# Patient Record
Sex: MALE | Race: WHITE
[De-identification: names, ages, dates, MRNs, and addresses within clinical notes are randomized per-mention and may not be internally consistent; named-entity substitution may affect disease eponyms.]

---

## 2012-09-15 VITALS — DIASTOLIC BLOOD PRESSURE: 67 MMHG | SYSTOLIC BLOOD PRESSURE: 129 MMHG

## 2018-07-29 ENCOUNTER — HOSPITAL ENCOUNTER (EMERGENCY)
Dept: HOSPITAL 97 - ER | Age: 38
Discharge: HOME | End: 2018-07-29
Payer: COMMERCIAL

## 2018-07-29 VITALS — TEMPERATURE: 98.5 F | DIASTOLIC BLOOD PRESSURE: 73 MMHG | SYSTOLIC BLOOD PRESSURE: 122 MMHG

## 2018-07-29 VITALS — OXYGEN SATURATION: 100 %

## 2018-07-29 DIAGNOSIS — Z88.8: ICD-10-CM

## 2018-07-29 DIAGNOSIS — E03.9: ICD-10-CM

## 2018-07-29 DIAGNOSIS — J45.909: Primary | ICD-10-CM

## 2018-07-29 LAB
BUN BLD-MCNC: 12 MG/DL (ref 7–18)
GLUCOSE SERPLBLD-MCNC: 78 MG/DL (ref 74–106)
HCT VFR BLD CALC: 42.9 % (ref 39.6–49)
LYMPHOCYTES # SPEC AUTO: 1.2 K/UL (ref 0.7–4.9)
MCH RBC QN AUTO: 29.3 PG (ref 27–35)
MCV RBC: 87.4 FL (ref 80–100)
PMV BLD: 8.6 FL (ref 7.6–11.3)
POTASSIUM SERPL-SCNC: 3.8 MMOL/L (ref 3.5–5.1)
RBC # BLD: 4.91 M/UL (ref 4.33–5.43)

## 2018-07-29 PROCEDURE — 71045 X-RAY EXAM CHEST 1 VIEW: CPT

## 2018-07-29 PROCEDURE — 36415 COLL VENOUS BLD VENIPUNCTURE: CPT

## 2018-07-29 PROCEDURE — 87040 BLOOD CULTURE FOR BACTERIA: CPT

## 2018-07-29 PROCEDURE — 85025 COMPLETE CBC W/AUTO DIFF WBC: CPT

## 2018-07-29 PROCEDURE — 99285 EMERGENCY DEPT VISIT HI MDM: CPT

## 2018-07-29 PROCEDURE — 80048 BASIC METABOLIC PNL TOTAL CA: CPT

## 2018-07-29 PROCEDURE — 96374 THER/PROPH/DIAG INJ IV PUSH: CPT

## 2018-07-29 PROCEDURE — 81003 URINALYSIS AUTO W/O SCOPE: CPT

## 2018-07-29 NOTE — ER
Nurse's Notes                                                                                     

 [facility]                                                                

Name: Ralph Yañez                                                                             

Age: 37 yrs                                                                                       

Sex: Male                                                                                         

: 1980                                                                                   

MRN: [id number]                                                                                   

Arrival Date: 2018                                                                          

Time: 09:26                                                                                       

Account#: [id number]                                                                            

Bed 7                                                                                             

Private MD:                                                                                       

Diagnosis: Asthma                                                                                 

                                                                                                  

Presentation:                                                                                     

                                                                                             

09:35 Presenting complaint: Patient states: fatigue and constipation since yesterday. Pt      ss  

      reports last BM was yesterday and was, "soupy". Transition of care: patient was not         

      received from another setting of care. Onset of symptoms was 2018. Risk            

      Assessment: Do you want to hurt yourself or someone else? Patient reports no desire to      

      harm self or others. Initial Sepsis Screen: Does the patient meet any 2 criteria? No.       

      Patient's initial sepsis screen is negative. Does the patient have a suspected source       

      of infection? No. Patient's initial sepsis screen is negative. Care prior to arrival:       

      None.                                                                                       

09:35 Method Of Arrival: Ambulatory                                                           ss  

09:35 Acuity: JACEY 3                                                                           ss  

                                                                                                  

Stroke Activation: Symptom onset > 6 hours                                                        

 Physician: Stroke Attending; Name: ; Notified At: ; Arrived At:                                  

 Physician: Chief Stroke Resident; Name: ; Notified At: ; Arrived At:                             

 Physician: Stroke Resident; Name: ; Notified At: ; Arrived At:                                   

 Physician: ED Attending; Name: ; Notified At: ; Arrived At:                                      

 Physician: ED Resident; Name: ; Notified At: ; Arrived At:                                       

                                                                                                  

Historical:                                                                                       

- Allergies:                                                                                      

09:36 Dilantin;                                                                               ss  

- PMHx:                                                                                           

09:36 Asthma; hyperthyroidism; seasonal allergies; Pneumonia;                                 ss  

                                                                                                  

- Immunization history:: Adult Immunizations up to date.                                          

- Social history:: Smoking status: Patient/guardian denies using tobacco.                         

- Ebola Screening: : Patient denies exposure to infectious person Patient denies travel           

  to an Ebola-affected area in the 21 days before illness onset.                                  

                                                                                                  

                                                                                                  

Screenin:50 Abuse screen: Denies threats or abuse. Denies injuries from another. Nutritional        jl7 

      screening: No deficits noted. Tuberculosis screening: No symptoms or risk factors           

      identified. Fall Risk IV access (20 points). Total Martinez Fall Scale indicates No Risk       

      (0-24 pts).                                                                                 

                                                                                                  

Assessment:                                                                                       

09:50 General: Appears in no apparent distress. uncomfortable, Behavior is calm, cooperative, jl7 

      appropriate for age. Pain: Denies pain. Neuro: Level of Consciousness is awake, alert,      

      obeys commands, Oriented to person, place, time. Cardiovascular: Heart tones present        

      Patient's skin is warm and dry. Respiratory: Airway is patent Respiratory effort is         

      even, unlabored, Respiratory pattern is regular, symmetrical, Breath sounds with            

      rhonchi bilaterally. Breath sounds with wheezes bilaterally. GI: No signs and/or            

      symptoms were reported involving the gastrointestinal system. : No signs and/or           

      symptoms were reported regarding the genitourinary system. EENT: No signs and/or            

      symptoms were reported regarding the EENT system. Derm: Skin is pink, warm \T\ dry.         

      Musculoskeletal: No signs and/or symptoms reported regarding the musculoskeletal system.    

10:50 Reassessment: No changes from previously documented assessment. Patient and/or family   jl7 

      updated on plan of care and expected duration. Pain level reassessed. Patient is alert,     

      oriented x 3, equal unlabored respirations, skin warm/dry/pink.                             

11:47 Reassessment: Patient and/or family updated on plan of care and expected duration. Pain jl7 

      level reassessed. Patient is alert, oriented x 3, equal unlabored respirations, skin        

      warm/dry/pink.                                                                              

                                                                                                  

Vital Signs:                                                                                      

09:36  / 89; Pulse 68; Resp 16; Temp 98.7(TE); Pulse Ox 95% on R/A; Weight 83.01 kg;    ss  

      Height 5 ft. 9 in. (175.26 cm); Pain 0/10;                                                  

10:06  / 85; Pulse 64; Resp 16; Temp 99.2(O); Pulse Ox 98% on R/A;                      jl7 

11:00  / 72; Pulse 65; Resp 16 S; Pulse Ox 100% on R/A;                                 jl7 

11:47  / 73; Pulse 62; Resp 16; Temp 98.5(O); Pulse Ox 100% on R/A;                     jl7 

09:36 Body Mass Index 27.02 (83.01 kg, 175.26 cm)                                               

                                                                                                  

ED Course:                                                                                        

09:26 Patient arrived in ED.                                                                  as  

09:36 Triage completed.                                                                       ss  

09:37 Arm band placed on right wrist.                                                         ss  

09:43 Rony Strong MD is Attending Physician.                                              kdr 

09:45 Shelley Dumont RN is Primary Nurse.                                                    ph  

09:50 Patient has correct armband on for positive identification. Placed in gown. Bed in low  jl7 

      position. Call light in reach. Side rails up X 1. Cardiac monitor on. Pulse ox on. NIBP     

      on. Warm blanket given.                                                                     

09:55 Missed attempt(s): 20 gauge in right forearm. Bleeding controlled, band aid applied,    jl7 

      catheter tip intact.                                                                        

10:00 Inserted saline lock: 20 gauge in left forearm, using aseptic technique. Blood          jl7 

      collected.                                                                                  

10:00 Initial lab(s) drawn, by me, sent to lab.                                               jl7 

10:06 Del Wan, RN is Primary Nurse.                                                      jl7 

10:13 CXR XRAY In Process Unspecified.                                                        EDMS

12:34 No provider procedures requiring assistance completed. IV discontinued, intact,         ss  

      bleeding controlled, No redness/swelling at site. Pressure dressing applied.                

                                                                                                  

Administered Medications:                                                                         

10:15 Drug: Xopenex (3) 1.25 mg Route: Inhalation;                                            jl7 

10:45 Follow up: Response: No adverse reaction                                                jl7 

10:15 Drug: SOLU-Medrol 125 mg Route: IVP; Site: left forearm;                                jl7 

10:45 Follow up: Response: No adverse reaction; Marked relief of symptoms                     jl7 

                                                                                                  

                                                                                                  

Outcome:                                                                                          

12:06 Discharge ordered by MD.                                                                kdr 

12:34 Discharged to home ambulatory.                                                          ss  

12:34 Condition: improved                                                                         

12:34 Discharge instructions given to patient, Instructed on discharge instructions, follow       

      up and referral plans. medication usage, Demonstrated understanding of instructions,        

      follow-up care, medications.                                                                

12:35 Patient left the ED.                                                                    ss  

                                                                                                  

Signatures:                                                                                       

Dispatcher [employer]                           EDMS                                                 

Rony Strong MD MD kdr Martinez, Amelia as Smirch, Shelby, TWILA                      RN                                                      

Shelley Dumont RN                      RN                                                      

Del Wan, TWILA                        RN   jl7                                                  

                                                                                                  

**************************************************************************************************

## 2018-07-29 NOTE — EDPHYS
Physician Documentation                                                                           

 [facility]                                                                

Name: Ralph Yañez                                                                             

Age: 37 yrs                                                                                       

Sex: Male                                                                                         

: 1980                                                                                   

MRN: [id number]                                                                                   

Arrival Date: 2018                                                                          

Time: 09:26                                                                                       

Account#: [id number]                                                                            

Bed 7                                                                                             

Private MD:                                                                                       

ED Physician Rony Strong                                                                       

HPI:                                                                                              

                                                                                             

10:29 This 37 yrs old  Male presents to ER via Ambulatory with complaints of         kdr 

      Weakness, Doesn't Feel Right.                                                               

10:29 The patient has not felt well since yesterday and this morning when he awoke, he flet   kdr 

      generally weak. He had loose stool yesterday but today feels constipated. He has not        

      been using his nebulizer on a regular basis - the last time was a few days ago. Denies      

      any other focal c/o in the ED at this time. No apparent acute illness or injury. VS are     

      noted be normal.. Onset: The symptoms/episode began/occurred gradually, yesterday.          

      Severity of symptoms: At their worst the symptoms were mild in the emergency department     

      the symptoms are unchanged. The patient has experienced similar episodes in the past,       

      multiple times. The patient has not recently seen a physician.                              

                                                                                                  

Historical:                                                                                       

- Allergies:                                                                                      

09:36 Dilantin;                                                                               ss  

- PMHx:                                                                                           

09:36 Asthma; hyperthyroidism; seasonal allergies; Pneumonia;                                 ss  

                                                                                                  

- Immunization history:: Adult Immunizations up to date.                                          

- Social history:: Smoking status: Patient/guardian denies using tobacco.                         

- Ebola Screening: : Patient denies exposure to infectious person Patient denies travel           

  to an Ebola-affected area in the 21 days before illness onset.                                  

                                                                                                  

                                                                                                  

ROS:                                                                                              

10:29 Constitutional: Negative for fever, chills, and weight loss, Eyes: Negative for injury, kdr 

      pain, redness, and discharge, ENT: Negative for injury, pain, and discharge, Neck:          

      Negative for injury, pain, and swelling, Cardiovascular: Negative for chest pain,           

      palpitations, and edema, Abdomen/GI: Negative for abdominal pain, nausea, vomiting,         

      diarrhea, and constipation, Back: Negative for injury and pain, : Negative for            

      injury, bleeding, discharge, and swelling, MS/Extremity: Negative for injury and            

      deformity, Skin: Negative for injury, rash, and discoloration, Neuro: Negative for          

      headache, weakness, numbness, tingling, and seizure activity.                               

10:29 Respiratory: Positive for shortness of breath, wheezing, inspiratory, expiratory.           

                                                                                                  

Exam:                                                                                             

10:29 Constitutional:  This is a well developed, well nourished patient who is awake, alert,  kdr 

      and in no acute distress. Head/Face:  Normocephalic, atraumatic. Eyes:  Pupils equal        

      round and reactive to light, extra-ocular motions intact.  Lids and lashes normal.          

      Conjunctiva and sclera are non-icteric and not injected.  Cornea within normal limits.      

      Periorbital areas with no swelling, redness, or edema. Neck:  Trachea midline, no           

      thyromegaly or masses palpated, and no cervical lymphadenopathy.  Supple, full range of     

      motion without nuchal rigidity, or vertebral point tenderness.  No Meningismus.             

      Chest/axilla:  Normal chest wall appearance and motion.  Nontender with no deformity.       

      No lesions are appreciated. Cardiovascular:  Regular rate and rhythm with a normal S1       

      and S2.  No gallops, murmurs, or rubs.  Normal PMI, no JVD.  No pulse deficits.             

      Abdomen/GI:  Soft, non-tender, with normal bowel sounds.  No distension or tympany.  No     

      guarding or rebound.  No evidence of tenderness throughout. Back:  No spinal                

      tenderness.  No costovertebral tenderness.  Full range of motion. Skin:  Warm, dry with     

      normal turgor.  Normal color with no rashes, no lesions, and no evidence of cellulitis.     

      MS/ Extremity:  Pulses equal, no cyanosis.  Neurovascular intact.  Full, normal range       

      of motion. Neuro:  Awake and alert, GCS 15, oriented to person, place, time, and            

      situation.  Cranial nerves II-XII grossly intact.  Motor strength 5/5 in all                

      extremities.  Sensory grossly intact.  Cerebellar exam normal.  Normal gait. Psych:         

      Awake, alert, with orientation to person, place and time.  Behavior, mood, and affect       

      are within normal limits.                                                                   

10:29 Respiratory: the patient does not display signs of respiratory distress,  Respirations:     

      normal, Breath sounds: rales, bronchial sounds, wheezing: that is mild, that is             

      moderate, is heard diffusely, is heard in the  left posterior upper lobe, right             

      posterior upper lobe, left posterior lower lobe, right posterior middle lobe and right      

      posterior lower lobe.                                                                       

                                                                                                  

Vital Signs:                                                                                      

09:36  / 89; Pulse 68; Resp 16; Temp 98.7(TE); Pulse Ox 95% on R/A; Weight 83.01 kg;    ss  

      Height 5 ft. 9 in. (175.26 cm); Pain 0/10;                                                  

10:06  / 85; Pulse 64; Resp 16; Temp 99.2(O); Pulse Ox 98% on R/A;                      jl7 

11:00  / 72; Pulse 65; Resp 16 S; Pulse Ox 100% on R/A;                                 jl7 

11:47  / 73; Pulse 62; Resp 16; Temp 98.5(O); Pulse Ox 100% on R/A;                     jl7 

09:36 Body Mass Index 27.02 (83.01 kg, 175.26 cm)                                             ss  

                                                                                                  

MDM:                                                                                              

12:06 Patient medically screened.                                                             kdr 

13:50 Data reviewed: vital signs, nurses notes.                                               kdr 

                                                                                                  

                                                                                             

09:49 Order name: CBC with Diff; Complete Time: 11:07                                         kdr 

                                                                                             

09:49 Order name: Chem 7; Complete Time: 11:07                                                kdr 

                                                                                             

09:49 Order name: Blood Culture Adult (2)                                                     kdr 

                                                                                             

09:49 Order name: CXR XRAY; Complete Time: 12:05                                              kdr 

                                                                                             

11:06 Order name: Urine Dipstick--Ancillary (enter results)                                   eb  

                                                                                             

11:07 Order name: Urine Dipstick-Ancillary; Complete Time: 11:30                              EDMS

                                                                                             

09:49 Order name: Urine Dipstick-Ancillary (obtain specimen); Complete Time: 12:00            kdr 

                                                                                                  

Administered Medications:                                                                         

10:15 Drug: Xopenex (3) 1.25 mg Route: Inhalation;                                            7 

10:45 Follow up: Response: No adverse reaction                                                jl7 

10:15 Drug: SOLU-Medrol 125 mg Route: IVP; Site: left forearm;                                jl7 

10:45 Follow up: Response: No adverse reaction; Marked relief of symptoms                     jl7 

                                                                                                  

                                                                                                  

Disposition:                                                                                      

18 12:06 Discharged to Home. Impression: Asthma.                                            

- Condition is Stable.                                                                            

- Discharge Instructions: Asthma, Adult, Easy-to-Read, Asthma Attack Prevention, Adult.           

- Prescriptions for Xopenex 1.25 mg/3 mL Inhalation Solution for Nebulization - inhale            

  1 unit by NEBULIZATION route every 8 hours As needed; 1 box. Medrol (Tu) 4 mg Oral             

  Tablets, Dose Pack - take 1 tablet by ORAL route as directed - follow package                   

  instructions; 1 packet. Cipro 500 mg Oral Tablet - take 1 tablet by ORAL route every            

  12 hours for 10 days; 20 tablet.                                                                

- Medication Reconciliation Form, Thank You Letter, Antibiotic Education form.                    

- Follow up: Private Physician; When: 2 - 3 days; Reason: If symptoms return, Further             

  diagnostic work-up, Recheck today's complaints, Continuance of care, Re-evaluation by           

  your physician.                                                                                 

- Problem is new.                                                                                 

- Symptoms have improved.                                                                         

                                                                                                  

                                                                                                  

                                                                                                  

Signatures:                                                                                       

Dispatcher [employer]                           EDMS                                                 

Rony Strong MD MD   kdr                                                  

Yessy Denton RN                      RN   ss                                                   

Del Wan RN                        RN   jl7                                                  

                                                                                                  

Corrections: (The following items were deleted from the chart)                                    

12:35 12:06 2018 12:06 Discharged to Home. Impression: Asthma. Condition is Stable.     ss  

      Forms are Medication Reconciliation Form, Thank You Letter, Antibiotic Education,           

      Prescription Opioid Use. Follow up: Private Physician; When: 2 - 3 days; Reason: If         

      symptoms return, Further diagnostic work-up, Recheck today's complaints, Continuance of     

      care, Re-evaluation by your physician. Problem is new. Symptoms have improved. kdr          

                                                                                                  

**************************************************************************************************

## 2018-07-29 NOTE — RAD REPORT
EXAM DESCRIPTION:  RAD - Chest Single View - 7/29/2018 10:13 am

 

CLINICAL HISTORY:  Cough

 

COMPARISON:  July 9

 

TECHNIQUE:  AP portable chest image was obtained 1010 hours .

 

FINDINGS:  No mass, consolidation or acute failure. Interstitial markings are prominent but improved 
from the comparison. Hilar regions and vasculature is stable. Heart and vasculature are normal. No me
asurable pleural effusion and no pneumothorax. No gross bony abnormality seen. No acute aortic findin
gs suspected.

 

IMPRESSION:  No acute cardiopulmonary process.

 

Lung markings have decreased in prominence since July 9.

## 2018-09-04 ENCOUNTER — HOSPITAL ENCOUNTER (EMERGENCY)
Dept: HOSPITAL 97 - ER | Age: 38
Discharge: HOME | End: 2018-09-04
Payer: COMMERCIAL

## 2018-09-04 VITALS — TEMPERATURE: 99 F

## 2018-09-04 VITALS — OXYGEN SATURATION: 100 % | DIASTOLIC BLOOD PRESSURE: 57 MMHG | SYSTOLIC BLOOD PRESSURE: 116 MMHG

## 2018-09-04 DIAGNOSIS — J45.909: ICD-10-CM

## 2018-09-04 DIAGNOSIS — Z88.8: ICD-10-CM

## 2018-09-04 DIAGNOSIS — J32.9: Primary | ICD-10-CM

## 2018-09-04 DIAGNOSIS — J30.2: ICD-10-CM

## 2018-09-04 LAB
BUN BLD-MCNC: 20 MG/DL (ref 7–18)
GLUCOSE SERPLBLD-MCNC: 94 MG/DL (ref 74–106)
HCT VFR BLD CALC: 45 % (ref 39.6–49)
LYMPHOCYTES # SPEC AUTO: 1.7 K/UL (ref 0.7–4.9)
MCH RBC QN AUTO: 29.5 PG (ref 27–35)
MCV RBC: 86.1 FL (ref 80–100)
PMV BLD: 8.3 FL (ref 7.6–11.3)
POTASSIUM SERPL-SCNC: 3.8 MMOL/L (ref 3.5–5.1)
RBC # BLD: 5.23 M/UL (ref 4.33–5.43)

## 2018-09-04 PROCEDURE — 99284 EMERGENCY DEPT VISIT MOD MDM: CPT

## 2018-09-04 PROCEDURE — 96372 THER/PROPH/DIAG INJ SC/IM: CPT

## 2018-09-04 PROCEDURE — 85025 COMPLETE CBC W/AUTO DIFF WBC: CPT

## 2018-09-04 PROCEDURE — 94640 AIRWAY INHALATION TREATMENT: CPT

## 2018-09-04 PROCEDURE — 87081 CULTURE SCREEN ONLY: CPT

## 2018-09-04 PROCEDURE — 36415 COLL VENOUS BLD VENIPUNCTURE: CPT

## 2018-09-04 PROCEDURE — 71045 X-RAY EXAM CHEST 1 VIEW: CPT

## 2018-09-04 PROCEDURE — 80048 BASIC METABOLIC PNL TOTAL CA: CPT

## 2018-09-04 PROCEDURE — 87070 CULTURE OTHR SPECIMN AEROBIC: CPT

## 2018-09-04 NOTE — RAD REPORT
EXAM DESCRIPTION:  RAD - Chest Single View - 9/4/2018 3:43 am

 

CLINICAL HISTORY:  COUGH

Chest pain.

 

COMPARISON:  Chest Single View dated 7/29/2018; Chest Pa And Lat (2 Views) dated 7/9/2018; Chest Pa A
nd Lat (2 Views) dated 4/27/2018; Chest Pa And Lat (2 Views) dated 1/8/2018

 

FINDINGS:  Portable technique limits examination quality.

 

The lungs are grossly clear. The heart is normal in size. No displaced fractures.

 

IMPRESSION:  No acute intrathoracic process suspected.

## 2018-09-04 NOTE — ER
Nurse's Notes                                                                                     

 [facility]                                                                

Name: Ralph Yañez                                                                             

Age: 37 yrs                                                                                       

Sex: Male                                                                                         

: 1980                                                                                   

MRN: [id number]                                                                                   

Arrival Date: 2018                                                                          

Time: 03:03                                                                                       

Account#: [id number]                                                                            

Bed 15                                                                                            

Private MD:                                                                                       

Diagnosis: Sinusitis                                                                              

                                                                                                  

Presentation:                                                                                     

                                                                                             

03:14 Presenting complaint: Patient states: he has been "clogged up" since Friday indicating  bb  

      his face and head, and it seems to be working its way down also has irritated throat        

      and non-productive cough. Transition of care: patient was not received from another         

      setting of care. Onset of symptoms was 2018. Risk Assessment: Do you want to     

      hurt yourself or someone else? Patient reports no desire to harm self or others.            

      Initial Sepsis Screen: Does the patient meet any 2 criteria? No. Patient's initial          

      sepsis screen is negative. Does the patient have a suspected source of infection? No.       

      Patient's initial sepsis screen is negative. Care prior to arrival: None.                   

03:14 Method Of Arrival: Ambulatory                                                           bb  

03:14 Acuity: JACEY 4                                                                           bb  

                                                                                                  

Historical:                                                                                       

- Allergies:                                                                                      

03:16 Dilantin;                                                                               bb  

- Home Meds:                                                                                      

03:16 levothyroxine oral [Active];                                                            bb  

03:29 albuterol sulfate 0.63 mg/3 mL Inhl nebu [Active]; Flonase 50 mcg/actuation Nasal spsn  ao  

      1 spray 2 times per day [Active]; Zyrtec 10 mg Oral chew 1 tab once daily [Active];         

- PMHx:                                                                                           

03:16 Asthma; hyperthyroidism; Pneumonia; seasonal allergies;                                 bb  

- PSHx:                                                                                           

03:16 Thyroidectomy;                                                                          bb  

                                                                                                  

- Immunization history:: Adult Immunizations up to date.                                          

- Social history:: Smoking status: Patient/guardian denies using tobacco,                         

  Patient/guardian denies using alcohol, street drugs.                                            

- Ebola Screening: : No symptoms or risks identified at this time.                                

                                                                                                  

                                                                                                  

Screenin:26 Abuse screen: Denies threats or abuse. Denies injuries from another. Nutritional        ao  

      screening: No deficits noted. Tuberculosis screening: No symptoms or risk factors           

      identified. Fall Risk None identified.                                                      

                                                                                                  

Assessment:                                                                                       

03:20 General: Appears in no apparent distress. comfortable, Behavior is cooperative,         ao  

      inappropriate for age. Pain: Complains of pain in irritated chest. Neuro: Level of          

      Consciousness is awake, alert, obeys commands, Oriented to person, place, time,             

      situation, Appropriate for age Moves all extremities. Full function Speech is normal,       

      Facial symmetry appears normal. Cardiovascular: Capillary refill < 3 seconds Patient's      

      skin is warm and dry. Respiratory: Airway is patent Respiratory effort is even,             

      unlabored, Respiratory pattern is regular, symmetrical, Breath sounds with wheezes          

      bilaterally. GI: Abdomen is non-distended. : No signs and/or symptoms were reported       

      regarding the genitourinary system. EENT: No signs and/or symptoms were reported            

      regarding the EENT system. Derm: Skin is intact, Skin temperature is warm.                  

      Musculoskeletal: Circulation, motion, and sensation intact. Range of motion: intact in      

      all extremities.                                                                            

04:13 Reassessment: Patient appears in no apparent distress at this time. Patient and/or      ao  

      family updated on plan of care and expected duration. Pain level reassessed. Patient is     

      alert, oriented x 3, equal unlabored respirations, skin warm/dry/pink.                      

                                                                                                  

Vital Signs:                                                                                      

03:16  / 91; Pulse 77; Resp 16 S; Temp 99(O); Pulse Ox 98% on R/A; Weight 80.29 kg (R); bb  

      Height 5 ft. 9 in. (175.26 cm) (R); Pain 0/10;                                              

04:13  / 57; Pulse 79; Resp 16; Pulse Ox 100% on R/A;                                   ao  

03:16 Body Mass Index 26.14 (80.29 kg, 175.26 cm)                                             bb  

                                                                                                  

ED Course:                                                                                        

03:03 Patient arrived in ED.                                                                  es  

03:06 Garrett Coffey RN is Primary Nurse.                                                       ao  

03:10 David Sánchez MD is Attending Physician.                                                    pkl 

03:15 Triage completed.                                                                       bb  

03:16 Arm band placed on Patient placed in an exam room, on a stretcher, on pulse oximetry.   bb  

03:27 Patient has correct armband on for positive identification. Pulse ox on. NIBP on.       ao  

03:43 X-ray completed. Portable x-ray completed in exam room. Patient tolerated procedure     kw  

      well.                                                                                       

03:44 XRAY CXR (1 view) In Process Unspecified.                                               EDMS

05:36 No provider procedures requiring assistance completed. Patient did not have IV access   ao  

      during this emergency room visit.                                                           

                                                                                                  

Administered Medications:                                                                         

03:45 Drug: SOLU-Medrol 125 mg Route: IM; Site: left deltoid;                                 ao  

05:00 Follow up: Response: No adverse reaction                                                ao  

03:46 Drug: Albuterol - atroVENT (3:1) (2.5 mg - 0.5 mg) 3 ml Route: Nebulizer;               ao  

05:00 Follow up: Response: No adverse reaction                                                ao  

                                                                                                  

                                                                                                  

Outcome:                                                                                          

05:09 Discharge ordered by MD.                                                                [place] 

05:36 Discharged to home ambulatory.                                                          ao  

05:36 Condition: stable                                                                           

05:36 Discharge instructions given to patient, Instructed on discharge instructions, follow       

      up and referral plans. Demonstrated understanding of instructions, follow-up care,          

      medications, Prescriptions given X 2.                                                       

05:37 Patient left the ED.                                                                    ao  

                                                                                                  

Signatures:                                                                                       

Dispatcher [employer]                           David Gupta MD MD pkl Salyer, Edna es Ballard, Brenda, RN                     RN   Mallory Jackson Alex, RN                         RN   ao                                                   

                                                                                                  

**************************************************************************************************

## 2018-09-04 NOTE — EDPHYS
Physician Documentation                                                                           

 [facility]                                                                

Name: Ralph Yañez                                                                             

Age: 37 yrs                                                                                       

Sex: Male                                                                                         

: 1980                                                                                   

MRN: [id number]                                                                                   

Arrival Date: 2018                                                                          

Time: 03:03                                                                                       

Account#: [id number]                                                                            

Bed 15                                                                                            

Private MD:                                                                                       

ED Physician David Sánchez                                                                             

HPI:                                                                                              

                                                                                             

03:28 This 37 yrs old  Male presents to ER via Ambulatory with complaints of         pkl 

      Congestion.                                                                                 

03:28 The patient or guardian reports cough, with no sputum, sinus congestion. Onset: The     pkl 

      symptoms/episode began/occurred 3 day(s) ago.                                               

                                                                                                  

Historical:                                                                                       

- Allergies:                                                                                      

03:16 Dilantin;                                                                               bb  

- Home Meds:                                                                                      

03:16 levothyroxine oral [Active];                                                            bb  

03:29 albuterol sulfate 0.63 mg/3 mL Inhl nebu [Active]; Flonase 50 mcg/actuation Nasal spsn  ao  

      1 spray 2 times per day [Active]; Zyrtec 10 mg Oral chew 1 tab once daily [Active];         

- PMHx:                                                                                           

03:16 Asthma; hyperthyroidism; Pneumonia; seasonal allergies;                                 bb  

- PSHx:                                                                                           

03:16 Thyroidectomy;                                                                          bb  

                                                                                                  

- Immunization history:: Adult Immunizations up to date.                                          

- Social history:: Smoking status: Patient/guardian denies using tobacco,                         

  Patient/guardian denies using alcohol, street drugs.                                            

- Ebola Screening: : No symptoms or risks identified at this time.                                

                                                                                                  

                                                                                                  

ROS:                                                                                              

03:28 Eyes: Negative for injury, pain, redness, and discharge, ENT: Negative for injury,      pkl 

      pain, and discharge, Neck: Negative for injury, pain, and swelling, Cardiovascular:         

      Negative for chest pain, palpitations, and edema.                                           

03:28 Respiratory: Positive for cough, with no reported sputum.                                   

03:28 Abdomen/GI: Negative for abdominal pain, nausea, vomiting, and diarrhea.                    

03:28 Back: Negative for acute changes.                                                           

03:28 : Negative for urinary symptoms.                                                          

03:28 MS/extremity: Negative for acute changes.                                                   

03:28 Skin: Negative for rash.                                                                    

03:28 Neuro: Negative for altered mental status.                                                  

                                                                                                  

Exam:                                                                                             

03:28 Head/Face:  Normocephalic, atraumatic. Eyes:  Pupils equal round and reactive to light, pkl 

      extra-ocular motions intact.  Lids and lashes normal.  Conjunctiva and sclera are           

      non-icteric and not injected.  Cornea within normal limits.  Periorbital areas with no      

      swelling, redness, or edema. ENT:  Nares patent. No nasal discharge, no septal              

      abnormalities noted.  Tympanic membranes are normal and external auditory canals are        

      clear.  Oropharynx with no redness, swelling, or masses, exudates, or evidence of           

      obstruction, uvula midline.  Mucous membranes moist. Neck:  Trachea midline, no             

      thyromegaly or masses palpated, and no cervical lymphadenopathy.  Supple, full range of     

      motion without nuchal rigidity, or vertebral point tenderness.  No Meningismus.             

      Chest/axilla:  Normal chest wall appearance and motion.  Nontender with no deformity.       

      No lesions are appreciated. Cardiovascular:  Regular rate and rhythm with a normal S1       

      and S2.  No gallops, murmurs, or rubs.  Normal PMI, no JVD.  No pulse deficits.             

      Respiratory:  Lungs have equal breath sounds bilaterally, clear to auscultation and         

      percussion.  No rales, rhonchi or wheezes noted.  No increased work of breathing, no        

      retractions or nasal flaring. Abdomen/GI:  Soft, non-tender, with normal bowel sounds.      

      No distension or tympany.  No guarding or rebound.  No evidence of tenderness               

      throughout. Back:  No spinal tenderness.  No costovertebral tenderness.  Full range of      

      motion. Skin:  Warm, dry with normal turgor.  Normal color with no rashes, no lesions,      

      and no evidence of cellulitis. MS/ Extremity:  Pulses equal, no cyanosis.                   

      Neurovascular intact.  Full, normal range of motion. Neuro:  Awake and alert, GCS 15,       

      oriented to person, place, time, and situation.  Cranial nerves II-XII grossly intact.      

      Motor strength 5/5 in all extremities.  Sensory grossly intact.  Cerebellar exam            

      normal.  Normal gait.                                                                       

                                                                                                  

Vital Signs:                                                                                      

03:16  / 91; Pulse 77; Resp 16 S; Temp 99(O); Pulse Ox 98% on R/A; Weight 80.29 kg (R); bb  

      Height 5 ft. 9 in. (175.26 cm) (R); Pain 0/10;                                              

04:13  / 57; Pulse 79; Resp 16; Pulse Ox 100% on R/A;                                   ao  

03:16 Body Mass Index 26.14 (80.29 kg, 175.26 cm)                                             bb  

                                                                                                  

MDM:                                                                                              

03:10 Patient medically screened.                                                             pkl 

05:07 Data reviewed: vital signs, nurses notes, lab test result(s), radiologic studies, plain pkl 

      films.                                                                                      

                                                                                                  

                                                                                             

03:26 Order name: CBC with Diff; Complete Time: 03:57                                         pkl 

                                                                                             

03:26 Order name: Chem 7; Complete Time: 05:04                                                pkl 

                                                                                             

03:26 Order name: Strep; Complete Time: 05:04                                                 pkl 

                                                                                             

03:26 Order name: XRAY CXR (1 view)                                                           pkl 

                                                                                             

04:03 Order name: Throat Culture                                                              EDMS

                                                                                                  

Administered Medications:                                                                         

03:45 Drug: SOLU-Medrol 125 mg Route: IM; Site: left deltoid;                                 ao  

05:00 Follow up: Response: No adverse reaction                                                ao  

03:46 Drug: Albuterol - atroVENT (3:1) (2.5 mg - 0.5 mg) 3 ml Route: Nebulizer;               ao  

05:00 Follow up: Response: No adverse reaction                                                ao  

                                                                                                  

                                                                                                  

Disposition:                                                                                      

18 05:09 Discharged to Home. Impression: Sinusitis.                                         

- Condition is Stable.                                                                            

                                                                                                  

- Prescriptions for Augmentin 875- 125 mg Oral Tablet - take 1 tablet by ORAL route               

  every 12 hours for 10 days; 20 tablet.                                                          

- Medication Reconciliation Form, Thank You Letter, Antibiotic Education, Prescription            

  Opioid Use form.                                                                                

- Follow up: Private Physician; When: 2 - 3 days; Reason: Re-evaluation by your                   

  physician.                                                                                      

- Problem is new.                                                                                 

- Symptoms have improved.                                                                         

                                                                                                  

                                                                                                  

                                                                                                  

Signatures:                                                                                       

Dispatcher [employer]                           EDMS                                                 

David Sánchez MD MD pkl Ballard, Brenda, RN                     RN   Garrett Rodriguez RN                         RN   ao                                                   

                                                                                                  

Corrections: (The following items were deleted from the chart)                                    

05:37 05:09 2018 05:09 Discharged to Home. Impression: Sinusitis. Condition is Stable.  ao  

      Forms are Medication Reconciliation Form, Thank You Letter, Antibiotic Education,           

      Prescription Opioid Use. Follow up: Private Physician; When: 2 - 3 days; Reason:            

      Re-evaluation by your physician. Problem is new. Symptoms have improved. pkl                

                                                                                                  

**************************************************************************************************

## 2018-11-29 ENCOUNTER — HOSPITAL ENCOUNTER (EMERGENCY)
Dept: HOSPITAL 97 - ER | Age: 38
Discharge: HOME | End: 2018-11-29
Payer: COMMERCIAL

## 2018-11-29 VITALS — SYSTOLIC BLOOD PRESSURE: 136 MMHG | OXYGEN SATURATION: 98 % | DIASTOLIC BLOOD PRESSURE: 77 MMHG

## 2018-11-29 VITALS — TEMPERATURE: 98 F

## 2018-11-29 DIAGNOSIS — J45.901: Primary | ICD-10-CM

## 2018-11-29 DIAGNOSIS — J30.2: ICD-10-CM

## 2018-11-29 DIAGNOSIS — Z88.8: ICD-10-CM

## 2018-11-29 PROCEDURE — 99284 EMERGENCY DEPT VISIT MOD MDM: CPT

## 2018-11-29 PROCEDURE — 71046 X-RAY EXAM CHEST 2 VIEWS: CPT

## 2018-11-29 PROCEDURE — 87081 CULTURE SCREEN ONLY: CPT

## 2018-11-29 PROCEDURE — 94640 AIRWAY INHALATION TREATMENT: CPT

## 2018-11-29 PROCEDURE — 87070 CULTURE OTHR SPECIMN AEROBIC: CPT

## 2018-11-29 PROCEDURE — 87804 INFLUENZA ASSAY W/OPTIC: CPT

## 2018-11-29 NOTE — ER
Nurse's Notes                                                                                     

 [facility]                                                                

Name: Ralph Yañez                                                                             

Age: 38 yrs                                                                                       

Sex: Male                                                                                         

: 1980                                                                                   

MRN: [id number]                                                                                   

Arrival Date: 2018                                                                          

Time: 06:10                                                                                       

Account#: [id number]                                                                            

Bed 6                                                                                             

Private MD:                                                                                       

Diagnosis: Unspecified asthma with (acute) exacerbation                                           

                                                                                                  

Presentation:                                                                                     

                                                                                             

06:28 Presenting complaint: Patient states: "For about a week now I have been having this     jd3 

      pressure at the bottom of my rib cage. and since lunch yesterday I have been feeling        

      just really tired even after rest.". Transition of care: patient was not received from      

      another setting of care. Onset of symptoms was 2018. Risk Assessment: Do       

      you want to hurt yourself or someone else? Patient reports no desire to harm self or        

      others. Initial Sepsis Screen: Does the patient meet any 2 criteria? No. Patient's          

      initial sepsis screen is negative. Does the patient have a suspected source of              

      infection? No. Patient's initial sepsis screen is negative. Care prior to arrival: None.    

06:28 Method Of Arrival: Ambulatory                                                           jd3 

06:28 Acuity: JACEY 4                                                                           jd3 

                                                                                                  

Historical:                                                                                       

- Allergies:                                                                                      

06:31 Dilantin;                                                                               jd3 

- Home Meds:                                                                                      

06:31 levothyroxine oral [Active]; albuterol sulfate 0.63 mg/3 mL Inhl nebu [Active]; Flonase jd3 

      50 mcg/actuation Nasal spsn 1 spray 2 times per day [Active]; Zyrtec 10 mg Oral chew 1      

      tab once daily [Active];                                                                    

- PMHx:                                                                                           

06:31 Asthma; Pneumonia; seasonal allergies; hyperthyroidism;                                 jd3 

- PSHx:                                                                                           

06:31 Thyroidectomy;                                                                          jd3 

                                                                                                  

- Immunization history:: Adult Immunizations unknown.                                             

- Social history:: Smoking status: Patient/guardian denies using tobacco.                         

- Ebola Screening: : Patient negative for fever greater than or equal to 101.5 degrees            

  Fahrenheit, and additional compatible Ebola Virus Disease symptoms.                             

                                                                                                  

                                                                                                  

Screenin:35 Abuse screen: Denies threats or abuse. Nutritional screening: No deficits noted.        jd3 

      Tuberculosis screening: No symptoms or risk factors identified. Fall Risk Ambulatory        

      Aid- None/Bed Rest/Nurse Assist (0 pts). Gait- Normal/Bed Rest/Wheelchair (0 pts)           

      Mental Status- Oriented to own ability (0 pts). Total Martinez Fall Scale indicates No         

      Risk (0-24 pts).                                                                            

                                                                                                  

Assessment:                                                                                       

06:32 General: Appears in no apparent distress. uncomfortable, Behavior is calm, cooperative, jd3 

      appropriate for age, Reports fatigue for. Pain: Complains of pain in diaphragm Quality      

      of pain is described as pressure. Neuro: Level of Consciousness is awake, alert, obeys      

      commands, Oriented to person, place, time, situation. Cardiovascular: Denies chest          

      pain, Capillary refill < 3 seconds Patient's skin is warm and dry. Respiratory: Airway      

      is patent Respiratory effort is even, unlabored, Respiratory pattern is regular,            

      symmetrical, Denies shortness of breath. GI: No signs and/or symptoms were reported         

      involving the gastrointestinal system. : No signs and/or symptoms were reported           

      regarding the genitourinary system. EENT: No signs and/or symptoms were reported            

      regarding the EENT system. Derm: Skin is intact, Skin is dry, Skin is normal, Skin          

      temperature is warm. Musculoskeletal: Circulation, motion, and sensation intact. Range      

      of motion: intact in all extremities.                                                       

07:00 Reassessment: RECD REPORT FROM IAIN MATTSON. .37YO WM P/W FATIGUE, H/O RAD. NEB IN       bp  

      PROCESS, SWABS PENDING. VS STABLE AT THIS TIME.                                             

07:40 Reassessment: Pt to x-ray .                                                             aa5 

07:58 Reassessment: PT RETURNED FROM XRAY. DISPO PENDING.                                     bp  

08:10 Reassessment: PT D/C HOME AMBULATORY, DX WITH ASTHMA EXACERBATION.                      bp  

                                                                                                  

Vital Signs:                                                                                      

06:31  / 99; Pulse 73; Resp 18 S; Temp 98.0(O); Pulse Ox 98% on R/A; Weight 79.83 kg    jd3 

      (R); Height 5 ft. 7 in. (170.18 cm) (R); Pain 2/10;                                         

07:00  / 70; Pulse 69; Resp 16; Pulse Ox 100% ;                                         bp  

07:58  / 77; Pulse 84; Resp 16; Pulse Ox 98% ;                                          bp  

06:31 Body Mass Index 27.57 (79.83 kg, 170.18 cm)                                             jd3 

                                                                                                  

ED Course:                                                                                        

06:10 Patient arrived in ED.                                                                  ds1 

06:28 Skip Jain RN is Primary Nurse.                                                  jd3 

06:29 Triage completed.                                                                       jd3 

06:32 Arm band placed on.                                                                     jd3 

06:35 Patient has correct armband on for positive identification. Bed in low position. Call   j 

      light in reach. Side rails up X 1.                                                          

06:38 Shaw Núñez PA is PHCP.                                                                cp  

06:39 Rishi Small MD is Attending Physician.                                             cp  

06:55 Strep Sent.                                                                             jd3 

06:55 Influenza Screen (a \T\ B) Sent.                                                          jd3

07:03 Primary Nurse role handed off by Skip Jain RN                                   bp  

07:03 Case Agustin, RN is Primary Nurse.                                                    bp  

07:49 X-ray completed. Patient tolerated procedure well. Patient moved back from radiology.   jb2 

07:50 XRAY Chest Pa And Lat (2 Views) In Process Unspecified.                                 EDMS

07:58 Rony Strong MD is Attending Physician.                                              cp  

08:11 No provider procedures requiring assistance completed. Patient did not have IV access   bp  

      during this emergency room visit.                                                           

                                                                                                  

Administered Medications:                                                                         

06:54 Drug: Albuterol - atroVENT (3:1) (2.5 mg - 0.5 mg) 3 ml Route: Nebulizer;               jd3 

08:14 Follow up: Response: No adverse reaction                                                bp  

06:55 Drug: predniSONE 60 mg Route: PO;                                                       jd3 

08:14 Follow up: Response: No adverse reaction                                                bp  

                                                                                                  

                                                                                                  

Outcome:                                                                                          

08:04 Discharge ordered by MD.                                                                cp  

08:11 Discharged to home ambulatory.                                                          bp  

08:11 Condition: stable                                                                           

08:11 Discharge instructions given to patient, Instructed on discharge instructions, follow       

      up and referral plans. medication usage, Demonstrated understanding of instructions,        

      follow-up care, medications, Prescriptions given X 3.                                       

08:13 Patient left the ED.                                                                    bp  

                                                                                                  

Signatures:                                                                                       

Dispatcher [employer]                           EDMS                                                 

Elbert Connors                              jb2                                                  

BegumGabby ruggiero                                ds1                                                  

Brionna Lopez, RN                     RN   aa5                                                  

Shaw Núñez PA                         PA   cp                                                   

Skip Jain, TWILA                    RN   Csae Nettles RN                      RN   bp                                                   

                                                                                                  

**************************************************************************************************

## 2018-11-29 NOTE — RAD REPORT
EXAM DESCRIPTION:  RAD - Chest Pa And Lat (2 Views) - 11/29/2018 7:51 am

 

CLINICAL HISTORY:  Chest pain and pressure

 

COMPARISON:  September 4

 

TECHNIQUE:  PA and lateral views of the chest were obtained.

 

FINDINGS:  The lungs are clear.  Lung markings are mildly prominent, similar to comparison. Heart siz
e is normal and central vasculature is within normal limits.  No pleural effusion or pneumothorax see
n.  No acute bony finding noted.  No aortic abnormality.

 

IMPRESSION:  No acute cardiopulmonary process.  No significant change from comparison.

## 2018-11-29 NOTE — EDPHYS
Physician Documentation                                                                           

 [facility]                                                                

Name: Ralph Yañez                                                                             

Age: 38 yrs                                                                                       

Sex: Male                                                                                         

: 1980                                                                                   

MRN: [id number]                                                                                   

Arrival Date: 2018                                                                          

Time: 06:10                                                                                       

Account#: [id number]                                                                            

Bed 6                                                                                             

Private MD:                                                                                       

ED Physician Rony Strong                                                                       

HPI:                                                                                              

                                                                                             

06:50 This 38 yrs old  Male presents to ER via Ambulatory with complaints of Doesn't cp  

      Feel Right.                                                                                 

06:50 The patient or guardian reports cough, that is intermittent, lower chest pressure,      cp  

      chest tightness.                                                                            

06:50 Associated signs and symptoms: Pertinent positives: sore throat, post nasal drainage,   cp  

      Pertinent negatives: diarrhea, ear ache, fever, vomiting. Severity of symptoms: in the      

      emergency department the symptoms are unchanged despite home interventions.                 

06:50 Onset: The symptoms/episode began/occurred 1 week(s) ago.                               cp  

                                                                                                  

Historical:                                                                                       

- Allergies:                                                                                      

06:31 Dilantin;                                                                               jd3 

- Home Meds:                                                                                      

06:31 levothyroxine oral [Active]; albuterol sulfate 0.63 mg/3 mL Inhl nebu [Active]; Flonase jd3 

      50 mcg/actuation Nasal spsn 1 spray 2 times per day [Active]; Zyrtec 10 mg Oral chew 1      

      tab once daily [Active];                                                                    

- PMHx:                                                                                           

06:31 Asthma; Pneumonia; seasonal allergies; hyperthyroidism;                                 jd3 

- PSHx:                                                                                           

06:31 Thyroidectomy;                                                                          jd3 

                                                                                                  

- Immunization history:: Adult Immunizations unknown.                                             

- Social history:: Smoking status: Patient/guardian denies using tobacco.                         

- Ebola Screening: : Patient negative for fever greater than or equal to 101.5 degrees            

  Fahrenheit, and additional compatible Ebola Virus Disease symptoms.                             

                                                                                                  

                                                                                                  

ROS:                                                                                              

06:55 Constitutional: Negative for body aches, chills, fever, poor PO intake.                 cp  

06:55 Eyes: Negative for injury, pain, redness, and discharge.                                cp  

06:55 ENT: Positive for rhinorrhea, sore throat, Negative for drainage from ear(s), ear pain,     

      difficulty swallowing, difficulty handling secretions.                                      

06:55 Cardiovascular: Positive for lower chest pressure, chest tightness, Negative for edema,     

      palpitations.                                                                               

06:55 Respiratory: Positive for cough, wheezing, Negative for hemoptysis.                         

06:55 Abdomen/GI: Negative for abdominal pain, nausea, vomiting, and diarrhea, black/tarry        

      stool, rectal bleeding.                                                                     

06:55 Skin: Negative for cellulitis, rash.                                                        

06:55 Neuro: Negative for altered mental status, headache, syncope, weakness.                     

06:55 All other systems are negative.                                                             

                                                                                                  

Exam:                                                                                             

07:05 Head/Face:  Normocephalic, atraumatic.                                                  cp  

07:05 Constitutional: The patient appears in no acute distress, alert, awake,                     

      non-diaphoretic, non-toxic, well developed, well nourished.                                 

07:05 Eyes: Periorbital structures: appear normal, Conjunctiva: normal, no exudate, no            

      injection, Sclera: no appreciated abnormality, Lids and lashes: appear normal,              

      bilaterally.                                                                                

07:05 ENT: External ear(s): are unremarkable, Ear canal(s): are normal, clear, TM's:              

      dullness, bilaterally, Nose: is normal, Mouth: Lips: moist, Oral mucosa: moist,             

      Posterior pharynx: Airway: no evidence of obstruction, patent, Tonsils: no enlargement,     

      no exudate, Uvula: midline, swelling, is not appreciated, erythema, that is mild,           

      exudate, is not appreciated.                                                                

07:05 Neck: ROM/movement: is normal, is supple, without pain, no range of motions                 

      limitations, no meningismus, no nuchal rigidity, Lymph nodes: no appreciated                

      lymphadenopathy.                                                                            

07:05 Chest/axilla: Inspection: normal, Palpation: is normal, no crepitus, no tenderness.         

07:05 Cardiovascular: Rate: normal, Rhythm: regular, Heart sounds: murmur, not appreciated,       

      Edema: is not appreciated.                                                                  

07:05 Respiratory: the patient does not display signs of respiratory distress,  Respirations:     

      normal, no use of accessory muscles, no retractions, no splinting, no tachypnea,            

      labored breathing, is not present, Breath sounds: decreased breath sounds, that are         

      mild, throughout, stridor, is not appreciated, wheezing: that is mild, is heard             

      diffusely.                                                                                  

07:05 Abdomen/GI: Inspection: abdomen appears normal, Palpation: abdomen is soft and              

      non-tender, in all quadrants.                                                               

07:05 Back: pain, is absent, ROM is normal.                                                       

07:05 Skin: cellulitis, is not appreciated, no rash present.                                      

07:05 Neuro: Orientation: to person, place \T\ time. Mentation: is normal, Cerebellar function: cp

      is grossly normal, Motor: moves all fours, strength is normal, Sensation: is normal.        

                                                                                                  

Vital Signs:                                                                                      

06:31  / 99; Pulse 73; Resp 18 S; Temp 98.0(O); Pulse Ox 98% on R/A; Weight 79.83 kg    jd3 

      (R); Height 5 ft. 7 in. (170.18 cm) (R); Pain 2/10;                                         

07:00  / 70; Pulse 69; Resp 16; Pulse Ox 100% ;                                         bp  

07:58  / 77; Pulse 84; Resp 16; Pulse Ox 98% ;                                          bp  

06:31 Body Mass Index 27.57 (79.83 kg, 170.18 cm)                                             jd3 

                                                                                                  

MDM:                                                                                              

06:43 Patient medically screened.                                                               

08:02 Data reviewed: vital signs, nurses notes, lab test result(s), radiologic studies, plain cp  

      films. ED course: VSS. Symptoms markedly improved with wheezing resolved after              

      breathing treatment. Will discharge to home for continued monitoring.                       

                                                                                                  

                                                                                             

06:44 Order name: Influenza Screen (a \T\ B); Complete Time: 07:58                              

                                                                                             

07:58 Interpretation: Reviewed.                                                                 

                                                                                             

06:44 Order name: Strep; Complete Time: 07:58                                                   

                                                                                             

07:58 Interpretation: Reviewed.                                                                 

                                                                                             

07:13 Order name: XRAY Chest Pa And Lat (2 Views)                                               

                                                                                             

07:16 Order name: Throat Culture                                                              EDMS

                                                                                                  

Administered Medications:                                                                         

06:54 Drug: Albuterol - atroVENT (3:1) (2.5 mg - 0.5 mg) 3 ml Route: Nebulizer;               jd3 

08:14 Follow up: Response: No adverse reaction                                                bp  

06:55 Drug: predniSONE 60 mg Route: PO;                                                       jd3 

08:14 Follow up: Response: No adverse reaction                                                bp  

                                                                                                  

                                                                                                  

Disposition:                                                                                      

10:10 Co-signature as Attending Physician, Rony Strong MD I agree with the assessment and   kdr 

      plan of care.                                                                               

                                                                                                  

Disposition:                                                                                      

18 08:04 Discharged to Home. Impression: Unspecified asthma with (acute) exacerbation.      

- Condition is Stable.                                                                            

- Discharge Instructions: Asthma, Adult.                                                          

- Prescriptions for Albuterol Sulfate 2.5 mg /3 mL (0.083 %) Inhalation Solution for              

  Nebulization - inhale 1 unit by NEBULIZATION route every 8 hours As needed; 1 box.              

  Prednisone 20 mg Oral Tablet - take 2 tablet by ORAL route once daily for 5 days; 10            

  tablet. Albuterol Sulfate 90 mcg/actuation - inhale 1-2 puff by INHALATION route                

  every 4-6 hours; 1 Inhaler.                                                                     

- Medication Reconciliation Form, Thank You Letter, Antibiotic Education, Prescription            

  Opioid Use form.                                                                                

- Follow up: Private Physician; When: 1 - 2 days; Reason: Recheck today's complaints.             

- Problem is an acute exacerbation.                                                               

- Symptoms have improved.                                                                         

                                                                                                  

                                                                                                  

                                                                                                  

Signatures:                                                                                       

Dispatcher [employer]                           EDMS                                                 

Rony Strong MD MD   kdr                                                  

Shaw Núñez PA PA cp Davies, Jonathon RN                    RN   jd3                                                  

Case Agustin RN                      RN   bp                                                   

                                                                                                  

Corrections: (The following items were deleted from the chart)                                    

08:13 08:04 2018 08:04 Discharged to Home. Impression: Unspecified asthma with (acute)  bp  

      exacerbation. Condition is Stable. Forms are Medication Reconciliation Form, Thank You      

      Letter, Antibiotic Education, Prescription Opioid Use. Follow up: Private Physician;        

      When: 1 - 2 days; Reason: Recheck today's complaints. Problem is an acute exacerbation.     

      Symptoms have improved. cp                                                                  

                                                                                                  

**************************************************************************************************

## 2019-11-10 ENCOUNTER — HOSPITAL ENCOUNTER (EMERGENCY)
Dept: HOSPITAL 97 - ER | Age: 39
Discharge: HOME | End: 2019-11-10
Payer: COMMERCIAL

## 2019-11-10 VITALS — TEMPERATURE: 98.6 F | OXYGEN SATURATION: 99 % | SYSTOLIC BLOOD PRESSURE: 130 MMHG | DIASTOLIC BLOOD PRESSURE: 81 MMHG

## 2019-11-10 DIAGNOSIS — J20.9: Primary | ICD-10-CM

## 2019-11-10 DIAGNOSIS — Z88.8: ICD-10-CM

## 2019-11-10 PROCEDURE — 99284 EMERGENCY DEPT VISIT MOD MDM: CPT

## 2019-11-10 PROCEDURE — 71046 X-RAY EXAM CHEST 2 VIEWS: CPT

## 2019-11-10 PROCEDURE — 94640 AIRWAY INHALATION TREATMENT: CPT

## 2019-11-10 NOTE — RAD REPORT
EXAM DESCRIPTION:  Pacheco Mortensen And Arvind (2 Views)11/10/2019 5:03 pm

 

CLINICAL HISTORY:  Cough

 

COMPARISON:  2018

 

FINDINGS:  Mild nodularity within the right upper lobe is without obvious change

 

Lungs appear clear of acute infiltrate

 

The heart is normal size

 

IMPRESSION:   Mild nodularity right upper lobe without obvious change likely benign. Follow up chest 
x-ray in 3 months recommended for re-evaluation

## 2019-11-10 NOTE — ER
Nurse's Notes                                                                                     

 [facility]                                                                 

Name: Ralph Yañez                                                                             

Age: 38 yrs                                                                                       

Sex: Male                                                                                         

: 1980                                                                                   

MRN: [id number]                                                                                   

Arrival Date: 11/10/2019                                                                          

Time: 16:10                                                                                       

Account#: [id number]                                                                            

Bed 18                                                                                            

Private MD:                                                                                       

Diagnosis: Acute bronchitis                                                                       

                                                                                                  

Presentation:                                                                                     

11/10                                                                                             

16:10 Presenting complaint: Patient states: cough and fever since Friday, have been on Z pack la1 

      for three days. Transition of care: patient was not received from another setting of        

      care. Onset of symptoms was November 10, 2019. Risk Assessment: Do you want to hurt         

      yourself or someone else? Patient reports no desire to harm self or others. Initial         

      Sepsis Screen: Does the patient meet any 2 criteria? No. Patient's initial sepsis           

      screen is negative. Does the patient have a suspected source of infection? Yes:             

      Productive cough/pneumonia. Care prior to arrival: None.                                    

16:10 Method Of Arrival: Ambulatory                                                           la1 

16:10 Acuity: JACEY 3                                                                           la1 

                                                                                                  

Historical:                                                                                       

- Allergies:                                                                                      

16:12 Dilantin;                                                                               la1 

- PMHx:                                                                                           

16:12 Asthma; hyperthyroidism; Pneumonia; seasonal allergies;                                 la1 

                                                                                                  

- Immunization history:: Adult Immunizations up to date.                                          

- Social history:: Smoking status: Patient/guardian denies using tobacco.                         

- Ebola Screening: : No symptoms or risks identified at this time.                                

                                                                                                  

                                                                                                  

Screenin:21 Abuse screen: Denies threats or abuse. Nutritional screening: No deficits noted.        em  

      Tuberculosis screening: No symptoms or risk factors identified. Fall Risk None              

      identified.                                                                                 

                                                                                                  

Assessment:                                                                                       

16:24 General: Appears in no apparent distress. comfortable, Behavior is calm, cooperative,   em  

      Denies fever. Pain: Denies pain. Neuro: Level of Consciousness is awake, alert, obeys       

      commands, Oriented to person, place, time, situation, Appropriate for age.                  

      Cardiovascular: Capillary refill < 3 seconds Patient's skin is warm and dry. Rhythm is      

      regular. Respiratory: Reports shortness of breath on exertion cough that is productive,     

      Airway is patent Respiratory effort is even, unlabored, Respiratory pattern is regular,     

      symmetrical, Breath sounds with wheezes bilaterally. GI: Abdomen is flat. Derm: Skin is     

      intact, is healthy with good turgor, Skin is pink, warm \T\ dry. Musculoskeletal:           

      Capillary refill < 3 seconds, Range of motion: intact in all extremities.                   

16:30 General: The previous assessment is accurate, call light remains within reach..         ss  

16:55 Reassessment: Patient appears in no apparent distress at this time. wheeled to CT via   em  

      wheelchair.                                                                                 

17:30 Reassessment: Patient appears in no apparent distress at this time. Patient and/or      em  

      family updated on plan of care and expected duration. Pain level reassessed. Patient is     

      alert, oriented x 3, equal unlabored respirations, skin warm/dry/pink. Patient states       

      feeling better. Patient states symptoms have improved.                                      

                                                                                                  

Vital Signs:                                                                                      

16:12  / 81; Pulse 93; Resp 16; Temp 98.6(TE); Pulse Ox 99% on R/A; Weight 83.91 kg;    la1 

      Height 5 ft. 9 in. (175.26 cm);                                                             

16:12 Body Mass Index 27.32 (83.91 kg, 175.26 cm)                                             la1 

                                                                                                  

ED Course:                                                                                        

16:10 Patient arrived in ED.                                                                  la1 

16:11 Triage completed.                                                                       la1 

16:11 Arm band placed on left wrist.                                                          la1 

16:13 Faizan Chung LVN is Primary Nurse.                                                     em  

16:13 Emigdio eLblanc PA is PHCP.                                                               jr8 

16:13 Kedar Yousif MD is Attending Physician.                                           jr8 

16:21 Patient has correct armband on for positive identification. Call light in reach.        em  

16:59 XRAY Chest Pa And Lat (2 Views) In Process Unspecified.                                 EDMS

17:29 No provider procedures requiring assistance completed. Patient did not have IV access   em  

      during this emergency room visit.                                                           

                                                                                                  

Administered Medications:                                                                         

16:31 Drug: predniSONE 60 mg Route: PO;                                                       em  

17:18 Follow up: Response: No adverse reaction                                                em  

16:31 Drug: Albuterol - atroVENT (3:1) (2.5 mg - 0.5 mg) 3 ml Route: Nebulizer;               em  

17:17 Follow up: Response: No adverse reaction; Marked relief of symptoms                     em  

                                                                                                  

                                                                                                  

Outcome:                                                                                          

17:20 Discharge ordered by MD.                                                                jr8 

17:29 Discharged to home ambulatory, with family.                                             em  

17:29 Condition: good                                                                             

17:29 Discharge instructions given to patient, Instructed on discharge instructions, follow       

      up and referral plans. medication usage, Demonstrated understanding of instructions,        

      follow-up care, medications, Prescriptions given X 2.                                       

17:30 Patient left the ED.                                                                    em  

                                                                                                  

Signatures:                                                                                       

Dispatcher [employer]                           Faizan Cortes, LVN                       LVN  em                                                   

Yessy Denton, RN                      RN   ss                                                   

Emigdio Leblanc PA PA   jr8                                                  

Ja Rubalcava RN                         RN   la1                                                  

                                                                                                  

**************************************************************************************************

## 2019-11-10 NOTE — EDPHYS
Physician Documentation                                                                           

 [facility]                                                                 

Name: Ralph Yañez                                                                             

Age: 38 yrs                                                                                       

Sex: Male                                                                                         

: 1980                                                                                   

MRN: [id number]                                                                                   

Arrival Date: 11/10/2019                                                                          

Time: 16:10                                                                                       

Account#: [id number]                                                                            

Bed 18                                                                                            

Private MD:                                                                                       

ED Physician Kedar Yousif                                                                    

HPI:                                                                                              

11/10                                                                                             

16:40 This 38 yrs old  Male presents to ER via Ambulatory with complaints of         jr8 

      Productive Cough.                                                                           

16:40 The patient or guardian reports cough, that is intermittent, described as moderate,     jr8 

      with productive sputum, that is green. Onset: The symptoms/episode began/occurred           

      gradually, 2 day(s) ago. Severity of symptoms: At their worst the symptoms were mild,       

      in the emergency department the symptoms are unchanged. Modifying factors: The symptoms     

      are alleviated by nothing, the symptoms are aggravated by nothing. Associated signs and     

      symptoms: The patient has no apparent associated signs or symptoms. The patient has not     

      experienced similar symptoms in the past. The patient has been recently seen by a           

      physician: the patient's primary care provider, with similar presenting complaints, was     

      given a prescription for antibiotics. having worsening of cough with wheezing .             

                                                                                                  

Historical:                                                                                       

- Allergies:                                                                                      

16:12 Dilantin;                                                                               la1 

- PMHx:                                                                                           

16:12 Asthma; hyperthyroidism; Pneumonia; seasonal allergies;                                 la1 

                                                                                                  

- Immunization history:: Adult Immunizations up to date.                                          

- Social history:: Smoking status: Patient/guardian denies using tobacco.                         

- Ebola Screening: : No symptoms or risks identified at this time.                                

                                                                                                  

                                                                                                  

ROS:                                                                                              

16:40 Eyes: Negative for injury, pain, redness, and discharge, ENT: Negative for injury,      jr8 

      pain, and discharge, Neck: Negative for injury, pain, and swelling, Cardiovascular:         

      Negative for chest pain, palpitations, and edema, Abdomen/GI: Negative for abdominal        

      pain, nausea, vomiting, diarrhea, and constipation, Back: Negative for injury and pain,     

      MS/Extremity: Negative for injury and deformity, Skin: Negative for injury, rash, and       

      discoloration, Neuro: Negative for headache, weakness, numbness, tingling, and seizure.     

16:40 Respiratory: Positive for cough, wheezing.                                                  

                                                                                                  

Exam:                                                                                             

16:40 Eyes:  Pupils equal round and reactive to light, extra-ocular motions intact.  Lids and jr8 

      lashes normal.  Conjunctiva and sclera are non-icteric and not injected.  Cornea within     

      normal limits.  Periorbital areas with no swelling, redness, or edema. ENT:  Nares          

      patent. No nasal discharge, no septal abnormalities noted.  Tympanic membranes are          

      normal and external auditory canals are clear.  Oropharynx with no redness, swelling,       

      or masses, exudates, or evidence of obstruction, uvula midline.  Mucous membranes           

      moist. Neck:  Trachea midline, no thyromegaly or masses palpated, and no cervical           

      lymphadenopathy.  Supple, full range of motion without nuchal rigidity, or vertebral        

      point tenderness.  No Meningismus. Cardiovascular:  Regular rate and rhythm with a          

      normal S1 and S2.  No gallops, murmurs, or rubs.  Normal PMI, no JVD.  No pulse             

      deficits. Abdomen/GI:  Soft, non-tender, with normal bowel sounds.  No distension or        

      tympany.  No guarding or rebound.  No evidence of tenderness throughout. Back:  No          

      spinal tenderness.  No costovertebral tenderness.  Full range of motion. Skin:  Warm,       

      dry with normal turgor.  Normal color with no rashes, no lesions, and no evidence of        

      cellulitis. MS/ Extremity:  Pulses equal, no cyanosis.  Neurovascular intact.  Full,        

      normal range of motion. Neuro:  Awake and alert, GCS 15, oriented to person, place,         

      time, and situation.  Cranial nerves II-XII grossly intact.  Motor strength 5/5 in all      

      extremities.  Sensory grossly intact.  Cerebellar exam normal.  Normal gait.                

16:40 Respiratory: the patient does not display signs of respiratory distress,  Respirations:     

      normal, symetrical, no use of accessory muscles, no grunting, no evidence of nasal          

      flaring, no prolonged exhalations, no pursed lip breathing, no retractions, no shallow      

      respirations, no splinting, no tachypnea, Breath sounds: wheezing: expiratory that is       

      mild, is heard diffusely.                                                                   

                                                                                                  

Vital Signs:                                                                                      

16:12  / 81; Pulse 93; Resp 16; Temp 98.6(TE); Pulse Ox 99% on R/A; Weight 83.91 kg;    la1 

      Height 5 ft. 9 in. (175.26 cm);                                                             

16:12 Body Mass Index 27.32 (83.91 kg, 175.26 cm)                                             la1 

                                                                                                  

MDM:                                                                                              

16:18 Patient medically screened.                                                             jr8 

17:19 Data reviewed: vital signs, nurses notes, radiologic studies, plain films, and as a     jr8 

      result, I will discharge patient. Data interpreted: Pulse oximetry: on room air is 99       

      %. Interpretation: normal. Test interpretation: by ED physician or midlevel provider:       

      plain radiologic studies, No consolidation noted to suggest pneumonia. No other acute       

      findings noted on CXR . Counseling: I had a detailed discussion with the patient and/or     

      guardian regarding: the historical points, exam findings, and any diagnostic results        

      supporting the discharge/admit diagnosis, radiology results, the need for outpatient        

      follow up, a family practitioner, to return to the emergency department if symptoms         

      worsen or persist or if there are any questions or concerns that arise at home. ED          

      course: Patient on zithromax currently and will continue it. Add prednisone and             

      albuterol .                                                                                 

                                                                                                  

11/10                                                                                             

16:18 Order name: XRAY Chest Pa And Lat (2 Views)                                             jr8 

                                                                                                  

Administered Medications:                                                                         

16:31 Drug: predniSONE 60 mg Route: PO;                                                       em  

17:18 Follow up: Response: No adverse reaction                                                em  

16:31 Drug: Albuterol - atroVENT (3:1) (2.5 mg - 0.5 mg) 3 ml Route: Nebulizer;               em  

17:17 Follow up: Response: No adverse reaction; Marked relief of symptoms                     em  

                                                                                                  

                                                                                                  

Disposition:                                                                                      

                                                                                             

16:49 Co-signature as Attending Physician, Kedar Yousif MD.                               ma2 

                                                                                                  

Disposition:                                                                                      

11/10/19 17:20 Discharged to Home. Impression: Acute bronchitis.                                  

- Condition is Stable.                                                                            

- Discharge Instructions: Acute Bronchitis, Adult.                                                

- Prescriptions for Prednisone 20 mg Oral Tablet - take 1 tablet by ORAL route once               

  daily for 5 days; 5 tablet. Albuterol Sulfate 90 mcg/actuation - inhale 1-2 puff by             

  INHALATION route every 4-6 hours; 1 Inhaler.                                                    

- Medication Reconciliation Form, Thank You Letter, Antibiotic Education, Prescription            

  Opioid Use form.                                                                                

- Follow up: Private Physician; When: 2 - 3 days; Reason: Recheck today's complaints,             

  Continuance of care, Re-evaluation by your physician.                                           

- Problem is new.                                                                                 

- Symptoms have improved.                                                                         

                                                                                                  

                                                                                                  

                                                                                                  

Signatures:                                                                                       

Dispatcher [employer]                           EDFaizan Reynolds, LVN                       LVN  em                                                   

Emigdio Leblanc PA PA   jr8                                                  

Ja Rubalcava RN                         RN   Kedar Bradley MD MD   ma2                                                  

                                                                                                  

Corrections: (The following items were deleted from the chart)                                    

11/10                                                                                             

17:30 17:20 11/10/2019 17:20 Discharged to Home. Impression: Acute bronchitis. Condition is   em  

      Stable. Forms are Medication Reconciliation Form, Thank You Letter, Antibiotic              

      Education, Prescription Opioid Use. Follow up: Private Physician; When: 2 - 3 days;         

      Reason: Recheck today's complaints, Continuance of care, Re-evaluation by your              

      physician. Problem is new. Symptoms have improved. jr8                                      

                                                                                                  

**************************************************************************************************

## 2019-11-11 NOTE — XMS REPORT
Summary of Care

 Created on:2019



Patient:Ralph Yañez

Sex:Male

:1980

External Reference #:[id number]





Demographics







 Address  [address]



   [place] [address]

 

 Mobile Phone  [phone]

 

 Home Phone  [phone]

 

 Email Address  [email or web address]

 

 Preferred Language  English

 

 Marital Status  

 

 Islam Affiliation  Unknown

 

 Race  White

 

 Ethnic Group  Not  or 









Author







 Organization  [facility]

 

 Address  [address] [place] [address]









Support







 Name  Relationship  Address  Phone

 

 Zoila Yañez  Unavailable  [address]  [phone]



     [place] [address]  









Care Team Providers







 Name  Role  Phone

 

 [place] Karthik JOAO  Primary Care Provider  [phone]









Reason for Visit







 Reason  Comments

 

 Assessment  







Encounter Details







 Date  Type  Department  Care Team  Description

 

 2019  Telephone  [facility] Endocrinology-  Ava Rico MD



  Assessment



     [address]



  



     Professional Office [address] Dr. Bonilla  [phone] (Fax)  



     [place] [address] [phone]    







Allergies







 Active Allergy  Reactions  Severity  Noted Date  Comments

 

 Phenytoin Sodium Extended  Hives    2016  



documented as of this encounter (statuses as of 2019)



Medications







 Medication  Sig  Dispensed  Refills  Start Date  End Date  Status

 

 FLUTICASONE/VILANTEROL  Inhale daily.    0      Active



 (BREO ELLIPTA INHALE)            

 

 meclizine (ANTIVERT)  TAKE 1 TABLET BY    0  2016    Active



 25 mg tablet  MOUTH EVERY 8          



   HOURS AS NEEDED          

 

 budesonide-formoterol  Inhale 2 Puffs 2    0      Active



 (SYMBICORT) 160-4.5  (two) times          



 mcg/actuation inhaler  daily.          

 

 ZITHROMAX Z-ELIZABETH 250 mg  Take 1 tablet by  1 Package  0  2017    Active



 dose pack  mouth          



   SEE-INSTRUCTIONS.          



   Take 500 mg day          



   1, then 250 mg          



   days 2 to 5.          

 

 ALBUTEROL 2.5 mg /3 mL  Inhale 3 mL every  40 mL  0  2017    Active



 (0.083 %) nebulizer  6 (six) hours as          



 solution  needed for          



   Wheezing or          



   Shortness of          



   Breath. May also          



   nebulize one          



   extra every 6          



   hours.          

 

 ALBUTEROL 90  Inhale 2 Puffs  8.5 g  1  2017    Active



 mcg/actuation inhaler  every 4 (four)          



   hours as needed          



   for Wheezing or          



   Shortness of          



   Breath.          

 

 oseltamivir (TAMIFLU)  Take 1 capsule by  10 capsule  0  2017    Active



 75 mg capsule  mouth 2 (two)          



   times daily.          

 

 Levothyroxine 137 mcg  Take 137 mcg by  90 capsule  3  2018    Active



 Cap  mouth daily.          



documented as of this encounter (statuses as of 2019)



Active Problems







 Problem  Noted Date

 

 Postsurgical hypothyroidism  2017

 

 S/P thyroidectomy  2017

 

 Hyperthyroidism  2016



documented as of this encounter (statuses as of 2019)



Social History







 Tobacco Use  Types  Packs/Day  Years Used  Date

 

 Never Smoker        









 Smokeless Tobacco: Never Used      









 Alcohol Use  Drinks/Week  oz/Week  Comments

 

 No  0 Standard drinks or equivalent  0.0  









 Sex Assigned at Birth  Date Recorded

 

 Not on file  









 Job Start Date  Occupation  Industry

 

 Not on file  Not on file  Not on file









 Travel History  Travel Start  Travel End









 No recent travel history available.



documented as of this encounter



Last Filed Vital Signs

Not on filedocumented in this encounter



Plan of Treatment







 Date  Type  Specialty  Care Team  Description

 

 2019  Office Visit  Endocrinology Diabetes &  Rony Smith



  



     Metabolism  [address] Ricardo [address] [place] [address]



  



       [phone] (Fax)  

 

 2020  Office Visit  Endocrinology Diabetes &  Ava Rico MD



  



     Metabolism  [facility] [place], [place] [address] [phone] (Fax)  









 Health Maintenance  Due Date  Last Done  Comments

 

 DTaP,Tdap,and Td Vaccines (1 -  1999    



 Tdap)      

 

 INFLUENZA VACCINE (#1)  2019    

 

 PNEUMOCOCCAL 0-64 YEARS COMBINED  Aged Out    No longer eligible based on



 SERIES      patient's age to complete this



       topic



documented as of this encounter



Results

Not on filedocumented in this encounter



Insurance







 Payer  Benefit Plan / Group  Subscriber ID  Effective Dates  Phone  Address  
Type

 

 AETNA  AETNA O  [id number]  2017-Present      HMO



documented as of this encounter

## 2019-11-11 NOTE — XMS REPORT
Summary of Care

 Created on:2019



Patient:Ralph Yañez

Sex:Male

:1980

External Reference #:[id number]





Demographics







 Address  [address]



   [place] [address]

 

 Mobile Phone  [phone]

 

 Home Phone  [phone]

 

 Email Address  [email or web address]

 

 Preferred Language  English

 

 Marital Status  

 

 Nondenominational Affiliation  Unknown

 

 Race  White

 

 Ethnic Group  Not  or 









Author







 Organization  [facility]

 

 Address  [address] [place] [address]









Support







 Name  Relationship  Address  Phone

 

 Zoila Yañez  Unavailable  [address]  [phone]



     [place] [address]  









Care Team Providers







 Name  Role  Phone

 

 Karthik [place] JOAO  Primary Care Provider  [phone]









Encounter Details







 Date  Type  Department  Care Team  Description

 

 2019  Orders Only  [facility]



  Doctor Unassigned, No  



     [address] [facility]



  Name



  



     [place] [address]  [address] [place] [address]  







Allergies







 Active Allergy  Reactions  Severity  Noted Date  Comments

 

 Phenytoin Sodium Extended  Hives    2016  



documented as of this encounter (statuses as of 2019)



Medications







 Medication  Sig  Dispensed  Refills  Start Date  End Date  Status

 

 FLUTICASONE/VILANTEROL  Inhale daily.    0      Active



 (BREO ELLIPTA INHALE)            

 

 meclizine (ANTIVERT)  TAKE 1 TABLET BY    0  2016    Active



 25 mg tablet  MOUTH EVERY 8          



   HOURS AS NEEDED          

 

 budesonide-formoterol  Inhale 2 Puffs 2    0      Active



 (SYMBICORT) 160-4.5  (two) times          



 mcg/actuation inhaler  daily.          

 

 ZITHROMAX Z-ELIZABETH 250 mg  Take 1 tablet by  1 Package  0  2017    Active



 dose pack  mouth          



   SEE-INSTRUCTIONS.          



   Take 500 mg day          



   1, then 250 mg          



   days 2 to 5.          

 

 ALBUTEROL 2.5 mg /3 mL  Inhale 3 mL every  40 mL  0  2017    Active



 (0.083 %) nebulizer  6 (six) hours as          



 solution  needed for          



   Wheezing or          



   Shortness of          



   Breath. May also          



   nebulize one          



   extra every 6          



   hours.          

 

 ALBUTEROL 90  Inhale 2 Puffs  8.5 g  1  2017    Active



 mcg/actuation inhaler  every 4 (four)          



   hours as needed          



   for Wheezing or          



   Shortness of          



   Breath.          

 

 oseltamivir (TAMIFLU)  Take 1 capsule by  10 capsule  0  2017    Active



 75 mg capsule  mouth 2 (two)          



   times daily.          

 

 Levothyroxine 137 mcg  Take 137 mcg by  90 capsule  3  2018    Active



 Cap  mouth daily.          



documented as of this encounter (statuses as of 2019)



Active Problems







 Problem  Noted Date

 

 Postsurgical hypothyroidism  2017

 

 S/P thyroidectomy  2017

 

 Hyperthyroidism  2016



documented as of this encounter (statuses as of 2019)



Social History







 Tobacco Use  Types  Packs/Day  Years Used  Date

 

 Never Smoker        









 Smokeless Tobacco: Never Used      









 Alcohol Use  Drinks/Week  oz/Week  Comments

 

 No  0 Standard drinks or equivalent  0.0  









 Sex Assigned at Birth  Date Recorded

 

 Not on file  









 Job Start Date  Occupation  Industry

 

 Not on file  Not on file  Not on file









 Travel History  Travel Start  Travel End









 No recent travel history available.



documented as of this encounter



Last Filed Vital Signs

Not on filedocumented in this encounter



Plan of Treatment







 Date  Type  Specialty  Care Team  Description

 

 2019  Office Visit  Endocrinology Diabetes &  Rony Smith



     Metabolism  [address] [facility] [address] Ricardo [address] [place] [address]



  



       [phone] (Fax)  

 

 2020  Office Visit  Endocrinology Diabetes &  Ava Rico MD



  



     Metabolism  [facility] [place], [place] [address] [phone] (Fax)  









 Health Maintenance  Due Date  Last Done  Comments

 

 DTaP,Tdap,and Td Vaccines (1 -  1999    



 Tdap)      

 

 INFLUENZA VACCINE (#1)  2019    

 

 PNEUMOCOCCAL 0-64 YEARS COMBINED  Aged Out    No longer eligible based on



 SERIES      patient's age to complete this



       topic



documented as of this encounter



Procedures







 Procedure Name  Priority  Date/Time  Associated Diagnosis  Comments

 

 CONSENT/REFUSAL FOR  Routine  2019  3:22 PM    



 DIAGNOSIS AND TREATMENT    CDT    

 

 ASSIGNMENT OF BENEFITS  Routine  2019  3:22 PM    



     CDT    



documented in this encounter



Results

Not on filedocumented in this encounter



Insurance







 Payer  Benefit Plan / Group  Subscriber ID  Effective Dates  Phone  Address  
Type

 

 AETNA  AETNA O  [id number]  2017-Present      HMO



documented as of this encounter

## 2019-11-11 NOTE — XMS REPORT
Patient Summary Document

 Created on:2019



Patient:JERMAINE ZIEGLER

Sex:Male

:1980

External Reference #:[id number]





Demographics







 Address  [address] [address]

 

 Home Phone  [phone]

 

 Email Address  [email or web address]

 

 Preferred Language  Unknown

 

 Marital Status  Unknown

 

 Confucianism Affiliation  Unknown

 

 Race  Unknown

 

 Additional Race(s)  Unavailable

 

 Ethnic Group  Unknown









Author







 Organization  [facility]

 

 Address  [address] [place] [address] Dr. Johnson [address] [place] [address]

 

 Phone  [phone]









Care Team Providers







 Name  Role  Phone

 

 Unavailable  Unavailable  Unavailable









Problems

This patient has no known problems.



Allergies, Adverse Reactions, Alerts

This patient has no known allergies or adverse reactions.



Medications

This patient has no known medications.

## 2019-12-29 ENCOUNTER — HOSPITAL ENCOUNTER (EMERGENCY)
Dept: HOSPITAL 97 - ER | Age: 39
Discharge: HOME | End: 2019-12-29
Payer: SELF-PAY

## 2019-12-29 VITALS — OXYGEN SATURATION: 99 %

## 2019-12-29 VITALS — SYSTOLIC BLOOD PRESSURE: 121 MMHG | TEMPERATURE: 97.5 F | DIASTOLIC BLOOD PRESSURE: 75 MMHG

## 2019-12-29 DIAGNOSIS — J45.901: Primary | ICD-10-CM

## 2019-12-29 DIAGNOSIS — E05.90: ICD-10-CM

## 2019-12-29 DIAGNOSIS — J30.2: ICD-10-CM

## 2019-12-29 DIAGNOSIS — Z88.8: ICD-10-CM

## 2019-12-29 PROCEDURE — 71045 X-RAY EXAM CHEST 1 VIEW: CPT

## 2019-12-29 PROCEDURE — 96372 THER/PROPH/DIAG INJ SC/IM: CPT

## 2019-12-29 PROCEDURE — 99284 EMERGENCY DEPT VISIT MOD MDM: CPT

## 2019-12-29 NOTE — ER
Nurse's Notes                                                                                     

 [facility]                                                                 

Name: Ralph Yañez                                                                             

Age: 39 yrs                                                                                       

Sex: Male                                                                                         

: 1980                                                                                   

MRN: [id number]                                                                                   

Arrival Date: 2019                                                                          

Time: 04:36                                                                                       

Account#: [id number]                                                                            

Bed 6                                                                                             

Private MD:                                                                                       

Diagnosis: Unspecified asthma with (acute) exacerbation                                           

                                                                                                  

Presentation:                                                                                     

                                                                                             

04:44 Presenting complaint: Patient states: started to have asthma attack around 0100 today I rr5 

      took albuterol 2 doses i feel relieved but it went back.                                    

04:44 Transition of care: patient was not received from another setting of care. Onset of     rr5 

      symptoms was 2019 at 01:00. Risk Assessment: Do you want to hurt yourself      

      or someone else? Patient reports no desire to harm self or others. Initial Sepsis           

      Screen: Does the patient meet any 2 criteria? RR > 20 per min. No. Patient's initial        

      sepsis screen is negative. Does the patient have a suspected source of infection? No.       

      Patient's initial sepsis screen is negative. Care prior to arrival: Medication(s)           

      given: Albuterol Neb x 2.                                                                   

04:44 Method Of Arrival: Ambulatory                                                           rr5 

04:44 Acuity: JACEY 3                                                                           rr5 

                                                                                                  

Historical:                                                                                       

- Allergies:                                                                                      

04:47 Dilantin;                                                                               rr5 

- Home Meds:                                                                                      

04:47 albuterol sulfate 0.63 mg/3 mL Inhl nebu [Active]; Flonase 50 mcg/actuation Nasal spsn  rr5 

      1 spray 2 times per day [Active]; levothyroxine oral [Active]; Zyrtec 10 mg Oral chew 1     

      tab once daily [Active];                                                                    

- PMHx:                                                                                           

04:47 Asthma; hyperthyroidism; Pneumonia; seasonal allergies;                                 rr5 

- PSHx:                                                                                           

04:47 Thyroidectomy;                                                                          rr5 

                                                                                                  

- Immunization history:: Adult Immunizations unknown.                                             

- Social history:: Smoking status: Patient/guardian denies using tobacco,                         

  Patient/guardian denies using alcohol, street drugs.                                            

- Ebola Screening: : Patient negative for fever greater than or equal to 101.5 degrees            

  Fahrenheit, and additional compatible Ebola Virus Disease symptoms Patient denies               

  exposure to infectious person Patient denies travel to an Ebola-affected area in the            

  21 days before illness onset.                                                                   

- Family history:: not pertinent.                                                                 

- Hospitalizations: : No recent hospitalization is reported.                                      

                                                                                                  

                                                                                                  

Screenin:58 Abuse screen: Denies threats or abuse. Denies injuries from another. Nutritional        rr5 

      screening: No deficits noted. Tuberculosis screening: No symptoms or risk factors           

      identified. Fall Risk None identified. Total Martinez Fall Scale indicates No Risk (0-24       

      pts).                                                                                       

                                                                                                  

Assessment:                                                                                       

04:48 General: Appears uncomfortable, mild distress. Behavior is calm, cooperative,           rr5 

      appropriate for age. Pain: Denies pain. Neuro: Level of Consciousness is awake, alert,      

      obeys commands, Oriented to person, place, time, situation, Appropriate for age.            

      Cardiovascular: Capillary refill < 3 seconds Patient's skin is warm and dry.                

      Respiratory: Reports shortness of breath cough that is Airway is patent Respiratory         

      effort is using tripod position, Respiratory pattern is tachypnea Breath sounds with        

      wheezes bilaterally. the patient has mild shortness of breath. GI: No signs and/or          

      symptoms were reported involving the gastrointestinal system. : No signs and/or           

      symptoms were reported regarding the genitourinary system. EENT: No signs and/or            

      symptoms were reported regarding the EENT system. Derm: Skin is intact, Skin                

      temperature is warm. Musculoskeletal: Capillary refill < 3 seconds.                         

05:30 Reassessment: Patient appears in no apparent distress at this time. Patient is alert,   rr5 

      oriented x 3, equal unlabored respirations, skin warm/dry/pink. Patient states feeling      

      better. Patient states symptoms have improved.                                              

06:30 Reassessment: Patient appears in no apparent distress at this time. Patient is alert,   rr5 

      oriented x 3, equal unlabored respirations, skin warm/dry/pink. discharge instruction       

      given and explained without complaints made, verbalized understanding. Patient denies       

      pain at this time. Patient states feeling better. Patient states symptoms have improved.    

                                                                                                  

Vital Signs:                                                                                      

04:44  / 92; Pulse 80; Resp 28; Temp 98.5; Pulse Ox 99% ; Weight 81.65 kg; Height 5 ft. rr5 

      7 in. (170.18 cm); Pain 0/10;                                                               

06:06  / 69 LA Supine (auto/lg); Pulse 78 MON; Resp 18 S; Pulse Ox 99% on R/A; Pain     ds4 

      0/10;                                                                                       

06:48  / 75; Pulse 75; Resp 19; Temp 97.5; Pulse Ox 99% on R/A;                         rr5 

04:44 Body Mass Index 28.19 (81.65 kg, 170.18 cm)                                             rr5 

                                                                                                  

ED Course:                                                                                        

04:36 Patient arrived in ED.                                                                  ds1 

04:38 Kenneth Sandy, RN is Primary Nurse.                                                    rr5 

04:45 Lio Trotter MD is Attending Physician.                                                rn  

04:46 Triage completed.                                                                       rr5 

04:48 Arm band placed on right wrist.                                                         rr5 

04:58 Patient has correct armband on for positive identification. Placed in gown. Bed in low  rr5 

      position. Call light in reach. Pulse ox on. NIBP on.                                        

05:22 XRAY Chest (1 view) In Process Unspecified.                                             EDMS

06:49 No provider procedures requiring assistance completed. Patient did not have IV access   rr5 

      during this emergency room visit.                                                           

                                                                                                  

Administered Medications:                                                                         

04:50 Drug: SOLU-Medrol 125 mg Route: IM; Site: left gluteus;                                 rr5 

05:50 Follow up: Response: No adverse reaction                                                rr5 

04:58 Drug: Xopenex (3) 1.25 mg Route: Inhalation;                                            rr5 

06:00 Follow up: Response: No adverse reaction; Marked relief of symptoms; Wheezing diminishedrr5 

06:22 Drug: Xopenex 1.25 mg Route: Inhalation;                                                rr5 

06:58 Follow up: Response: No adverse reaction; Marked relief of symptoms                     rr5 

06:22 Drug: AtroVENT Aerosol 0.5 mg Route: Inhalation;                                        rr5 

06:58 Follow up: Response: No adverse reaction; Marked relief of symptoms                     rr5 

                                                                                                  

                                                                                                  

Outcome:                                                                                          

06:25 Discharge ordered by MD.                                                                rn  

06:49 Discharged to home ambulatory.                                                          rr5 

06:49 Condition: stable                                                                           

06:49 Discharge instructions given to patient, Instructed on discharge instructions, follow       

      up and referral plans. medication usage, Demonstrated understanding of instructions,        

      follow-up care, medications, Prescriptions given X 3.                                       

06:58 Patient left the ED.                                                                    rr5 

                                                                                                  

Signatures:                                                                                       

Dispatcher [employer]                           [facility]                                ds1                                                  

Lio Trotter MD MD rn Swanson, Donovan                             ds4                                                  

Kenneth Sandy RN                      RN   rr5                                                  

                                                                                                  

**************************************************************************************************

## 2019-12-29 NOTE — XMS REPORT
Patient Summary Document

 Created on:2019



Patient:JERMAINE ZIEGLER

Sex:Male

:1980

External Reference #:[id number]





Demographics







 Address  [address] [address]

 

 Home Phone  [phone]

 

 Email Address  [email or web address]

 

 Preferred Language  Unknown

 

 Marital Status  Unknown

 

 Zoroastrianism Affiliation  Unknown

 

 Race  Unknown

 

 Additional Race(s)  Unavailable

 

 Ethnic Group  Unknown









Author







 Organization  [facility]

 

 Address  [address] [place] [address] Dr. Johnson [address] [place] [address]

 

 Phone  [phone]









Care Team Providers







 Name  Role  Phone

 

 Unavailable  Unavailable  Unavailable









Problems

This patient has no known problems.



Allergies, Adverse Reactions, Alerts

This patient has no known allergies or adverse reactions.



Medications

This patient has no known medications.

## 2019-12-29 NOTE — EDPHYS
Physician Documentation                                                                           

 [facility]                                                                 

Name: Ralph Yañez                                                                             

Age: 39 yrs                                                                                       

Sex: Male                                                                                         

: 1980                                                                                   

MRN: [id number]                                                                                   

Arrival Date: 2019                                                                          

Time: 04:36                                                                                       

Account#: [id number]                                                                            

Bed 6                                                                                             

Private MD:                                                                                       

ED Physician Lio Trotter                                                                         

HPI:                                                                                              

                                                                                             

05:57 This 39 yrs old  Male presents to ER via Ambulatory with complaints of Asthma  rn  

      Exacerbation.                                                                               

05:57 The patient presents to the emergency department with wheezing, the patient was         rn  

      reported to have audible wheezing, chest congestion. Onset: The symptoms/episode            

      began/occurred yesterday. Modifying factors: The symptoms are alleviated by nebulizer       

      treatment, the symptoms are aggravated by nothing. Severity of symptoms: At their worst     

      the symptoms were moderate in the emergency department the symptoms have improved. The      

      patient has experienced similar episodes in the past. The patient has not recently seen     

      a physician. Reports asthma attack, began last night, took one neb with some                

      improvement, but felt it coming back so came in for eval. No fever. + dry cough. No         

      hemoptysis. .                                                                               

                                                                                                  

Historical:                                                                                       

- Allergies:                                                                                      

04:47 Dilantin;                                                                               rr5 

- Home Meds:                                                                                      

04:47 albuterol sulfate 0.63 mg/3 mL Inhl nebu [Active]; Flonase 50 mcg/actuation Nasal spsn  rr5 

      1 spray 2 times per day [Active]; levothyroxine oral [Active]; Zyrtec 10 mg Oral chew 1     

      tab once daily [Active];                                                                    

- PMHx:                                                                                           

04:47 Asthma; hyperthyroidism; Pneumonia; seasonal allergies;                                 rr5 

- PSHx:                                                                                           

04:47 Thyroidectomy;                                                                          rr5 

                                                                                                  

- Immunization history:: Adult Immunizations unknown.                                             

- Social history:: Smoking status: Patient/guardian denies using tobacco,                         

  Patient/guardian denies using alcohol, street drugs.                                            

- Ebola Screening: : Patient negative for fever greater than or equal to 101.5 degrees            

  Fahrenheit, and additional compatible Ebola Virus Disease symptoms Patient denies               

  exposure to infectious person Patient denies travel to an Ebola-affected area in the            

  21 days before illness onset.                                                                   

- Family history:: not pertinent.                                                                 

- Hospitalizations: : No recent hospitalization is reported.                                      

                                                                                                  

                                                                                                  

ROS:                                                                                              

05:57 Constitutional: Negative for fever, chills, and weight loss, Eyes: Negative for injury, rn  

      pain, redness, and discharge, Neck: Negative for injury, pain, and swelling,                

      Cardiovascular: Negative for chest pain, palpitations, and edema, Respiratory: + sob        

      and wheezing Abdomen/GI: Negative for abdominal pain, nausea, vomiting, diarrhea, and       

      constipation, MS/Extremity: Negative for injury and deformity, Skin: Negative for           

      injury, rash, and discoloration, Neuro: Negative for headache, weakness, numbness,          

      tingling, and seizure.                                                                      

                                                                                                  

Exam:                                                                                             

05:57 Constitutional:  This is a well developed, well nourished patient who is awake, alert,  rn  

      and in no acute distress. Head/Face:  Normocephalic, atraumatic. ENT:  MMM, no stridor      

      Cardiovascular:  Regular rate and rhythm.  No pulse deficits. Respiratory:  + mild          

      tachypnea with diffuse inspiratory and exp wheezing Abdomen/GI:  soft, non-tender MS/       

      Extremity:  Pulses equal, no cyanosis.  Neurovascular intact.  Full, normal range of        

      motion.  Equal circumference. Neuro:  Awake and alert, GCS 15, oriented to person,          

      place, time, and situation.  Cranial nerves II-XII grossly intact.  Motor strength 5/5      

      in all extremities.  Sensory grossly intact.  Cerebellar exam normal.  Normal gait.         

                                                                                                  

Vital Signs:                                                                                      

04:44  / 92; Pulse 80; Resp 28; Temp 98.5; Pulse Ox 99% ; Weight 81.65 kg; Height 5 ft. rr5 

      7 in. (170.18 cm); Pain 0/10;                                                               

06:06  / 69 LA Supine (auto/lg); Pulse 78 MON; Resp 18 S; Pulse Ox 99% on R/A; Pain     ds4 

      0/10;                                                                                       

06:48  / 75; Pulse 75; Resp 19; Temp 97.5; Pulse Ox 99% on R/A;                         rr5 

04:44 Body Mass Index 28.19 (81.65 kg, 170.18 cm)                                             rr5 

                                                                                                  

MDM:                                                                                              

04:45 Patient medically screened.                                                             rn  

06:22 Differential diagnosis: acute asthma, URI. Data reviewed: vital signs, nurses notes,    rn  

      radiologic studies, plain films, and as a result, I will discharge patient. Counseling:     

      I had a detailed discussion with the patient and/or guardian regarding: the historical      

      points, exam findings, and any diagnostic results supporting the discharge/admit            

      diagnosis, radiology results, the need for outpatient follow up, to return to the           

      emergency department if symptoms worsen or persist or if there are any questions or         

      concerns that arise at home. Response to treatment: the patient's symptoms have             

      markedly improved after treatment, and as a result, I will discharge patient. Special       

      discussion: I discussed with the patient/guardian in detail that at this point there is     

      no indication for admission to the hospital. It is understood, however, that if the         

      symptoms persist or worsen the patient needs to return immediately for re-evaluation.       

06:22 Test interpretation: by ED physician or midlevel provider: plain radiologic studies,    rn  

      Xray chest without pneumonia or pneumothorax. ED course: Neg CXR, afebrile,                 

      non-productive cough, will dc home with steroids and continuation of nebulizer. No          

      indication for abx at this point. Feels much better. .                                      

                                                                                                  

                                                                                             

04:48 Order name: XRAY Chest (1 view)                                                         rn  

                                                                                                  

Administered Medications:                                                                         

04:50 Drug: SOLU-Medrol 125 mg Route: IM; Site: left gluteus;                                 rr5 

05:50 Follow up: Response: No adverse reaction                                                rr5 

04:58 Drug: Xopenex (3) 1.25 mg Route: Inhalation;                                            rr5 

06:00 Follow up: Response: No adverse reaction; Marked relief of symptoms; Wheezing diminishedrr5 

06:22 Drug: Xopenex 1.25 mg Route: Inhalation;                                                rr5 

06:58 Follow up: Response: No adverse reaction; Marked relief of symptoms                     rr5 

06:22 Drug: AtroVENT Aerosol 0.5 mg Route: Inhalation;                                        rr5 

06:58 Follow up: Response: No adverse reaction; Marked relief of symptoms                     rr5 

                                                                                                  

                                                                                                  

Disposition:                                                                                      

19 06:25 Discharged to Home. Impression: Unspecified asthma with (acute) exacerbation.      

- Condition is Stable.                                                                            

- Discharge Instructions: Asthma, Adult.                                                          

- Prescriptions for Prednisone 20 mg Oral Tablet - take 3 tablet by ORAL route once               

  daily for 5 days; 15 tablet. Albuterol Sulfate 2.5 mg /3 mL (0.083 %) Inhalation                

  Solution for Nebulization - inhale 1 unit by NEBULIZATION route every 8 hours As                

  needed; 1 box. Albuterol Sulfate 90 mcg/actuation - inhale 1-2 puff by INHALATION               

  route every 4-6 hours; 1 Inhaler.                                                               

- Medication Reconciliation Form, Thank You Letter, Antibiotic Education, Prescription            

  Opioid Use form.                                                                                

- Follow up: Private Physician; When: As needed; Reason: Recheck today's complaints,              

  Re-evaluation by your physician.                                                                

- Problem is new.                                                                                 

- Symptoms have improved.                                                                         

                                                                                                  

                                                                                                  

                                                                                                  

Signatures:                                                                                       

Dispatcher [employer]                           EDLio Lutz MD MD rn Roque, Raymond, RN RN   rr5                                                  

                                                                                                  

Corrections: (The following items were deleted from the chart)                                    

06:58 06:25 2019 06:25 Discharged to Home. Impression: Unspecified asthma with (acute)  rr5 

      exacerbation. Condition is Stable. Forms are Medication Reconciliation Form, Thank You      

      Letter, Antibiotic Education, Prescription Opioid Use. Follow up: Private Physician;        

      When: As needed; Reason: Recheck today's complaints, Re-evaluation by your physician.       

      Problem is new. Symptoms have improved. rn                                                  

                                                                                                  

**************************************************************************************************

## 2019-12-30 NOTE — RAD REPORT
EXAM DESCRIPTION:  RAD - Chest Single View - 12/29/2019 5:22 am

 

 

CLINICAL HISTORY:  COUGH

 

COMPARISON:  None.

 

TECHNIQUE:  XR CHEST 1 VIEW 12/29/2019 4:48 AM CST

 

FINDINGS:  Cardiac silhouette is normal in size. Lungs are clear without consolidation, atelectasis, 
mass or edema. There is no pleural effusion. There is no pneumothorax. There are no acute osseous fin
dings.

 

IMPRESSION:  Clear lungs.

 

Electronically signed by:   Nickolas Beverly MD   12/29/2019 6:07 AM CST Workstation: [id number]

 

 

Due to temporary technical issues with the PACS/Fluency reporting system, reports are being signed by
 the in house radiologist as a courtesy to ensure prompt reporting. The interpreting radiologist is f
ully responsible for the content of the report.

## 2020-03-26 ENCOUNTER — HOSPITAL ENCOUNTER (EMERGENCY)
Dept: HOSPITAL 97 - ER | Age: 40
Discharge: HOME | End: 2020-03-26
Payer: SELF-PAY

## 2020-03-26 DIAGNOSIS — J45.901: Primary | ICD-10-CM

## 2020-03-26 DIAGNOSIS — Z88.8: ICD-10-CM

## 2020-03-26 PROCEDURE — 87070 CULTURE OTHR SPECIMN AEROBIC: CPT

## 2020-03-26 PROCEDURE — 94640 AIRWAY INHALATION TREATMENT: CPT

## 2020-03-26 PROCEDURE — 99284 EMERGENCY DEPT VISIT MOD MDM: CPT

## 2020-03-26 PROCEDURE — 87081 CULTURE SCREEN ONLY: CPT

## 2020-03-26 NOTE — EDPHYS
Physician Documentation                                                                           

 [facility]                                                                 

Name: Ralph Yañez                                                                             

Age: 39 yrs                                                                                       

Sex: Male                                                                                         

: 1980                                                                                   

MRN: [id number]                                                                                   

Arrival Date: 2020                                                                          

Time: 14:57                                                                                       

Account#: [id number]                                                                            

Bed 5                                                                                             

Private MD:                                                                                       

ED Physician Rony Strong                                                                       

HPI:                                                                                              

                                                                                             

18:07 This 39 yrs old  Male presents to ER via Ambulatory with complaints of Throat  kb  

      Problem.                                                                                    

18:07 The patient presents with sore throat. The patient describes throat pain as constant.   kb  

      Onset: The symptoms/episode began/occurred yesterday. Severity of symptoms: At their        

      worst the symptoms were mild, in the emergency department the symptoms are unchanged.       

      Modifying factors: The symptoms are alleviated by nothing, the symptoms are aggravated      

      by swallowing, Patient's oral intake status: good. Associated signs and symptoms:           

      Pertinent positives: Sore throat. The patient has not experienced similar symptoms in       

      the past. The patient has not recently seen a physician. Pt reports tightness in throat     

      since yesterday. Goes away after a neb treatment, then builds back up. .                    

                                                                                                  

Historical:                                                                                       

- Allergies:                                                                                      

15:14 Dilantin;                                                                               ca1 

- PMHx:                                                                                           

15:14 Asthma; hyperthyroidism; Pneumonia; seasonal allergies;                                 ca1 

- PSHx:                                                                                           

15:14 Thyroidectomy;                                                                          ca1 

                                                                                                  

- Immunization history:: Adult Immunizations up to date, Flu vaccine is up to date.               

- Social history:: Smoking status: Patient denies any tobacco usage or history of.                

                                                                                                  

                                                                                                  

ROS:                                                                                              

18:00 Constitutional: Negative for fever, chills, and weight loss, Neck: Negative for injury, kb  

      pain, and swelling, Cardiovascular: Negative for chest pain, palpitations, and edema,       

      Respiratory: Negative for shortness of breath, cough, wheezing, and pleuritic chest         

      pain, Abdomen/GI: Negative for abdominal pain, nausea, vomiting, diarrhea, and              

      constipation, Back: Negative for injury and pain, MS/Extremity: Negative for injury and     

      deformity, Skin: Negative for injury, rash, and discoloration, Neuro: Negative for          

      headache, weakness, numbness, tingling, and seizure.                                        

18:00 ENT: Positive for sore throat.                                                              

                                                                                                  

Exam:                                                                                             

18:00 Constitutional:  This is a well developed, well nourished patient who is awake, alert,  kb  

      and in no acute distress. Head/Face:  Normocephalic, atraumatic. ENT:  Nares patent. No     

      nasal discharge, no septal abnormalities noted.  Tympanic membranes are normal and          

      external auditory canals are clear.  Oropharynx with no redness, swelling, or masses,       

      exudates, or evidence of obstruction, uvula midline.  Mucous membranes moist. Neck:         

      Trachea midline, no thyromegaly or masses palpated, and no cervical lymphadenopathy.        

      Supple, full range of motion without nuchal rigidity, or vertebral point tenderness.        

      No Meningismus. Chest/axilla:  Normal chest wall appearance and motion.  Nontender with     

      no deformity.  No lesions are appreciated. Cardiovascular:  Regular rate and rhythm         

      with a normal S1 and S2.  No gallops, murmurs, or rubs.  Normal PMI, no JVD.  No pulse      

      deficits. Abdomen/GI:  Soft, non-tender, with normal bowel sounds.  No distension or        

      tympany.  No guarding or rebound.  No evidence of tenderness throughout. Skin:  Warm,       

      dry with normal turgor.  Normal color with no rashes, no lesions, and no evidence of        

      cellulitis. MS/ Extremity:  Pulses equal, no cyanosis.  Neurovascular intact.  Full,        

      normal range of motion. Neuro:  Awake and alert, GCS 15, oriented to person, place,         

      time, and situation.  Cranial nerves II-XII grossly intact.  Motor strength 5/5 in all      

      extremities.  Sensory grossly intact.  Cerebellar exam normal.  Normal gait.                

18:00 Respiratory: the patient does not display signs of respiratory distress,  Respirations:     

      normal, symetrical, Breath sounds: wheezing: inspiratory expiratory that is moderate,       

      is scattered.                                                                               

                                                                                                  

Vital Signs:                                                                                      

15:11  / 95; Pulse 79; Resp 17 S; Temp 97.9(TE); Pulse Ox 99% on R/A; Weight 81.65 kg   ca1 

      (R); Height 5 ft. 9 in. (175.26 cm) (R);                                                    

17:31  / 102; Pulse 59; Resp 16; Pulse Ox 100% ;                                        bp  

15:11 Body Mass Index 26.58 (81.65 kg, 175.26 cm)                                             ca1 

                                                                                                  

MDM:                                                                                              

16:21 Patient medically screened.                                                             kb  

17:14 Data reviewed: vital signs, nurses notes. Data interpreted: Pulse oximetry: is 99 %.    kb  

17:36 Counseling: I had a detailed discussion with the patient and/or guardian regarding: the kb  

      historical points, exam findings, and any diagnostic results supporting the                 

      discharge/admit diagnosis, lab results, the need for outpatient follow up, a family         

      practitioner, to return to the emergency department if symptoms worsen or persist or if     

      there are any questions or concerns that arise at home.                                     

                                                                                                  

                                                                                             

16:26 Order name: Strep; Complete Time: 17:36                                                 kb  

                                                                                             

17:37 Order name: Throat Culture                                                              EDMS

                                                                                                  

Administered Medications:                                                                         

17:12 Drug: predniSONE 60 mg Route: PO;                                                       em  

17:17 Follow up: Response: No adverse reaction                                                bp  

17:17 Not Given (CANCELLED PER UNIT POLICY): DuoNeb (3:1) (2.5 mg - 0.5 mg) 3 ml Nebulizer    bp  

      once                                                                                        

17:31 Drug: DuoNeb (3:1) (2.5 mg - 0.5 mg) 3 ml Route: Nebulizer;                             bp  

17:32 Follow up: Response: No adverse reaction                                                bp  

                                                                                                  

                                                                                                  

Disposition:                                                                                      

                                                                                             

07:08 Co-signature as Attending Physician, Rony Strong MD I agree with the assessment and   kdr 

      plan of care.                                                                               

                                                                                                  

Disposition:                                                                                      

20 17:37 Discharged to Home. Impression: Unspecified asthma with (acute) exacerbation.      

- Condition is Stable.                                                                            

- Discharge Instructions: Asthma, Adult, Easy-to-Read.                                            

- Prescriptions for Prednisone 20 mg Oral Tablet - take 1 tablet by ORAL route once               

  daily for 5 days; 5 tablet.                                                                     

- Medication Reconciliation Form, Thank You Letter, Antibiotic Education, Prescription            

  Opioid Use, Work release form form.                                                             

- Follow up: Emergency Department; When: As needed; Reason: Worsening of condition.               

  Follow up: Private Physician; When: 2 - 3 days; Reason: Recheck today's complaints,             

  Continuance of care, Re-evaluation by your physician.                                           

                                                                                                  

                                                                                                  

                                                                                                  

Signatures:                                                                                       

Dispatcher [employer]                           EDMS                                                 

Wanda Fortune, FNP-C                 FNP-Ckb                                                   

Rony Strong MD MD kdr Munoz, Edgar, RN                        RN   Case Carlson RN RN bp Acob, Cheryl, RN                        RN   ca1                                                  

                                                                                                  

Corrections: (The following items were deleted from the chart)                                    

                                                                                             

18:13 17:37 2020 17:37 Discharged to Home. Impression: Unspecified asthma with (acute)  bp  

      exacerbation. Condition is Stable. Forms are Medication Reconciliation Form, Thank You      

      Letter, Antibiotic Education, Prescription Opioid Use. Follow up: Emergency Department;     

      When: As needed; Reason: Worsening of condition. Follow up: Private Physician; When: 2      

      - 3 days; Reason: Recheck today's complaints, Continuance of care, Re-evaluation by         

      your physician. kb                                                                          

                                                                                                  

**************************************************************************************************

## 2020-03-26 NOTE — XMS REPORT
Patient Summary Document

 Created on:2020



Patient:JERMAINE ZIEGLER

Sex:Male

:1980

External Reference #:[id number]





Demographics







 Address  [address] [address]

 

 Home Phone  [phone]

 

 Email Address  [email or web address]

 

 Preferred Language  Unknown

 

 Marital Status  Unknown

 

 Yazdanism Affiliation  Unknown

 

 Race  Unknown

 

 Additional Race(s)  Unavailable

 

 Ethnic Group  Unknown









Author







 Organization  [facility]

 

 Address  [address] [place] [address] [address]. Johnson [address] [place] [address]

 

 Phone  [phone]









Care Team Providers







 Name  Role  Phone

 

 Unavailable  Unavailable  Unavailable









Problems

This patient has no known problems.



Allergies, Adverse Reactions, Alerts

This patient has no known allergies or adverse reactions.



Medications

This patient has no known medications.

## 2020-03-26 NOTE — ER
Nurse's Notes                                                                                     

 [facility]                                                                 

Name: Ralph Yañez                                                                             

Age: 39 yrs                                                                                       

Sex: Male                                                                                         

: 1980                                                                                   

MRN: [id number]                                                                                   

Arrival Date: 2020                                                                          

Time: 14:57                                                                                       

Account#: [id number]                                                                            

Bed 5                                                                                             

Private MD:                                                                                       

Diagnosis: Unspecified asthma with (acute) exacerbation                                           

                                                                                                  

Presentation:                                                                                     

                                                                                             

15:11 Chief complaint: Patient states: "I don't know if it's my Asthma but I feel like my     ca1 

      throat is tight. I have a little cough". Denies fever and nasal congestion. Coronavirus     

      screen: Patient reports a cough. Patient denies shortness of breath or difficulty           

      breathing. Patient denies measured and/or subjective temperature greater than 100.4F.       

      Patient denies travel on a cruise ship or to a country the [facility] currently lists as an        

      affected area. Patient denies contact with known and/or suspected case of COVID-19.         

      Ebola Screen: Patient negative for fever greater than or equal to 101.5 degrees             

      Fahrenheit, and additional compatible Ebola Virus Disease symptoms Patient denies           

      exposure to infectious person. Patient denies travel to an Ebola-affected area in the       

      21 days before illness onset. No symptoms or risks identified at this time. Initial         

      Sepsis Screen: Does the patient meet any 2 criteria? No. Patient's initial sepsis           

      screen is negative. Does the patient have a suspected source of infection? No.              

      Patient's initial sepsis screen is negative. Risk Assessment: Do you want to hurt           

      yourself or someone else? Patient reports no desire to harm self or others. Onset of        

      symptoms was 2020.                                                                

15:11 Method Of Arrival: Ambulatory                                                           ca1 

15:11 Acuity: JACEY 3                                                                           ca1 

                                                                                                  

Triage Assessment:                                                                                

15:15 EENT: Throat is clear.                                                                  ca1 

15:15 General: Appears in no apparent distress. comfortable, Behavior is cooperative,         bp  

      appropriate for age, anxious. Pain: Complains of pain in THROAT. EENT: Throat is clear      

      Reports pain when swallowing. Neuro: No deficits noted. Cardiovascular: No deficits         

      noted. Respiratory: No deficits noted. GI: No signs and/or symptoms were reported           

      involving the gastrointestinal system. : No signs and/or symptoms were reported           

      regarding the genitourinary system. Derm: No deficits noted. Musculoskeletal: No            

      deficits noted.                                                                             

                                                                                                  

Historical:                                                                                       

- Allergies:                                                                                      

15:14 Dilantin;                                                                               ca1 

- PMHx:                                                                                           

15:14 Asthma; hyperthyroidism; Pneumonia; seasonal allergies;                                 ca1 

- PSHx:                                                                                           

15:14 Thyroidectomy;                                                                          ca1 

                                                                                                  

- Immunization history:: Adult Immunizations up to date, Flu vaccine is up to date.               

- Social history:: Smoking status: Patient denies any tobacco usage or history of.                

                                                                                                  

                                                                                                  

Screening:                                                                                        

15:15 Abuse screen: Denies threats or abuse. Denies injuries from another. Nutritional        bp  

      screening: No deficits noted. Tuberculosis screening: No symptoms or risk factors           

      identified. Fall Risk None identified.                                                      

                                                                                                  

Assessment:                                                                                       

15:15 General: SEE TRIAGE NOTE.                                                               bp  

17:45 Reassessment: PT D/C HOME AMBULATORY, DX WITH ASTHMA EXACERBATION.                      bp  

                                                                                                  

Vital Signs:                                                                                      

15:11  / 95; Pulse 79; Resp 17 S; Temp 97.9(TE); Pulse Ox 99% on R/A; Weight 81.65 kg   ca1 

      (R); Height 5 ft. 9 in. (175.26 cm) (R);                                                    

17:31  / 102; Pulse 59; Resp 16; Pulse Ox 100% ;                                        bp  

15:11 Body Mass Index 26.58 (81.65 kg, 175.26 cm)                                             ca1 

                                                                                                  

ED Course:                                                                                        

14:57 Patient arrived in ED.                                                                  ag5 

15:13 Triage completed.                                                                       ca1 

15:14 Arm band placed on right wrist.                                                         ca1 

15:15 Patient has correct armband on for positive identification. Bed in low position. Call   bp  

      light in reach. Side rails up X2.                                                           

16:21 [facility]                                                        kb  

16:21 Rony Strong MD is Attending Physician.                                              kb  

16:32 Case Agustin, RN is Primary Nurse.                                                    bp  

17:45 No provider procedures requiring assistance completed. Patient did not have IV access   bp  

      during this emergency room visit.                                                           

                                                                                                  

Administered Medications:                                                                         

17:12 Drug: predniSONE 60 mg Route: PO;                                                       em  

17:17 Follow up: Response: No adverse reaction                                                bp  

17:17 Not Given (CANCELLED PER UNIT POLICY): DuoNeb (3:1) (2.5 mg - 0.5 mg) 3 ml Nebulizer    bp  

      once                                                                                        

17:31 Drug: DuoNeb (3:1) (2.5 mg - 0.5 mg) 3 ml Route: Nebulizer;                             bp  

17:32 Follow up: Response: No adverse reaction                                                bp  

                                                                                                  

                                                                                                  

Outcome:                                                                                          

17:37 Discharge ordered by MD.                                                                kb  

17:45 Discharged to home ambulatory.                                                          bp  

17:45 Condition: stable                                                                           

17:45 Discharge instructions given to patient, Instructed on discharge instructions, follow       

      up and referral plans. medication usage, Demonstrated understanding of instructions,        

      follow-up care, medications, Prescriptions given X 1.                                       

18:13 Patient left the ED.                                                                    bp  

                                                                                                  

Signatures:                                                                                       

Wanda Fortune, CLAUDETTEP-C                 CLAUDETTEP-Faizan Hernandez RN RN em Peltier, Brian, RN RN bp Acob, Cheryl, RN RN   [facility]                                                  

Anshul Augustin                                [facility]                                                  

                                                                                                  

**************************************************************************************************

## 2020-05-15 ENCOUNTER — HOSPITAL ENCOUNTER (EMERGENCY)
Dept: HOSPITAL 97 - ER | Age: 40
Discharge: HOME | End: 2020-05-15
Payer: SELF-PAY

## 2020-05-15 VITALS — DIASTOLIC BLOOD PRESSURE: 81 MMHG | SYSTOLIC BLOOD PRESSURE: 131 MMHG

## 2020-05-15 VITALS — OXYGEN SATURATION: 98 % | TEMPERATURE: 98 F

## 2020-05-15 DIAGNOSIS — E03.9: ICD-10-CM

## 2020-05-15 DIAGNOSIS — J45.901: Primary | ICD-10-CM

## 2020-05-15 DIAGNOSIS — Z88.8: ICD-10-CM

## 2020-05-15 PROCEDURE — 94640 AIRWAY INHALATION TREATMENT: CPT

## 2020-05-15 PROCEDURE — 87081 CULTURE SCREEN ONLY: CPT

## 2020-05-15 PROCEDURE — 87804 INFLUENZA ASSAY W/OPTIC: CPT

## 2020-05-15 PROCEDURE — 99284 EMERGENCY DEPT VISIT MOD MDM: CPT

## 2020-05-15 PROCEDURE — 96372 THER/PROPH/DIAG INJ SC/IM: CPT

## 2020-05-15 PROCEDURE — 87070 CULTURE OTHR SPECIMN AEROBIC: CPT

## 2020-05-15 PROCEDURE — 71045 X-RAY EXAM CHEST 1 VIEW: CPT

## 2020-05-15 NOTE — EDPHYS
Physician Documentation                                                                           

 [facility]                                                                 

Name: Ralph Yañez                                                                             

Age: 39 yrs                                                                                       

Sex: Male                                                                                         

: 1980                                                                                   

MRN: [id number]                                                                                   

Arrival Date: 05/15/2020                                                                          

Time: 08:00                                                                                       

Account#: [id number]                                                                            

Bed 6                                                                                             

Private MD:                                                                                       

ED Physician Kedar Yousif                                                                    

HPI:                                                                                              

05/15                                                                                             

08:16 This 39 yrs old  Male presents to ER via Ambulatory with complaints of BODY    jmm 

      ACHES, Shortness Of Breath.                                                                 

08:16 The patient presents to the emergency department with wheezing, Current therapy:        jmm 

      albuterol inhaler. Onset: The symptoms/episode began/occurred gradually, 1 week(s) ago.     

      Modifying factors: The symptoms are alleviated by inhaler, the symptoms are aggravated      

      by pollen. Associated signs and symptoms: Pertinent negatives: fever. This is a 39 year     

      old male with a history of asthma that presents to the ED with complaints of cough,         

      wheezing beginning approx 1 week ago which he attributes to an asthma exacerbation.         

      Inhalers are transiently effective. Today the patient states symptoms worsened while        

      walking today with heavy items. Denies fever but complains of sweats. .                     

                                                                                                  

Historical:                                                                                       

- Allergies:                                                                                      

08:11 Dilantin;                                                                               jl7 

- Home Meds:                                                                                      

08:11 albuterol sulfate 0.63 mg/3 mL Inhl nebu [Active]; Flonase 50 mcg/actuation Nasal spsn  jl7 

      1 spray 2 times per day [Active]; levothyroxine oral [Active]; Zyrtec 10 mg Oral chew 1     

      tab once daily [Active];                                                                    

- PMHx:                                                                                           

08:11 Asthma; hyperthyroidism; Pneumonia; seasonal allergies;                                 jl7 

- PSHx:                                                                                           

08:11 Thyroidectomy;                                                                          jl7 

                                                                                                  

- Immunization history:: Adult Immunizations unknown.                                             

- Social history:: Smoking status: Patient denies any tobacco usage or history of.                

                                                                                                  

                                                                                                  

ROS:                                                                                              

08:16 Eyes: Negative for injury, pain, redness, and discharge.                                jmm 

08:16 Cardiovascular: Negative for chest pain, palpitations, and edema.                           

08:16 Constitutional: Positive for body aches, Negative for fever.                                

08:16 ENT: Positive for sore throat.                                                              

08:16 Respiratory: Positive for cough, wheezing.                                                  

08:16 Abdomen/GI: Negative for abdominal pain, nausea and vomiting, diarrhea.                     

08:16 All other systems are negative.                                                             

                                                                                                  

Exam:                                                                                             

08:16 Constitutional:  This is a well developed, well nourished patient who is awake, alert,  jmm 

      and in no acute distress. Head/Face:  atraumatic. Eyes:  EOMI, no conjunctival erythema     

      appreciated ENT:  Moist Mucus Membranes Neck:  Trachea midline, Supple Chest/axilla:        

      Normal chest wall appearance and motion.   Cardiovascular:  Regular rate and rhythm.        

      No edema appreciated                                                                        

08:16 Abdomen/GI:  Non distended, soft Back:  Normal ROM Skin:  General appearance color          

      normal MS/ Extremity:  Moves all extremities, no obvious deformities appreciated, no        

      edema noted to the lower extremities  Neuro:  Awake and alert, normal gait Psych:           

      Behavior is normal, Mood is normal, Patient is cooperative and pleasant                     

08:16 Respiratory: the patient does not display signs of respiratory distress,  Respirations:     

      normal, Breath sounds: wheezing: that is moderate, is heard diffusely.                      

                                                                                                  

Vital Signs:                                                                                      

08:08  / 89; Pulse 80; Resp 17; Temp 98; Pulse Ox 98% ; Weight 81.65 kg; Pain 0/10;     jl7 

08:34  / 89; Pulse 83; Resp 19; Pulse Ox 98% ;                                          jl7 

09:49  / 81; Pulse 78; Resp 16; Temp 98; Pulse Ox 98% ;                                 bp  

                                                                                                  

MDM:                                                                                              

08:14 Patient medically screened.                                                             [facility] 

09:40 Data reviewed: vital signs, nurses notes. Counseling: I had a detailed discussion with  [facility] 

      the patient and/or guardian regarding: the historical points, exam findings, and any        

      diagnostic results supporting the discharge/admit diagnosis, lab results, radiology         

      results, the need for outpatient follow up, to return to the emergency department if        

      symptoms worsen or persist or if there are any questions or concerns that arise at          

      home. ED course: Decreased wheezing on reexamination. Patient states feeling much           

      better. Patient is advised to follow up with pcp and is otherwise given strict return       

      precautions. Patient understood and agrees with the plan of care. .                         

                                                                                                  

05/15                                                                                             

08:15 Order name: Flu; Complete Time: 09:16                                                   [facility] 

05/15                                                                                             

08:15 Order name: Strep; Complete Time: 09:05                                                 [facility] 

05/15                                                                                             

08:15 Order name: Chest Single View XRAY; Complete Time: 09:05                                [facility] 

05/15                                                                                             

09:03 Order name: Throat Culture                                                              EDMS

                                                                                                  

Administered Medications:                                                                         

00:20 Drug: Decadron 10 mg Route: IM; Site: right deltoid;                                    [facility] 

10:05 Follow up: Response: No adverse reaction                                                bp  

08:20 Drug: DuoNeb (3:1) (2.5 mg - 0.5 mg) 3 ml Route: Nebulizer;                             jl7 

10:05 Follow up: Response: No adverse reaction                                                bp  

                                                                                                  

                                                                                                  

Disposition:                                                                                      

18:18 Co-signature as Attending Physician, Kedar Yousif MD.                               ma2 

                                                                                                  

Disposition:                                                                                      

05/15/20 09:41 Discharged to Home. Impression: Unspecified asthma with (acute) exacerbation.      

- Condition is Stable.                                                                            

- Discharge Instructions: Asthma, Adult.                                                          

- Prescriptions for Prednisone 20 mg Oral Tablet - take 3 tablet by ORAL route once               

  daily for 5 days; 15 tablet.                                                                    

- Medication Reconciliation Form, Thank You Letter, Antibiotic Education, Prescription            

  Opioid Use, Work release form form.                                                             

- Follow up: Private Physician; When: 2 - 3 days; Reason: Recheck today's complaints,             

  Continuance of care, Re-evaluation by your physician.                                           

                                                                                                  

                                                                                                  

                                                                                                  

Signatures:                                                                                       

Dispatcher [employer]                           EDMS                                                 

Adolfo Stewart PA PA jmm Leal, Jahala RN                        RN   jl7                                                  

Case Agustin RN RN bp Alzahri, Mohammad, MD MD   ma2                                                  

                                                                                                  

Corrections: (The following items were deleted from the chart)                                    

10:06 09:41 05/15/2020 09:41 Discharged to Home. Impression: Unspecified asthma with (acute)  bp  

      exacerbation. Condition is Stable. Forms are Medication Reconciliation Form, Thank You      

      Letter, Antibiotic Education, Prescription Opioid Use. Follow up: Private Physician;        

      When: 2 - 3 days; Reason: Recheck today's complaints, Continuance of care,                  

      Re-evaluation by your physician. cynthia                                                        

                                                                                                  

**************************************************************************************************

## 2020-05-15 NOTE — ER
Nurse's Notes                                                                                     

 [facility]                                                                 

Name: Ralph Yañez                                                                             

Age: 39 yrs                                                                                       

Sex: Male                                                                                         

: 1980                                                                                   

MRN: [id number]                                                                                   

Arrival Date: 05/15/2020                                                                          

Time: 08:00                                                                                       

Account#: [id number]                                                                            

Bed 6                                                                                             

Private MD:                                                                                       

Diagnosis: Unspecified asthma with (acute) exacerbation                                           

                                                                                                  

Presentation:                                                                                     

05/15                                                                                             

08:08 Chief complaint: Patient states: "I've felt winded and get sweaty when I'm just         jl7 

      walking." x 1 week, denies fever. Coronavirus screen: Ebola Screen: No symptoms or          

      risks identified at this time. Onset: The symptoms/episode began/occurred 1 week(s)         

      ago. Anaphylaxis evaluation, no signs or symptoms of anaphylaxis were noted. Initial        

      Sepsis Screen: Does the patient meet any 2 criteria? No. Patient's initial sepsis           

      screen is negative. Does the patient have a suspected source of infection? No.              

      Patient's initial sepsis screen is negative. Risk Assessment: Do you want to hurt           

      yourself or someone else? Patient reports no desire to harm self or others. Onset of        

      symptoms was May 08, 2020. Care prior to arrival: None.                                     

08:08 Method Of Arrival: Ambulatory                                                           jl7 

08:08 Acuity: JACEY 3                                                                           jl7 

                                                                                                  

Triage Assessment:                                                                                

08:11 General: Appears in no apparent distress. uncomfortable, Behavior is calm, cooperative, jl7 

      appropriate for age. Pain: Denies pain. Neuro: Level of Consciousness is awake, alert,      

      obeys commands, Oriented to person, place, time, situation. Cardiovascular: Denies          

      chest pain, Heart tones S1 S2 present. Respiratory: Reports shortness of breath on          

      exertion Airway is patent Respiratory effort is even, unlabored, Respiratory pattern is     

      regular, symmetrical, Breath sounds with wheezes bilaterally. the patient has mild          

      shortness of breath. Derm: Skin is pink, warm \T\ dry.                                      

                                                                                                  

Historical:                                                                                       

- Allergies:                                                                                      

08:11 Dilantin;                                                                               jl7 

- Home Meds:                                                                                      

08:11 albuterol sulfate 0.63 mg/3 mL Inhl nebu [Active]; Flonase 50 mcg/actuation Nasal spsn  jl7 

      1 spray 2 times per day [Active]; levothyroxine oral [Active]; Zyrtec 10 mg Oral chew 1     

      tab once daily [Active];                                                                    

- PMHx:                                                                                           

08:11 Asthma; hyperthyroidism; Pneumonia; seasonal allergies;                                 jl7 

- PSHx:                                                                                           

08:11 Thyroidectomy;                                                                          jl7 

                                                                                                  

- Immunization history:: Adult Immunizations unknown.                                             

- Social history:: Smoking status: Patient denies any tobacco usage or history of.                

                                                                                                  

                                                                                                  

Screenin:15 Abuse screen: Denies threats or abuse. Denies injuries from another. Nutritional        jl7 

      screening: No deficits noted. Tuberculosis screening: No symptoms or risk factors           

      identified. Fall Risk None identified.                                                      

                                                                                                  

Assessment:                                                                                       

08:02 General: see triage assessment.                                                         jl7 

08:33 Reassessment: x-ray at bedside.                                                         jl7 

10:03 Reassessment: PT D/C HOME AMBULATORY, DX WITH ASTHMA EXACERBATION.                      bp  

                                                                                                  

Vital Signs:                                                                                      

08:08  / 89; Pulse 80; Resp 17; Temp 98; Pulse Ox 98% ; Weight 81.65 kg; Pain 0/10;     jl7 

08:34  / 89; Pulse 83; Resp 19; Pulse Ox 98% ;                                          jl7 

09:49  / 81; Pulse 78; Resp 16; Temp 98; Pulse Ox 98% ;                                 bp  

                                                                                                  

ED Course:                                                                                        

08:00 Patient arrived in ED.                                                                  bp1 

08:05 Adolfo Stewart PA is PHCP.                                                              jmm 

08:05 Kedar Yousif MD is Attending Physician.                                           jmm 

08:08 Del Wan, RN is Primary Nurse.                                                      jl7 

08:11 Triage completed.                                                                       jl7 

08:11 Arm band placed on right wrist.                                                         jl7 

08:16 Patient has correct armband on for positive identification. Placed in gown. Bed in low  jl7 

      position. Call light in reach. Side rails up X 1. Pulse ox on. NIBP on.                     

08:34 Flu and/or RSV swab sent to lab. Strep swab sent to lab.                                jl7 

08:51 Chest Single View XRAY In Process Unspecified.                                          EDMS

10:03 No provider procedures requiring assistance completed. Patient did not have IV access   bp  

      during this emergency room visit.                                                           

                                                                                                  

Administered Medications:                                                                         

00:20 Drug: Decadron 10 mg Route: IM; Site: right deltoid;                                    jl7 

10:05 Follow up: Response: No adverse reaction                                                bp  

08:20 Drug: DuoNeb (3:1) (2.5 mg - 0.5 mg) 3 ml Route: Nebulizer;                             jl7 

10:05 Follow up: Response: No adverse reaction                                                bp  

                                                                                                  

                                                                                                  

Outcome:                                                                                          

09:41 Discharge ordered by MD.                                                                jmm 

10:03 Discharged to home ambulatory.                                                          bp  

10:03 Condition: stable                                                                           

10:03 Discharge instructions given to patient, Instructed on discharge instructions, follow       

      up and referral plans. medication usage, Demonstrated understanding of instructions,        

      follow-up care, medications, Prescriptions given X 1.                                       

10:06 Patient left the ED.                                                                    bp  

                                                                                                  

Signatures:                                                                                       

Dispatcher [employer]                           EDMS                                                 

Adolfo Stewart PA PA jmm Leal, Jahala RN                        RN   jl7                                                  

Case Agustin RN                      RN   bp                                                   

Naye Mcgill                           bp1                                                  

                                                                                                  

Corrections: (The following items were deleted from the chart)                                    

08:34 08:02  / 89; Pulse 83bpm; Resp 19bpm; Pulse Ox 98%; jonatan [email or web address] 

                                                                                                  

**************************************************************************************************

## 2020-05-15 NOTE — RAD REPORT
EXAM DESCRIPTION:  RAD - Chest Single View - 5/15/2020 8:50 am

 

CLINICAL HISTORY:  cough, shortness of breath

 

COMPARISON:  December 2019

 

TECHNIQUE:  AP portable chest image was obtained 5/15/2020 8:50 am .

 

FINDINGS:  Slightly hazy opacification is present in the left base. This is favored to be more techni
yeni in nature rather than a true lung parenchymal process. A true or significant change from comparis
on is doubtful.

 

Elsewhere no peripheral mass, consolidation or failure finding. Heart and vasculature are normal. No 
measurable pleural effusion and no pneumothorax. No acute bony abnormality seen. No acute aortic find
ings suspected.

 

IMPRESSION:  No acute cardiopulmonary process.

 

No significant change from comparison.

## 2020-05-15 NOTE — XMS REPORT
Patient Summary Document

                             Created on:May 15, 2020



Patient:JERMAINE ZIEGLER

Sex:Male

:1980

External Reference #:[id number]





Demographics







                          Address                   [address]



                                                    [place] [address]

 

                          Home Phone                [phone]

 

                          Mobile Phone              [phone]

 

                          Email Address             [email or web address]

 

                          Preferred Language        English

 

                          Marital Status            Unknown

 

                          Rastafarian Affiliation     Unknown

 

                          Race                      Unknown

 

                          Additional Race(s)        Unavailable



                                                    White

 

                          Ethnic Group              Unknown









Author







                          Organization              [facility]

t

 

                          Address                   [address] [place] [address] [address]



                                                    [place] [address]

 

                          Phone                     [phone]









Support







                Name            Relationship    Address         Phone

 

                Otilio        Spouse          [address]      [phone]



                                                [place] [address] 









Care Team Providers







                    Name                Role                Phone

 

                    Doctor Unassigned,  Name Attending Clinician Unavailable

 

                    Jacky CHAVEZ              Attending Clinician [phone]









Problems

This patient has no known problems.



Allergies, Adverse Reactions, Alerts

This patient has no known allergies or adverse reactions.



Medications

This patient has no known medications.



Procedures

This patient has no known procedures.



Encounters







        Start   End     Encounter Admission Attending Care    Care    Encounter 

Source



        Date/Time Date/Time Type    Type    Clinicians Facility Department ID   

   

 

        2019 Orders          Doctor  JULIO    [id number]

[id number] 



        00:00:00 00:00:00 Only            UnassMAYRA farley   [id number]       

  



                                        [facility] [id number]         



                                                        [phone]         



                                                        009             

 

        2019 Telephone         BATOOL Rico    [id number]

[id number] 



        00:00:00 00:00:00                 Ava Chauhan [id number]         



                                                [place] [id number]         



                                                [place] [phone]         



                                                [address]     [address]             



                                                Building                 







Results

This patient has no known results.

## 2020-08-12 ENCOUNTER — HOSPITAL ENCOUNTER (EMERGENCY)
Dept: HOSPITAL 97 - ER | Age: 40
Discharge: HOME | End: 2020-08-12
Payer: SELF-PAY

## 2020-08-12 VITALS — DIASTOLIC BLOOD PRESSURE: 76 MMHG | OXYGEN SATURATION: 98 % | TEMPERATURE: 98.2 F | SYSTOLIC BLOOD PRESSURE: 121 MMHG

## 2020-08-12 DIAGNOSIS — Z20.828: ICD-10-CM

## 2020-08-12 DIAGNOSIS — J45.909: Primary | ICD-10-CM

## 2020-08-12 DIAGNOSIS — Z88.8: ICD-10-CM

## 2020-08-12 PROCEDURE — 71045 X-RAY EXAM CHEST 1 VIEW: CPT

## 2020-08-12 PROCEDURE — 99284 EMERGENCY DEPT VISIT MOD MDM: CPT

## 2020-08-12 NOTE — EDPHYS
Physician Documentation                                                                           

 [facility]                                                                 

Name: Rlaph Yañez                                                                             

Age: 39 yrs                                                                                       

Sex: Male                                                                                         

: 1980                                                                                   

MRN: [id number]                                                                                   

Arrival Date: 2020                                                                          

Time: 06:38                                                                                       

Account#: [id number]                                                                            

Bed 15                                                                                            

Private MD:                                                                                       

ED Physician Lefty Perla                                                                     

HPI:                                                                                              

                                                                                             

06:46 This 39 yrs old  Male presents to ER via Un[facility] with complaints of Medical kb  

      Clearance.                                                                                  

06:46 The patient or guardian reports cough, that is intermittent, described as mild. Onset:  kb  

      The symptoms/episode began/occurred last week. Severity of symptoms: At their worst the     

      symptoms were moderate, in the emergency department the symptoms have improved,             

      moderately. Modifying factors: The symptoms are alleviated by nothing, the symptoms are     

      aggravated by nothing. Associated signs and symptoms: Pertinent positives: rhinorrhea,      

      Pertinent negatives: chest pain, diarrhea, ear ache, fever, nausea, sore throat,            

      vomiting. The patient has experienced similar episodes in the past, multiple times. The     

      patient has been recently seen by a physician: the patient's primary care provider, 1       

      week(s) ago. Pt reports he went to work this morning and his temp at the gate was 99.9      

      so they sent him to get checked out and make sure he didn't have COVID. Pt reports          

      sinus congestion, drainage down throat and cough. States he went to his PCP last week       

      for chest tightness and was given a steroid injection, was not put on oral steroids. Pt     

      has long history of asthma, I have seen him several times for similar symptoms.             

      Expiratory wheezing noted bilaterally. Pt denies shortness of breath. .                     

06:53 Pt reports he did have exposure to COVID, one of his coworkers was positive. .          kb  

                                                                                                  

Historical:                                                                                       

- Allergies:                                                                                      

06:56 Dilantin;                                                                               vc  

- PMHx:                                                                                           

06:56 Asthma; hyperthyroidism; Pneumonia; seasonal allergies;                                 vc  

- PSHx:                                                                                           

06:56 Thyroidectomy;                                                                          vc  

                                                                                                  

- Immunization history:: Adult Immunizations up to date.                                          

- Social history:: Smoking status: Patient denies any tobacco usage or history of.                

                                                                                                  

                                                                                                  

ROS:                                                                                              

06:46 Constitutional: Negative for fever, chills, and weight loss, Neck: Negative for injury, kb  

      pain, and swelling, Cardiovascular: Negative for chest pain, palpitations, and edema,       

      Abdomen/GI: Negative for abdominal pain, nausea, vomiting, diarrhea, and constipation,      

      Back: Negative for injury and pain, MS/Extremity: Negative for injury and deformity,        

      Skin: Negative for injury, rash, and discoloration, Neuro: Negative for headache,           

      weakness, numbness, tingling, and seizure.                                                  

06:46 ENT: Positive for rhinorrhea, sinus congestion.                                             

06:46 Respiratory: Positive for cough, Negative for dyspnea on exertion, hemoptysis,              

      orthopnea, pleurisy, shortness of breath, sputum production.                                

                                                                                                  

Exam:                                                                                             

06:46 Constitutional:  This is a well developed, well nourished patient who is awake, alert,  kb  

      and in no acute distress. Head/Face:  Normocephalic, atraumatic. Chest/axilla:  Normal      

      chest wall appearance and motion.  Nontender with no deformity.  No lesions are             

      appreciated. Cardiovascular:  Regular rate and rhythm with a normal S1 and S2.  No          

      gallops, murmurs, or rubs.  Normal PMI, no JVD.  No pulse deficits. Abdomen/GI:  Soft,      

      non-tender, with normal bowel sounds.  No distension or tympany.  No guarding or            

      rebound.  No evidence of tenderness throughout. Back:  No spinal tenderness.  No            

      costovertebral tenderness.  Full range of motion. Skin:  Warm, dry with normal turgor.      

      Normal color with no rashes, no lesions, and no evidence of cellulitis. MS/ Extremity:      

      Pulses equal, no cyanosis.  Neurovascular intact.  Full, normal range of motion. Neuro:     

       Awake and alert, GCS 15, oriented to person, place, time, and situation.  Cranial          

      nerves II-XII grossly intact.  Motor strength 5/5 in all extremities.  Sensory grossly      

      intact.  Cerebellar exam normal.  Normal gait.                                              

06:46 Respiratory: the patient does not display signs of respiratory distress,  Respirations:     

      normal, Breath sounds: wheezing: expiratory that is moderate, is heard diffusely.           

                                                                                                  

Vital Signs:                                                                                      

06:50  / 76; Pulse 80; Resp 22; Temp 98.2; Pulse Ox 98% on R/A; Weight 90.72 kg; Height vc  

      5 ft. 8 in. (172.72 cm); Pain 0/10;                                                         

06:50 Body Mass Index 30.41 (90.72 kg, 172.72 cm)                                             vc  

                                                                                                  

MDM:                                                                                              

06:41 Patient medically screened.                                                             kb  

06:50 Data reviewed: vital signs, nurses notes.                                               kb  

06:54 Data interpreted: Pulse oximetry: on room air is 98 %. Interpretation: normal.          kb  

06:54 ED course: Pt has a nebulizer and medications at home. States he is not short of breath kb  

      and does not have any tightness. O2 sat 98% on room air, no respiratory distress. Will      

      prescribe oral steroids and pt will use neb when he gets home. .                            

07:05 Counseling: I had a detailed discussion with the patient and/or guardian regarding: the kb  

      historical points, exam findings, and any diagnostic results supporting the                 

      discharge/admit diagnosis, radiology results, the need for outpatient follow up, a          

      family practitioner, to return to the emergency department if symptoms worsen or            

      persist or if there are any questions or concerns that arise at home.                       

                                                                                                  

                                                                                             

06:53 Order name: COVID-19                                                                    kb  

                                                                                             

06:45 Order name: Chest Single View XRAY                                                      kb  

                                                                                                  

Administered Medications:                                                                         

07:05 Drug: Decadron 6 mg Route: PO;                                                          vc  

07:33 Follow up: Response: No adverse reaction                                                [id number]

                                                                                                  

                                                                                                  

Disposition:                                                                                      

07:52 Co-signature as Attending Physician, Lefty Perla MD I agree with the assessment and tw4 

      plan of care.                                                                               

                                                                                                  

Disposition:                                                                                      

20 07:06 Discharged to Home. Impression: Asthma, Cough.                                     

- Condition is Stable.                                                                            

- Discharge Instructions: Asthma, Adult, Easy-to-Read, Cough, Adult, Easy-to-Read.                

- Prescriptions for dexamethasone 6 mg Oral tablet - take 1 tablet by ORAL route once             

  daily for 7 days; 7 tablet.                                                                     

- Work release form, Medication Reconciliation Form, Thank You Letter, Antibiotic                 

  Education, Prescription Opioid Use form.                                                        

- Follow up: Emergency Department; When: As needed; Reason: Worsening of condition.               

  Follow up: Private Physician; When: 2 - 3 days; Reason: Recheck today's complaints,             

  Continuance of care, Re-evaluation by your physician.                                           

                                                                                                  

                                                                                                  

                                                                                                  

Signatures:                                                                                       

Dispatcher [employer]                           EDMS                                                 

Wanda Fortune, Lefty Murphy MD MD   tw4                                                  

Fanny Will RN                    RN   Felicia Alaniz RN                     RN   [id number]                                                 

                                                                                                  

Corrections: (The following items were deleted from the chart)                                    

06:53 06:46 Pt reports he went to work this morning and his temp at the gate was 99.9 so they kb  

      sent him to get checked out and make sure he didn't have COVID. Pt reports sinus            

      congestion and drainage, cough and tightness in his lungs last week. States he went to      

      his PCP last week and was given a steroid injection to help with the tightness, was not     

      put on oral steroids. Pt has long history of asthma, I have seen him several times for      

      similar symptoms. Expiratory wheezing noted bilaterally. Pt denies shortness of breath.     

      . kb                                                                                        

06:56 06:54 ED course: Pt has a nebulizer and medications at home. States he is not short of  kb  

      breath and does not have any tightness. Will prescribe oral steroids and pt will use        

      neb when he gets home. . kb                                                                 

07:03 06:46 Pt reports he went to work this morning and his temp at the gate was 99.9 so they kb  

      sent him to get checked out and make sure he didn't have COVID. Pt reports sinus            

      congestion and drainage, cough and tightness in his lungs last week. States he went to      

      his PCP last week and was given a steroid injection to help with the tightness, was not     

      put on oral steroids. Pt has long history of asthma, I have seen him several times for      

      similar symptoms. Expiratory wheezing noted bilaterally. Pt denies shortness of breath.     

      . kb                                                                                        

07:34 07:06 2020 07:06 Discharged to Home. Impression: Asthma; Cough. Condition is      [id number]

      Stable. Forms are Medication Reconciliation Form, Thank You Letter, Antibiotic              

      Education, Prescription Opioid Use. Follow up: Emergency Department; When: As needed;       

      Reason: Worsening of condition. Follow up: Private Physician; When: 2 - 3 days; Reason:     

      Recheck today's complaints, Continuance of care, Re-evaluation by your physician. kb        

                                                                                                  

**************************************************************************************************

## 2020-08-12 NOTE — ER
Nurse's Notes                                                                                     

 [facility]                                                                 

Name: Ralph Yañez                                                                             

Age: 39 yrs                                                                                       

Sex: Male                                                                                         

: 1980                                                                                   

MRN: [id number]                                                                                   

Arrival Date: 2020                                                                          

Time: 06:38                                                                                       

Account#: [id number]                                                                            

Bed 15                                                                                            

Private MD:                                                                                       

Diagnosis: Asthma;Cough                                                                           

                                                                                                  

Presentation:                                                                                     

                                                                                             

06:50 Chief complaint: Patient states: "WHEN THEY TOOK MY TEMPERATURE AT WORK THEY TOLD ME IT vc  

      WAS 99.9 AND I HAD TO GET MEDICAL CLEARANCE TO COME BACK, ONE OF MY BOSSES TESTED           

      POSITIVE FOR THAT VIRUS LAST WEEK.". Coronavirus screen: Client denies travel out of        

      the [place] in the last 14 days. COUGH FROM DRAINAGE IN THE BACK OF THROAT, "I ALWAYS HAVE     

      THIS". KNOWN CONTACT OF COVID POSITIVE. Client presents with at least one sign or           

      symptom that may indicate coronavirus-19. Standard/surgical mask placed on the client.      

      Ebola Screen: No symptoms or risks identified at this time. Initial Sepsis Screen: Does     

      the patient meet any 2 criteria? RR > 20 per min. No. Patient's initial sepsis screen       

      is negative. Does the patient have a suspected source of infection? No. Patient's           

      initial sepsis screen is negative. Risk Assessment: Do you want to hurt yourself or         

      someone else? Patient reports no desire to harm self or others. Onset of symptoms was       

      2020.                                                                            

06:50 Method Of Arrival: Ambulatory                                                           vc  

06:50 Acuity: JACEY 3                                                                           vc  

                                                                                                  

Triage Assessment:                                                                                

06:56 General: Appears in no apparent distress. comfortable, Behavior is calm, cooperative,   vc  

      appropriate for age. Pain: Denies pain.                                                     

                                                                                                  

Historical:                                                                                       

- Allergies:                                                                                      

06:56 Dilantin;                                                                               vc  

- PMHx:                                                                                           

06:56 Asthma; hyperthyroidism; Pneumonia; seasonal allergies;                                 vc  

- PSHx:                                                                                           

06:56 Thyroidectomy;                                                                          vc  

                                                                                                  

- Immunization history:: Adult Immunizations up to date.                                          

- Social history:: Smoking status: Patient denies any tobacco usage or history of.                

                                                                                                  

                                                                                                  

Screenin:56 Abuse screen: Denies threats or abuse. Nutritional screening: No deficits noted.        vc  

      Tuberculosis screening: No symptoms or risk factors identified. Fall Risk None              

      identified.                                                                                 

                                                                                                  

Assessment:                                                                                       

06:57 General: Appears in no apparent distress. comfortable, Behavior is calm, cooperative,   vc  

      appropriate for age. Pain: Denies pain. Neuro: Level of Consciousness is awake, obeys       

      commands, Oriented to person, place, time, situation. Cardiovascular: Capillary refill      

      < 3 seconds Patient's skin is warm and dry. Cardiovascular: Denies nausea, shortness of     

      breath. Respiratory: Reports cough that is productive, Airway is patent Respiratory         

      effort is even, unlabored, Respiratory pattern is tachypnea Breath sounds with wheezes      

      bilaterally. Denies shortness of breath at rest. GI: No signs and/or symptoms were          

      reported involving the gastrointestinal system. : No signs and/or symptoms were           

      reported regarding the genitourinary system. EENT: Reports nasal discharge DRAINS DOWN      

      THE BACK OF THROAT. Derm: Skin is intact, is healthy with good turgor, Skin temperature     

      is warm. Musculoskeletal: Circulation, motion, and sensation intact. Range of motion:       

      intact in all extremities.                                                                  

                                                                                                  

Vital Signs:                                                                                      

06:50  / 76; Pulse 80; Resp 22; Temp 98.2; Pulse Ox 98% on R/A; Weight 90.72 kg; Height vc  

      5 ft. 8 in. (172.72 cm); Pain 0/10;                                                         

06:50 Body Mass Index 30.41 (90.72 kg, 172.72 cm)                                             vc  

                                                                                                  

ED Course:                                                                                        

06:38 Patient arrived in ED.                                                                  cl3 

06:41 [facility]                                                        kb  

06:41 Lefty Perla MD is Attending Physician.                                            kb  

06:50 Fanny Will, RN is Primary Nurse.                                                  vc  

06:55 Triage completed.                                                                       vc  

06:57 Arm band placed on.                                                                     vc  

06:57 Patient has correct armband on for positive identification. Bed in low position. Pulse  vc  

      ox on. NIBP on.                                                                             

07:05 Chest Single View XRAY In Process Unspecified.                                          EDMS

07:34 No provider procedures requiring assistance completed. Patient did not have IV access   jr10

      during this emergency room visit.                                                           

                                                                                                  

Administered Medications:                                                                         

07:05 Drug: Decadron 6 mg Route: PO;                                                          vc  

07:33 Follow up: Response: No adverse reaction                                                jr10

                                                                                                  

                                                                                                  

Outcome:                                                                                          

07:06 Discharge ordered by MD.                                                                kb  

07:33 Discharged to home ambulatory.                                                          jr10

07:33 Condition: good                                                                             

07:33 Discharge instructions given to patient, Instructed on discharge instructions, follow       

      up and referral plans. Demonstrated understanding of instructions, follow-up care,          

      medications, Prescriptions given X 1.                                                       

07:34 Patient left the ED.                                                                    jr10

                                                                                                  

Addendum:                                                                                         

08/15/2020                                                                                        

     14:37 Addendum: COVID-19 Result: Negative result given to RN to notify pt. Notified pt of     s
s

           negative COVID 19 swab results. Pt advised that even with a negative test result they  

           should remain in isolation until symptom free for 3 days without medication. Pt also   

           advised to return to the ED for worsening symptoms.                                    

                                                                                                  

Signatures:                                                                                       

Dispatcher [employer]                           EDMS                                                 

Wanda Fortune, FILOMENA MALIK-Yessy Cheney RN                      RN   ss                                                   

Salomón Dooley                                cl3                                                  

Fanny Will RN                    RN   vc                                                   

Felicia Garcia RN                     RN   jr10                                                 

                                                                                                  

**************************************************************************************************

## 2020-08-12 NOTE — XMS REPORT
Continuity of Care Document

                           Created on:2020



Patient:JERMAINE ZIEGLER

Sex:Male

:1980

External Reference #:[id number]





Demographics







                          Address                   [address]



                                                    [place] [address]

 

                          Home Phone                [phone]

 

                          Mobile Phone              [phone]

 

                          Email Address             [email or web address]

 

                          Preferred Language        English

 

                          Marital Status            Unknown

 

                          Judaism Affiliation     Unknown

 

                          Race                      Unknown

 

                          Additional Race(s)        White



                                                    Unavailable

 

                          Ethnic Group              Unknown









Author







                          Organization              [facility]

t

 

                          Address                   [address] [place] [address] [address]



                                                    [place] [address]

 

                          Phone                     [phone]









Support







                Name            Relationship    Address         Phone

 

                Otilio        Spouse          [address]      [phone]



                                                [place] [address] 









Care Team Providers







                    Name                Role                Phone

 

                    Doctor Unassigned,  Name Attending Clinician Unavailable

 

                    Jacky CHAVEZ              Attending Clinician [phone]









Problems

This patient has no known problems.



Allergies, Adverse Reactions, Alerts

This patient has no known allergies or adverse reactions.



Medications

This patient has no known medications.



Procedures

This patient has no known procedures.



Encounters







        Start   End     Encounter Admission Attending Care    Care    Encounter 

Source



        Date/Time Date/Time Type    Type    Clinicians Facility Department ID   

   

 

        2019 Orders          Doctor  JULIO    [id number]

53 



        00:00:00 00:00:00 Only            UnassMAYRA farley   [id number]       

  



                                        [facility] [id number]         



                                                        [phone]         



                                                        009             

 

        2019 Telephone         BATOOL Rico    [phone] [id number]

[id number] 



        00:00:00 00:00:00                 Ava Chauhan [id number]         



                                                [place] [id number]         



                                                [place] [phone]         



                                                [address]     [address]             



                                                Building                 







Results

This patient has no known results.

## 2020-08-12 NOTE — RAD REPORT
EXAM DESCRIPTION:  RAD - Chest Single View - 8/12/2020 7:04 am

 

CLINICAL HISTORY:  COUGH, positive COVID test

 

COMPARISON:  Portable May 15

 

TECHNIQUE:  AP portable chest image was obtained 8/12/2020 7:04 am .

 

FINDINGS:  No focal consolidation. No convincing evidence for ground-glass opacification or other sig
nificant infiltrative process. Heart and vasculature are normal. No measurable pleural effusion and n
o pneumothorax. No acute bony abnormality seen. No acute aortic findings suspected.

 

IMPRESSION:  No acute cardiopulmonary process.

 

No significant change from comparison.

 

CT chest imaging is more sensitive for subtle ground-glass opacification that may indicate early COVI
D-19 pneumonia. Follow-up CT imaging can be obtained as warranted.

## 2021-02-27 ENCOUNTER — HOSPITAL ENCOUNTER (EMERGENCY)
Dept: HOSPITAL 97 - ER | Age: 41
Discharge: HOME | End: 2021-02-27
Payer: SELF-PAY

## 2021-02-27 VITALS — TEMPERATURE: 98.1 F

## 2021-02-27 VITALS — DIASTOLIC BLOOD PRESSURE: 75 MMHG | OXYGEN SATURATION: 94 % | SYSTOLIC BLOOD PRESSURE: 121 MMHG

## 2021-02-27 DIAGNOSIS — J45.909: ICD-10-CM

## 2021-02-27 DIAGNOSIS — U07.1: Primary | ICD-10-CM

## 2021-02-27 DIAGNOSIS — Z88.8: ICD-10-CM

## 2021-02-27 PROCEDURE — 99283 EMERGENCY DEPT VISIT LOW MDM: CPT
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
## 2021-02-27 NOTE — XMS REPORT
Continuity of Care Document

                          Created on:2021



Patient:JERMAINE ZIEGLER

Sex:Male

:1980

External Reference #:[id number]





Demographics







                          Address                   [address]



                                                    [place] [address]

 

                          Home Phone                [phone]

 

                          Mobile Phone              [phone]

 

                          Email Address             [email or web address]

 

                          Preferred Language        English

 

                          Marital Status            Unknown

 

                          Mormonism Affiliation     Unknown

 

                          Race                      Unknown

 

                          Additional Race(s)        White



                                                    Unavailable

 

                          Ethnic Group              Unknown









Author







                          Organization              [facility]

t

 

                          Address                   [address] [place] [address] [address]



                                                    [place] [address]

 

                          Phone                     [phone]









Support







                Name            Relationship    Address         Phone

 

                Otilio        Spouse          [address]      [phone]



                                                [place] [address] 









Care Team Providers







                    Name                Role                Phone

 

                    Doctor Unassigned,  Name Attending Clinician Unavailable

 

                    Jacky CHAVEZ              Attending Clinician [phone]









Problems

This patient has no known problems.



Allergies, Adverse Reactions, Alerts

This patient has no known allergies or adverse reactions.



Medications

This patient has no known medications.



Procedures

This patient has no known procedures.



Encounters







        Start   End     Encounter Admission Attending Care    Care    Encounter 

Source



        Date/Time Date/Time Type    Type    Clinicians Facility Department ID   

   

 

        2019 Orders          Doctor  JULIO    [id number]

[id number] 



        00:00:00 00:00:00 Only            UnassMAYRA farley   [id number]       

  



                                        [facility] [id number]         



                                                        [phone]         



                                                        009             

 

        2019 Telephone         BATOOL Rico    [id number]

[id number] 



        00:00:00 00:00:00                 Ava Chauhan [id number]         



                                                [place] [id number]         



                                                [place] [phone]         



                                                [address]     [address]             



                                                Building                 







Results

This patient has no known results.

## 2021-02-27 NOTE — EDPHYS
Physician Documentation                                                                           

 [facility]                                                                 

Name: Ralph Yañez                                                                             

Age: 40 yrs                                                                                       

Sex: Male                                                                                         

: 1980                                                                                   

MRN: [id number]                                                                                   

Arrival Date: 2021                                                                          

Time: 04:17                                                                                       

Account#: [id number]                                                                            

Bed 6                                                                                             

Private MD:                                                                                       

ED Physician Rishi Small                                                                      

HPI:                                                                                              

                                                                                             

04:36 This 40 yrs old  Male presents to ER via Ambulatory with complaints of         ps1 

      Breathing Difficulty, Sweats.                                                               

04:36 Known COVID postiive. Tested positive \R\ 10 days ago. Has a history of asthma. No        ps1

      hypoxia on evaluation but attests to shortness of breath. Thought he would be better by     

      now. Has typical cough and shortness of breath associated with COVID. .                     

                                                                                                  

Historical:                                                                                       

- Allergies:                                                                                      

04:31 Dilantin;                                                                               rr5 

- Home Meds:                                                                                      

04:31 albuterol sulfate 0.63 mg/3 mL Inhl nebu [Active]; Flonase 50 mcg/actuation Nasal spsn  rr5 

      1 spray 2 times per day [Active]; levothyroxine oral [Active]; Zyrtec 10 mg Oral chew 1     

      tab once daily [Active];                                                                    

- PMHx:                                                                                           

04:31 Asthma; hyperthyroidism; Pneumonia; seasonal allergies;                                 rr5 

- PSHx:                                                                                           

04:31 Thyroidectomy;                                                                          rr5 

                                                                                                  

- Immunization history:: Adult Immunizations unknown.                                             

- Social history:: Smoking status: unknown.                                                       

                                                                                                  

                                                                                                  

ROS:                                                                                              

04:36 Eyes: Negative for injury, pain, redness, and discharge, Neck: Negative for injury,     ps1 

      pain, and swelling, Cardiovascular: Negative for chest pain, palpitations, and edema,       

      MS/Extremity: Negative for injury and deformity, Skin: Negative for injury, rash, and       

      discoloration, Neuro: Negative for headache, weakness, numbness, tingling, and seizure.     

04:36 Constitutional: Positive for body aches, fatigue, malaise.                                  

04:36 Respiratory: Positive for cough, shortness of breath.                                       

                                                                                                  

Exam:                                                                                             

04:36 Constitutional:  This is a well developed, well nourished patient who is awake, alert,  ps1 

      and in no acute distress. Head/Face:  Normocephalic, atraumatic. Cardiovascular:            

      Regular rate and rhythm.  No gallops, murmurs, or rubs.  Normal PMI, no JVD.  No pulse      

      deficits. Respiratory:  Lungs have equal breath sounds bilaterally, clear to                

      auscultation and percussion.  No rales, rhonchi or wheezes noted.  No increased work of     

      breathing, no retractions or nasal flaring. Abdomen/GI:  Soft, non-tender, with normal      

      bowel sounds.  No distension or tympany.  No guarding or rebound.  No evidence of           

      tenderness throughout. MS/ Extremity:  Pulses equal, no cyanosis.  Neurovascular            

      intact.  Full, normal range of motion. Neuro:  Awake and alert, GCS 15, oriented to         

      person, place, time, and situation.  Cranial nerves II-XII grossly intact. Sensory          

      grossly intact.                                                                             

04:36 Eyes: proptosis bilateral. .                                                                

                                                                                                  

Vital Signs:                                                                                      

04:28  / 89; Pulse 81; Resp 20; Temp 98.1; Pulse Ox 93% ; Weight 81.65 kg; Height 5 ft. rr5 

      8 in. (172.72 cm); Pain 0/10;                                                               

04:47  / 75; Pulse 80; Resp 19; Pulse Ox 94% ;                                          rr5 

04:28 Body Mass Index 27.37 (81.65 kg, 172.72 cm)                                             rr5 

                                                                                                  

MDM:                                                                                              

04:39 Differential diagnosis: COVID pneumonia, URI, and others. Data reviewed: vital signs,   ps1 

      nurses notes. Counseling: I had a detailed discussion with the patient and/or guardian      

      regarding: the historical points, exam findings, and any diagnostic results supporting      

      the discharge/admit diagnosis, to return to the emergency department if symptoms worsen     

      or persist or if there are any questions or concerns that arise at home.                    

04:40 Patient medically screened.                                                             ps1 

                                                                                                  

Administered Medications:                                                                         

No medications were administered                                                                  

                                                                                                  

                                                                                                  

Disposition:                                                                                      

21 04:40 Discharged to Home. Impression: COVID 19.                                          

- Condition is Stable.                                                                            

- Discharge Instructions: COVID-19.                                                               

                                                                                                  

- Medication Reconciliation Form, Thank You Letter, Antibiotic Education, Prescription            

  Opioid Use form.                                                                                

- Follow up: Private Physician; When: As needed; Reason: Continuance of care. Follow              

  up: Emergency Department; When: As needed; Reason: Trouble breathing, Worsening of              

  condition.                                                                                      

- Problem is an ongoing problem.                                                                  

- Symptoms are unchanged.                                                                         

                                                                                                  

                                                                                                  

                                                                                                  

Signatures:                                                                                       

Rishi Small MD MD   ps1                                                  

Kenneth Sandy RN                      RN   rr5                                                  

                                                                                                  

Corrections: (The following items were deleted from the chart)                                    

04:48 04:40 2021 04:40 Discharged to Home. Impression: COVID 19. Condition is Stable.   rr5 

      Forms are Medication Reconciliation Form, Thank You Letter, Antibiotic Education,           

      Prescription Opioid Use. Follow up: Private Physician; When: As needed; Reason:             

      Continuance of care. Follow up: Emergency Department; When: As needed; Reason: Trouble      

      breathing, Worsening of condition. Problem is an ongoing problem. Symptoms are              

      unchanged. ps1                                                                              

                                                                                                  

**************************************************************************************************

## 2021-11-21 ENCOUNTER — HOSPITAL ENCOUNTER (EMERGENCY)
Dept: HOSPITAL 97 - ER | Age: 41
Discharge: HOME | End: 2021-11-21
Payer: COMMERCIAL

## 2021-11-21 VITALS — SYSTOLIC BLOOD PRESSURE: 123 MMHG | DIASTOLIC BLOOD PRESSURE: 86 MMHG

## 2021-11-21 VITALS — TEMPERATURE: 98.5 F

## 2021-11-21 VITALS — OXYGEN SATURATION: 96 %

## 2021-11-21 DIAGNOSIS — J18.9: Primary | ICD-10-CM

## 2021-11-21 DIAGNOSIS — J45.41: ICD-10-CM

## 2021-11-21 DIAGNOSIS — Z88.8: ICD-10-CM

## 2021-11-21 LAB
ALBUMIN SERPL BCP-MCNC: 3.7 G/DL (ref 3.4–5)
ALP SERPL-CCNC: 73 U/L (ref 45–117)
ALT SERPL W P-5'-P-CCNC: 49 U/L (ref 12–78)
AST SERPL W P-5'-P-CCNC: 26 U/L (ref 15–37)
BUN BLD-MCNC: 17 MG/DL (ref 7–18)
GLUCOSE SERPLBLD-MCNC: 92 MG/DL (ref 74–106)
HCT VFR BLD CALC: 44.8 % (ref 39.6–49)
INR BLD: 1.07
LYMPHOCYTES # SPEC AUTO: 2.2 K/UL (ref 0.7–4.9)
MAGNESIUM SERPL-MCNC: 2.5 MG/DL (ref 1.8–2.4)
MORPHOLOGY BLD-IMP: (no result)
NT-PROBNP SERPL-MCNC: 44 PG/ML (ref ?–125)
PMV BLD: 8.5 FL (ref 7.6–11.3)
POTASSIUM SERPL-SCNC: 3.7 MMOL/L (ref 3.5–5.1)
RBC # BLD: 5.15 M/UL (ref 4.33–5.43)
TROPONIN (EMERG DEPT USE ONLY): < 0.02 NG/ML (ref 0–0.04)

## 2021-11-21 PROCEDURE — 96365 THER/PROPH/DIAG IV INF INIT: CPT

## 2021-11-21 PROCEDURE — 96375 TX/PRO/DX INJ NEW DRUG ADDON: CPT

## 2021-11-21 PROCEDURE — 71045 X-RAY EXAM CHEST 1 VIEW: CPT

## 2021-11-21 PROCEDURE — 85025 COMPLETE CBC W/AUTO DIFF WBC: CPT

## 2021-11-21 PROCEDURE — 84484 ASSAY OF TROPONIN QUANT: CPT

## 2021-11-21 PROCEDURE — 80048 BASIC METABOLIC PNL TOTAL CA: CPT

## 2021-11-21 PROCEDURE — 99285 EMERGENCY DEPT VISIT HI MDM: CPT

## 2021-11-21 PROCEDURE — 87040 BLOOD CULTURE FOR BACTERIA: CPT

## 2021-11-21 PROCEDURE — 94640 AIRWAY INHALATION TREATMENT: CPT

## 2021-11-21 PROCEDURE — 85610 PROTHROMBIN TIME: CPT

## 2021-11-21 PROCEDURE — 83735 ASSAY OF MAGNESIUM: CPT

## 2021-11-21 PROCEDURE — 93005 ELECTROCARDIOGRAM TRACING: CPT

## 2021-11-21 PROCEDURE — 83880 ASSAY OF NATRIURETIC PEPTIDE: CPT

## 2021-11-21 PROCEDURE — 80076 HEPATIC FUNCTION PANEL: CPT

## 2021-11-21 PROCEDURE — 36415 COLL VENOUS BLD VENIPUNCTURE: CPT

## 2021-11-21 PROCEDURE — 87205 SMEAR GRAM STAIN: CPT

## 2021-11-21 NOTE — XMS REPORT
Continuity of Care Document

                          Created on:2021



Patient:JERMAINE ZIEGLER

Sex:Male

:1980

External Reference #:[id number]





Demographics







                          Address                   [address]



                                                    [place] [address]

 

                          Home Phone                [phone]

 

                          Mobile Phone              [phone]

 

                          Email Address             [email or web address]

 

                          Preferred Language        English

 

                          Marital Status            Unknown

 

                          Buddhist Affiliation     Unknown

 

                          Race                      Unknown

 

                          Additional Race(s)        White



                                                    Unavailable

 

                          Ethnic Group              Unknown









Author







                          Organization              [facility]

t

 

                          Address                   [address] [place] [address] [address]



                                                    [place] [address]

 

                          Phone                     [phone]









Support







                Name            Relationship    Address         Phone

 

                Otiloi        Spouse          [address]      [phone]



                                                [place] [address] 









Care Team Providers







                    Name                Role                Phone

 

                    Beni Smith DO   Attending Clinician [phone]

 

                    Dayron MATTSON  E        Attending Clinician [phone]

 

                    Steve               Attending Clinician [phone]

 

                    Migue CHAVEZ           Attending Clinician [phone]

 

                    Chanel CHAVEZ         Attending Clinician [phone]

 

                    Doctor Unassigned,  Name Attending Clinician Unavailable

 

                    RADIOLOGY           Attending Clinician Unavailable

 

                    Jacky CHAVEZ              Attending Clinician [phone]

 

                    Chanel CHAVEZ         Admitting Clinician [phone]









Payers







           Payer Name Policy Type Policy Number Effective Date Expiration Date S

ource







Problems







       Condition Condition Condition Status Onset  Resolution Last   Treating Co

mments 

Source



       Name   Details Category        Date   Date   Treatment Clinician        



                                                 Date                 

 

       Lower  Lower  Disease Active                              [facility]



       respirator respirator               3-01                               it

y of



       y tract y tract               00:00:                             [place]



       infection infection               00                                 Medi

yeni



       due to due to                                                  Branch



       COVID-19 COVID-19                                                  



       virus  virus                                                   

 

       Postsurgic Postsurgic Disease Active                              U

nivers



       al     al                   5-02                               ity of



       hypothyroi hypothyroi               00:00:                             [facility]

[place]



       dism   dism                                                  Medical



                                                                      [facility]

 

       S/P    S/P    Disease Active                              [facility]



       thyroidect thyroidect               4-21                               it

y of



       ramonita    ramonita                  00:00:                             [place]



                                                                    Medical



                                                                      Branch

 

       Hyperthyro Hyperthyro Disease Active 2016                             U

nivers



       idism  idism                1-                               ity of



                                   00:00:                             [place]



                                   00                                 Medical



                                                                      Branch







Allergies, Adverse Reactions, Alerts







       Allergy Allergy Status Severity Reaction(s) Onset  Inactive Treating Comm

ents 

Source



       Name   Type                        Date   Date   Clinician        

 

       PHENYTOI DRUG   Active        Hives                        [facility]



       N SODIUM INGREDI                      7-12                        ity of



       EXTENDED                             00:00:                      [place]



                                          00                          Medical



                                                                      Branch

 

       Phenytoi Propensi Active        Hives                        Univer

s



       n Sodium ty to                       7-12                        ity of



       Extended adverse                      00:00:                      [place]



              reaction                      00                          Medical



              s                                                       Branch







Social History







           Social Habit Start Date Stop Date  Quantity   Comments   Source

 

           Exposure to                       Not sure              [facility]



           SARS-CoV-2                                             [facility]



           (event)                                                Branch

 

           Alcohol intake 2021 Current               [facility]

 of



                      00:00:00   00:00:00   non-drinker of            [place] [facility]

yeni



                                            alcohol               Branch



                                            (finding)             

 

           Tobacco use and 2021 Never used            [facility]

y of



           exposure   00:00:00   00:00:00                         [facility]

 

           Sex Assigned At 1980                       [facility]

y of



           Birth      00:00:00   00:00:00                         [facility]









                Smoking Status  Start Date      Stop Date       Source

 

                Never smoker                                    [facility]







Medications







       Ordered Filled Start  Stop   Current Ordering Indication Dosage Frequency

 Signature

                    Comments            Components          Source



     Medication Medication Date Date Medication? Clinician                (SIG) 

          



     Name Name                                                   

 

     methylPREDN            Yes       [id number]           Take  by         

  [facility]



     ISolone 4      3-09                               mouth           ity of



     mg tablets      00:00:                               SEE-INSTRU           T

[facility]



               00                                 CTIONS.           Medical



                                                  follow           Branch



                                                  package           



                                                  directions           

 

     methylPREDN      -0      Yes       [id number]           Take  by         

  [facility]



     ISolone 4      3-09                               mouth           ity of



     mg tablets      00:00:                               SEE-INSTRU           T

[facility]



               00                                 CTIONS.           Medical



                                                  follow           Branch



                                                  package           



                                                  directions           

 

     methylPREDN      -0      Yes       [id number]           Take  by         

  [facility]



     ISolone 4      3-09                               mouth           ity of



     mg tablets      00:00:                               SEE-INSTRU           T

ex[facility]



               00                                 CTIONS.           Medical



                                                  follow           Branch



                                                  package           



                                                  directions           

 

     methylPREDN      0      Yes       [id number]           Take  by         

  [facility]



     ISolone 4      3-09                               mouth           ity of



     mg tablets      00:00:                               SEE-INSTRU           T

exas



               00                                 CTIONS.           Medical



                                                  follow           Branch



                                                  package           



                                                  directions           

 

     budesonide-            Yes            2{puff}      Inhale 2          

 [facility]



     formoterol      3-08                               Puffs 2           ity of



     (SYMBICORT)      22:37:                               (two)           [place]



     160-4.5      15                                 times           Medical



     mcg/actuati                                         daily.           Branch



     on inhaler                                                        

 

     budesonide-            Yes            2{puff}      Inhale 2          

 [facility]



     formoterol      3-08                               Puffs 2           ity of



     (SYMBICORT)      22:37:                               (two)           [place]



     160-4.5      15                                 times           Medical



     mcg/actuati                                         daily.           Branch



     on inhaler                                                        

 

     budesonide-            Yes            2{puff}      Inhale 2          

 [facility]



     formoterol      3-08                               Puffs 2           ity of



     (SYMBICORT)      22:37:                               (two)           [place]



     160-4.5      15                                 times           Medical



     mcg/actuati                                         daily.           Branch



     on inhaler                                                        

 

     budesonide-            Yes            2{puff}      Inhale 2          

 [facility]



     formoterol      3-08                               Puffs 2           ity of



     (SYMBICORT)      22:37:                               (two)           [place]



     160-4.5      15                                 times           Medical



     mcg/actuati                                         daily.           Branch



     on inhaler                                                        

 

     dexamethaso            Yes            6mg       6 mg, IV           Un

ethan



     ne        3-06                               Piggyback,           ity of



     (DECADRON      15:00:                               DAILY,           [place]



     PHOSPHATE)      00                                 First dose           Med

ical



     6 mg in                                         (after           Branch



     NaCl 0.9%                                         last           



     (NS) 50 mL                                         modificati           



     piggyback                                         on) on Sat           



                                                  3/6/21 at           



                                                  0900,           



                                                  Until           



                                                  Discontinu           



                                                  ed, 50 mL           

 

     sodium            Yes            1{spray      1 Spray,           Univ

ers



     chloride      3-05                     }         Nasal,           ity of



     (OCEAN MIST      17:30:                               PRN,           [place]



     NASAL) 0.65      50                                 Starting           Medi

yeni



     % nasal                                         Fri 3/5/21           Branch



     spray 1                                         at 1130,           



     Spray                                         Until           



                                                  Discontinu           



                                                  ed,            



                                                  Routine,           



                                                  Surgery/Pr           



                                                  ocedure,           



                                                  Fro            



                                                  irritation           



                                                  from High           



                                                  Flow Nasal           



                                                  Cannula           

 

     azithromyci       No             250mg      250 mg,           U

nivers



     n         3-04 03-06                          Oral,           ity of



     (ZITHROMAX)      15:00: 14:00                          DAILY, 3           T

exas



     tablet 250      00   :00                           doses,           Medical



     mg                                           First dose           Branch



                                                  on Thu           



                                                  3/4/21 at           



                                                  0900, Last           



                                                  dose on           



                                                  Sat 3/6/21           



                                                  at 0900,           



                                                  ASAP<br>Re           



                                                  ason for           



                                                  Anti-Infec           



                                                  tive:           



                                                  Empiric           



                                                  Therapy           



                                                  for            



                                                  Suspected           



                                                  Infection<           



                                                  br>Empiric           



                                                  Therapy           



                                                  Site:           



                                                  Respirator           



                                                  y<br>Durat           



                                                  ion of           



                                                  therapy:           



                                                  72 hours           

 

     sennosides            Yes            8.6mg      8.6 mg,           Uni

vers



     (SENOKOT)      303                               Oral, BID,           ity 

of



     tablet 8.6      17:30:                               First dose           T

[facility]as



     mg        00                                 on Wed           Medical



                                                  3/3/21 at           Branch



                                                  1130,           



                                                  Until           



                                                  Discontinu           



                                                  ed,            



                                                  Routine           

 

     Polyethylen      0      Yes            17g       17 g,           Unive

rs



     e Glycol      3-03                               Oral, BID,           ity o

f



     3350      17:30:                               First dose           [place]



     (MIRALAX)      00                                 on Wed           Medical



     powder 17 g                                         3/3/21 at           [facility]



                                                  1130,           



                                                  Until           



                                                  Discontinu           



                                                  ed,            



                                                  Routine           

 

     bisacodyL            Yes            10mg      10 mg,           Univer

s



     (DULCOLAX)      3-03                               Rectal,           ity of



     suppository      17:20:                               QDAILYPRN,           

[place]



     10 mg      14                                 Starting           Medical



                                                  Wed 3/3/21           Branch



                                                  at 1120,           



                                                  Until           



                                                  Discontinu           



                                                  ed,            



                                                  Routine,           



                                                  Constipati           



                                                  on             



                                                  unresolved           



                                                  by oral           



                                                  medication           



                                                  s              

 

     codeine-gua            Yes            10mL      10 mL,           Univ

ers



     ifenesin      3-03                               Oral,           ity of



     (ROBITUSSIN      14:50:                               Q4HPRN,           Matthew

as



     AC)       40                                 Starting           Medic

al



     mg/5 mL                                         Wed 3/3/21           Branch



     solution 10                                         at 0850,           



     mL                                           Until           



                                                  Discontinu           



                                                  ed,            



                                                  Routine,           



                                                  Cough           

 

     ergocalcife            Yes            08010J      50,000           Un

ethan



     rol       3-02                               Units,           ity of



     (vitamin      15:00:                               Oral,           [place]



     d2)       00                                 QWEEKLY,           Medical



     (CALCIFEROL                                         First dose           Br

anch



     ) capsule                                         on Tue           



     50,000                                         3/2/21 at           



     Units                                         0900,           



                                                  Until           



                                                  Discontinu           



                                                  ed,            



                                                  Routine           

 

     levothyroxi            Yes            137ug      137 mcg,           U

nivers



     ne        3-02                               Oral,           ity of



     (SYNTHROID)      12:00:                               QAM-0600,           T

[facility]



     tablet 137      00                                 First dose           Med

ical



     mcg                                          on Tue           Branch



                                                  3/2/21 at           



                                                  0600,           



                                                  Until           



                                                  Discontinu           



                                                  ed             

 

     ascorbic            Yes            500mg      500 mg,           Unive

rs



     acid      3-02                               Oral, BID,           ity of



     (vitamin C)      02:00:                               First dose           

[place]



     (VITAMIN C)      00                                 on Mon           Medica

l



     tablet 500                                         3/1/21 at           [facility]

ch



     mg                                           2000,           



                                                  Until           



                                                  Discontinu           



                                                  ed,            



                                                  Routine           

 

     budesonide-      2021-0      Yes            2{puff}      2 Puff,           

[facility]



     formoteroL      3-02                               Inhalation           ity

 of



     (SYMBICORT)      02:00:                               , BID,           [place]

s



     160-4.5      00                                 First dose           Medica

l



     mcg/actuati                                         on 



     on inhaler                                         3/1/21 at           



     2 Puff                                         2000,           



                                                  Until           



                                                  Discontinu           



                                                  ed,            



                                                  Routine           

 

     enoxaparin      -0      Yes            40mg      40 mg,           Unive

rs



     (LOVENOX)      3-01                               Subcutaneo           ity 

of



     injection      23:00:                               us, DAILY,           Te

[facility]



     40 mg      00                                 First dose           Medical



                                                  on Mon           Branch



                                                  3/1/21 at           



                                                  1700,           



                                                  Until           



                                                  Discontinu           



                                                  ed,            



                                                  Routine           

 

     ondansetron            Yes            4mg       4 mg, Slow           

[facility]



     (ZOFRAN      3-01                               IV Push,           ity of



     (PF))      19:01:                               Q6HPRN,           [place]



     injection 4      50                                 Starting           Medi

yeni



     mg                                           Mon 3/1/21           Branch



                                                  at 1301,           



                                                  Until           



                                                  Discontinu           



                                                  ed,            



                                                  Routine,           



                                                  Nausea and           



                                                  Vomiting           



                                                  (N/V)           

 

     cefTRIAXone            Yes            1000mg      1,000 mg,          

 [facility]



     (ROCEPHIN)      3-01                               IV             ity of



     1,000 mg in      18:45:                               Piggyback,           

[place]



     NaCl 0.9%      00                                 Q24H ABX,           Medic

al



     (NS) 50 mL                                         First dose           Bra

[facility]



     MINI-BAG                                         on Mon           



                                                  3/1/21 at           



                                                  1245,           



                                                  Until           



                                                  Discontinu           



                                                  ed, 50           



                                                  mL<br&gt;R           



                                                  elizabeth for           



                                                  Anti-Infec           



                                                  tive:           



                                                  Empiric           



                                                  Therapy           



                                                  for            



                                                  Suspected           



                                                  Infection<           



                                                  br>Empiric           



                                                  Therapy           



                                                  Site:           



                                                  Respirator           



                                                  [email or web address]<br>[email or web address]           



                                                  ion of           



                                                  therapy:           



                                                  72 hours           

 

     iohexol      0 - No             100mL      100 mL,           Unive

rs



     (OMNIPAQUE      3-01 03-01                          Intravenou           it

y of



     350       18:05: 18:05                          s, ONCE, 1           [place]



     BULK-100      00   :00                           dose, Mon           Medica

l



     mL)                                          3/1/21 at           [facility]



     injection                                         1230,           



     100 mL                                         Routine           

 

     zinc      0      Yes            220mg      220 mg,           [facility]



     sulfate      3-01                               Oral,           ity of



     (ORAZINC)      18:00:                               DAILY,           [place]



     capsule 220      00                                 First dose           Me

dical



     mg                                           on Mon           [facility]



                                                  3/1/21 at           



                                                  1200,           



                                                  Until           



                                                  Discontinu           



                                                  ed,            



                                                  Routine           

 

     thiamine            Yes            100mg      100 mg,           Unive

rs



     (VITAMIN      3                               Oral,           ity of



     B1) tablet      18:00:                               DAILY,           [place]



     100 mg      00                                 First dose           Medical



                                                  on Mon           [facility]



                                                  3/1/21 at           



                                                  1200,           



                                                  Until           



                                                  Discontinu           



                                                  ed,            



                                                  Routine           

 

     azithromyci      2021- No             500mg      500 mg,           U

nivers



     n         3-01 03-03                          Oral,           ity of



     (ZITHROMAX)      17:45: 14:52                          DAILY, 3           [facility]

[place]



     tablet 500      00   :00                           doses,           Medical



     mg                                           First dose           [facility]



                                                  on Mon           



                                                  3/1/21 at           



                                                  1145, Last           



                                                  dose on           



                                                  Wed 3/3/21           



                                                  at 0900,           



                                                  ASAP<br>Re           



                                                  ason for           



                                                  Anti-Infec           



                                                  tive:           



                                                  Empiric           



                                                  Therapy           



                                                  for            



                                                  Suspected           



                                                  Infection<           



                                                  br>Empiric           



                                                  Therapy           



                                                  Site: Skin           



                                                  / Soft           



                                                  tissue<br>           



                                                  Duration           



                                                  of             



                                                  therapy:           



                                                  72 hours           

 

     acetaminoph            Yes            650mg      650 mg,           Un

ethan



     en        3-01                               Oral,           ity of



     (TYLENOL)      17:41:                               Q6HPRN,           [place]



     tablet 650      38                                 Starting           Medic

al



     mg                                           Mon 3/1/21           [facility]



                                                  at 1141,           



                                                  Until           



                                                  Discontinu           



                                                  ed,            



                                                  Routine,           



                                                  Pain           



                                                  (scale           



                                                  1-3)           

 

     ibuprofen      2021- No             800mg      800 mg,           Uni

vers



     (IBU)      3-01 03-01                          Oral,           ity of



     tablet 800      17:00: 15:56                          ONCE, 1           [facility]

as



     mg        00   :00                           dose, Mon           Medical



                                                  3/1/21 at           [facility]



                                                  1100, ASAP           

 

     budesonide-      2019      Yes            2{puff}      Inhale 2          

 [facility]



     formoterol      1-25                               Puffs 2           ity of



     (SYMBICORT)      22:32:                               (two)           [place]



     160-4.5      24                                 times           Medical



     mcg/actuati                                         daily.           [facility]



     on inhaler                                                        

 

     predniSONE      2019      Yes       [id number] 40mg      Take 2           U

nivers



     20 mg      1-25                               tablets by           ity of



     tablet      00:00:                               mouth           [place]



               00                                 daily.           Medical



                                                                 Branch

 

     albuterol      2019      Yes       [id number] 2{puff}      Inhale 2        

   [facility]



     90        1-25                               Puffs           ity of



     mcg/actuati      00:00:                               every 4           [facility]

as



     on inhaler      00                                 (four)           Medical



                                                  hours as           [facility]



                                                  needed for           



                                                  Wheezing           



                                                  or             



                                                  Shortness           



                                                  of Breath.           

 

     azithromyci      2019      Yes       [id number] 250mg      Take 1          

 [facility]



     n         1-25                               tablet by           ity of



     (ZITHROMAX      00:00:                               mouth           [place]



     Z-ELIZABETH) 250      00                                 SEE-INSTRU           Med

ical



     mg tablet                                         CTIONS.           [facility]



                                                  Take 500           



                                                  mg day 1,           



                                                  then 250           



                                                  mg days 2           



                                                  to 5.           

 

     benzonatate      2019      Yes       [id number] 100mg      Take 1          

 [facility]



     100 mg      1-25                               capsule by           ity of



     capsule      00:00:                               mouth 3           [place]



               00                                 (three)           Medical



                                                  times           [facility]



                                                  daily as           



                                                  needed for           



                                                  Cough.           

 

     predniSONE      2019- No        [id number] 40mg      Take 2           

[facility]



     20 mg      -25 03-08                          tablets by           ity of



     tablet      00:00: 00:00                          mouth           [place]



               00   :00                           daily.           Medical



                                                                 Branch

 

     albuterol      2019- No        [id number] 2{puff}      Inhale 2       

    [facility]



     90        1-25 03-08                          Puffs           ity of



     mcg/actuati      00:00: 00:00                          every 4           [place]

[place]



     on inhaler      00   :00                           (four)           Medical



                                                  hours as           [facility]



                                                  needed for           



                                                  Wheezing           



                                                  or             



                                                  Shortness           



                                                  of Breath.           

 

     azithromyci      2019- No        [id number] 250mg      Take 1         

  [facility]



     n         -25 03-08                          tablet by           ity of



     (ZITHROMAX      00:00: 00:00                          mouth           [place]



     Z-ELIZABETH) 250      00   :00                           SEE-INSTRU           Med

ical



     mg tablet                                         CTIONS.           [facility]



                                                  Take 500           



                                                  mg day 1,           



                                                  then 250           



                                                  mg days 2           



                                                  to 5.           

 

     benzonatate      2019- No        [id number] 100mg      Take 1         

  [facility]



     100 mg      1-25 03-08                          capsule by           ity of



     capsule      00:00: 00:00                          mouth 3           [place]



               00   :00                           (three)           Medical



                                                  times           [facility]



                                                  daily as           



                                                  needed for           



                                                  Cough.           

 

     budesonide-            Yes            2{puff}      Inhale 2          

 [facility]



     formoterol      8-07                               Puffs 2           ity of



     (SYMBICORT)      13:22:                               (two)           [place]



     160-4.5      24                                 times           Medical



     mcg/actuati                                         daily.           [facility]



     on inhaler                                                        

 

     budesonide-            Yes            2{puff}      Inhale 2          

 [facility]



     formoterol      8-07                               Puffs 2           ity of



     (SYMBICORT)      13:22:                               (two)           [place]



     160-4.5      24                                 times           Medical



     mcg/actuati                                         daily.           [facility]



     on inhaler                                                        

 

     Levothyroxi            Yes            137ug      Take 137           U

nivers



     ne 137 mcg      8-07                               mcg by           ity of



     Cap       00:00:                               mouth           [place]



               00                                 daily.           Medical



                                                                 Branch

 

     Levothyroxi            Yes            137ug      Take 137           U

nivers



     ne 137 mcg      8-07                               mcg by           ity of



     Cap       00:00:                               mouth           [place]



               00                                 daily.           Medical



                                                                 Branch

 

     Levothyroxi            Yes            137ug      Take 137           U

nivers



     ne 137 mcg      8-07                               mcg by           ity of



     Cap       00:00:                               mouth           [place]



               00                                 daily.           Medical



                                                                 Branch

 

     Levothyroxi            Yes            137ug      Take 137           U

nivers



     ne 137 mcg      8-07                               mcg by           ity of



     Cap       00:00:                               mouth           [place]



               00                                 daily.           Medical



                                                                 Branch

 

     Levothyroxi            Yes            137ug      Take 137           U

nivers



     ne 137 mcg      8-07                               mcg by           ity of



     Cap       00:00:                               mouth           [place]



               00                                 daily.           Medical



                                                                 Branch

 

     Levothyroxi            Yes            137ug      Take 137           U

nivers



     ne 137 mcg      8-07                               mcg by           ity of



     Cap       00:00:                               mouth           [place]



               00                                 daily.           Medical



                                                                 Branch

 

     Levothyroxi            Yes            137ug      Take 137           U

nivers



     ne 137 mcg      8-07                               mcg by           ity of



     Cap       00:00:                               mouth           [place]



               00                                 daily.           Medical



                                                                 Branch

 

     oseltamivir      2017      Yes            75mg      Take 1           Univ

ers



     (TAMIFLU)      2-26                               capsule by           ity 

of



     75 mg      00:00:                               mouth 2           [place]



     capsule      00                                 (two)           Medical



                                                  times           [facility]



                                                  daily.           

 

     oseltamivir      2017      Yes            75mg      Take 1           Univ

ers



     (TAMIFLU)      2-26                               capsule by           ity 

of



     75 mg      00:00:                               mouth 2           [place]



     capsule      00                                 (two)           Medical



                                                  times           [facility]



                                                  daily.           

 

     ALBUTEROL            Yes            2.5mg      Inhale 3           Uni

vers



     2.5 mg /3      5-20                               mL every 6           ity 

of



     mL (0.083      00:00:                               (six)           [place]



     %)        00                                 hours as           Medical



     nebulizer                                         needed for           [facility]

ch



     solution                                         Wheezing           



                                                  or             



                                                  Shortness           



                                                  of Breath.           



                                                  May also           



                                                  nebulize           



                                                  one extra           



                                                  every 6           



                                                  hours.           

 

     ALBUTEROL            Yes            2{puff}      Inhale 2           U

nivers



     90        5-20                               Puffs           ity of



     mcg/actuati      00:00:                               every 4           [facility]

as



     on inhaler      00                                 (four)           Medical



                                                  hours as           [facility]



                                                  needed for           



                                                  Wheezing           



                                                  or             



                                                  Shortness           



                                                  of Breath.           

 

     ALBUTEROL            Yes            2.5mg      Inhale 3           Uni

vers



     2.5 mg /3      5-20                               mL every 6           ity 

of



     mL (0.083      00:00:                               (six)           [place]



     %)        00                                 hours as           Medical



     nebulizer                                         needed for           [facility]

ch



     solution                                         Wheezing           



                                                  or             



                                                  Shortness           



                                                  of Breath.           



                                                  May also           



                                                  nebulize           



                                                  one extra           



                                                  every 6           



                                                  hours.           

 

     ALBUTEROL      2017-0      Yes            2{puff}      Inhale 2           U

nivers



     90        5-20                               Puffs           ity of



     mcg/actuati      00:00:                               every 4           [facility]

as



     on inhaler      00                                 (four)           Medical



                                                  hours as           [facility]



                                                  needed for           



                                                  Wheezing           



                                                  or             



                                                  Shortness           



                                                  of Breath.           

 

     ALBUTEROL      2017-0      Yes            2.5mg      Inhale 3           Uni

vers



     2.5 mg /3      5-20                               mL every 6           ity 

of



     mL (0.083      00:00:                               (six)           [place]



     %)        00                                 hours as           Medical



     nebulizer                                         needed for           [facility]

ch



     solution                                         Wheezing           



                                                  or             



                                                  Shortness           



                                                  of Breath.           



                                                  May also           



                                                  nebulize           



                                                  one extra           



                                                  every 6           



                                                  hours.           

 

     ALBUTEROL      2017-0      Yes            2{puff}      Inhale 2           U

nivers



     90        5-20                               Puffs           ity of



     mcg/actuati      00:00:                               every 4           [facility]

as



     on inhaler      00                                 (four)           Medical



                                                  hours as           [facility]



                                                  needed for           



                                                  Wheezing           



                                                  or             



                                                  Shortness           



                                                  of Breath.           

 

     ZITHROMAX      2017-0      Yes            250mg      Take 1           Unive

rs



     Z-ELIZABETH 250      5-20                               tablet by           ity o

f



     mg dose      00:00:                               mouth           [place]



     pack      00                                 SEE-INSTRU           Medical



                                                  CTIONS.           Branch



                                                  Take 500           



                                                  mg day 1,           



                                                  then 250           



                                                  mg days 2           



                                                  to 5.           

 

     ALBUTEROL      -0      Yes            2.5mg      Inhale 3           Uni

vers



     2.5 mg /3      5-20                               mL every 6           ity 

of



     mL (0.083      00:00:                               (six)           [place]



     %)        00                                 hours as           Medical



     nebulizer                                         needed for           Bran

ch



     solution                                         Wheezing           



                                                  or             



                                                  Shortness           



                                                  of Breath.           



                                                  May also           



                                                  nebulize           



                                                  one extra           



                                                  every 6           



                                                  hours.           

 

     ALBUTEROL      -0      Yes            2{puff}      Inhale 2           U

nivers



     90        5-20                               Puffs           ity of



     mcg/actuati      00:00:                               every 4           [facility]

as



     on inhaler      00                                 (four)           Medical



                                                  hours as           Branch



                                                  needed for           



                                                  Wheezing           



                                                  or             



                                                  Shortness           



                                                  of Breath.           

 

     ZITHROMAX      2017-0      Yes            250mg      Take 1           Unive

rs



     Z-ELIZABETH 250      5-20                               tablet by           ity o

f



     mg dose      00:00:                               mouth           [place]



     pack      00                                 SEE-INSTRU           Medical



                                                  CTIONS.           Branch



                                                  Take 500           



                                                  mg day 1,           



                                                  then 250           



                                                  mg days 2           



                                                  to 5.           

 

     ALBUTEROL      2017-0      Yes            2.5mg      Inhale 3           Uni

vers



     2.5 mg /3      5-20                               mL every 6           ity 

of



     mL (0.083      00:00:                               (six)           [place]



     %)        00                                 hours as           Medical



     nebulizer                                         needed for           Bran

ch



     solution                                         Wheezing           



                                                  or             



                                                  Shortness           



                                                  of Breath.           



                                                  May also           



                                                  nebulize           



                                                  one extra           



                                                  every 6           



                                                  hours.           

 

     ALBUTEROL            Yes            2{puff}      Inhale 2           U

nivers



     90        5-20                               Puffs           ity of



     mcg/actuati      00:00:                               every 4           [facility]

as



     on inhaler      00                                 (four)           Medical



                                                  hours as           Branch



                                                  needed for           



                                                  Wheezing           



                                                  or             



                                                  Shortness           



                                                  of Breath.           

 

     ALBUTEROL            Yes            2.5mg      Inhale 3           Uni

vers



     2.5 mg /3      5-20                               mL every 6           ity 

of



     mL (0.083      00:00:                               (six)           [place]



     %)        00                                 hours as           Medical



     nebulizer                                         needed for           Bran

ch



     solution                                         Wheezing           



                                                  or             



                                                  Shortness           



                                                  of Breath.           



                                                  May also           



                                                  nebulize           



                                                  one extra           



                                                  every 6           



                                                  hours.           

 

     ALBUTEROL            Yes            2{puff}      Inhale 2           U

nivers



     90        5-20                               Puffs           ity of



     mcg/actuati      00:00:                               every 4           [facility]

as



     on inhaler      00                                 (four)           Medical



                                                  hours as           Branch



                                                  needed for           



                                                  Wheezing           



                                                  or             



                                                  Shortness           



                                                  of Breath.           

 

     ALBUTEROL            Yes            2.5mg      Inhale 3           Uni

vers



     2.5 mg /3      5-20                               mL every 6           ity 

of



     mL (0.083      00:00:                               (six)           [place]



     %)        00                                 hours as           Medical



     nebulizer                                         needed for           Bran

ch



     solution                                         Wheezing           



                                                  or             



                                                  Shortness           



                                                  of Breath.           



                                                  May also           



                                                  nebulize           



                                                  one extra           



                                                  every 6           



                                                  hours.           

 

     ALBUTEROL            Yes            2{puff}      Inhale 2           U

nivers



     90        5-20                               Puffs           ity of



     mcg/actuati      00:00:                               every 4           [facility]

as



     on inhaler      00                                 (four)           Medical



                                                  hours as           Branch



                                                  needed for           



                                                  Wheezing           



                                                  or             



                                                  Shortness           



                                                  of Breath.           

 

     FLUTICASONE            Yes                      Inhale           Univ

ers



     /VILANTEROL      4-22                               daily.           ity of



     (BREO      16:25:                                              [place]



     ELLIPTA      10                                                Medical



     INHALE)                                                        Branch

 

     FLUTICASONE            Yes                      Inhale           Univ

ers



     /VILANTEROL      4-22                               daily.           ity of



     (BREO      16:25:                                              [place]



     ELLIPTA      10                                                Medical



     INHALE)                                                        Branch

 

     meclizine            Yes                      TAKE 1           Univer

s



     (ANTIVERT)      8-21                               TABLET BY           ity 

of



     25 mg      00:00:                               MOUTH           [place]



     tablet      00                                 EVERY 8           Medical



                                                  HOURS AS           Branch



                                                  NEEDED           

 

     meclizine      2016-0      Yes                      TAKE 1           Univer

s



     (ANTIVERT)      8-21                               TABLET BY           ity 

of



     25 mg      00:00:                               MOUTH           [place]



     tablet      00                                 EVERY 8           Medical



                                                  HOURS AS           Branch



                                                  NEEDED           







Vital Signs







             Vital Name   Observation Time Observation Value Comments     Source

 

             Systolic blood 2021 21:00:00 107 mm[Hg]                Univer

sity [facility]

 

             Diastolic blood 2021 21:00:00 66 mm[Hg]                 [facility]

 

             Body temperature 2021 21:00:00 36.67 Cathy                 [facility]

 

             Heart rate   2021 10:01:00 64 /min                   [facility]

 

             Respiratory rate 2021 10:01:00 18 /min                   [facility]

 

             Oxygen saturation in 2021 10:01:00 97 /min                   

[facility]



             Arterial blood by                                        [place] [facility]

yeni



             Pulse oximetry                                        [place]

 

             Body height  2021 17:45:00 172.7 cm                  [facility]

 

             Body weight  2021 17:45:00 79.47 kg                  [facility]

 

             BMI          2021 17:45:00 26.64 kg/m2               [facility]







Procedures







                Procedure       Date / Time     Performing Clinician Source



                                Performed                       

 

                BASIC METABOLIC PANEL 2021 11:39:00 Ja Arlen    [facility]

[facility] [place]



                (NA, K, CL, CO2,                                 [facility]



                GLUCOSE, BUN,                                   



                CREATININE, CA)                                 

 

                CBC WITH DIFF   2021 11:39:00 Ja [facility]

 

                BASIC METABOLIC PANEL 2021 10:47:00 [place] Arlen    Univer

[facility] [place]



                (NA, K, CL, CO2,                                 [facility]



                GLUCOSE, BUN,                                   



                CREATININE, CA)                                 

 

                CBC WITH DIFF   2021 10:47:00 [place] [facility]

 

                C-REACTIVE PROTEIN 2021 11:18:00 Marcos Buchanan [facility]

 

                COMP. METABOLIC PANEL 2021 11:18:00 Marcos Buchanan [facility]



                ([id number])                                         Medical Branch

 

                CBC WITH DIFF   2021 [facility]:18:00 [facility]

 

                PROCALCITONIN   2021 11:18:00 Chanel [facility]

 

                POCT GLUCOSE (AUTOMATED) 2021 17:38:00 Marcos Buchanan [facility]

 

                THYROID STIMULATING 2021 09:59:00 Marcos Buchanan [facility]



                HORMONE                                         [facility]

 

                BASIC METABOLIC PANEL 2021 09:59:00 Marcos Buchanan [facility]



                (NA, K, CL, CO2,                                 Medical Branch



                GLUCOSE, BUN,                                   



                CREATININE, CA)                                 

 

                CBC WITH DIFF   2021 09:59:00 Chanel [facility]

 

                URINALYSIS      2021 03:16:00 Migue [facility]

 

                PNEUMOCOCCAL ANTIGEN 2021 03:16:00 Chanel Marcos [facility]

 

                LACTATE DEHYDROGENASE 2021 18:48:00 Chanel [facility]

 

                C-REACTIVE PROTEIN 2021 18:48:00 [facility]

 

                VITAMIN D, 25-OH 2021 18:48:00 Chanel [facility]

 

                PROCALCITONIN   2021 18:48:00 Chanel [facility]

 

                CT CHEST PULMONARY 2021 18:15:58 Chanel Marcos [facility]



                ANGIOGRAM                                       [facility]

 

                HB ECG ROUTINE & RHYTHM 2021 16:14:47 Michael Camarena [facility]



                STRIP                                           [facility]

 

                XR CHEST 1 VW   2021 16:14:07 Michael Camarena [facility]

 

                BLOOD CULTURE SCREEN 2021 16:05:00 Michael Camarena [facility]

 

                COVID-19 (ID NOW RAPID 2021 16:05:00 Michael Camarena [facility]



                TESTING)                                        [facility]

 

                BLOOD CULTURE SCREEN 2021 16:04:00 Michael Camarena [facility]

 

                CREATINE KINASE 2021 16:04:00 Abdullah, [facility]

 

                FERRITIN SERUM  2021 [facility]04[facility]

 

                TROPONIN I      2021 16:04:00 Migue [facility]

 

                HEPATIC FUNCTION PANEL 2021 16:04:00 Michael Camarena [facility]



                ([id number]) (ALB,T.PRO,BILI                                 [facility] 

Branch



                T,BU/BC,ALT,AST,ALK                                 



                PHOS)                                           

 

                BASIC METABOLIC PANEL 2021 16:04:00 Michael Camarena [facility]



                (NA, K, CL, CO2,                                 Medical Branch



                GLUCOSE, BUN,                                   



                CREATININE, CA)                                 

 

                [facility] DIFF   [facility]

 

                GLYCOSYLATED [facility]EMOGLOBIN 2021 [facility]:04[facility]00 [facility]



                (A1C)                                           [facility]

 

                PROTHROMBIN TIME / INR 2021 16:04:00 Migue [facility]

 

                D-DIMER         2021 16:04:00 Chanel [facility]

 

                ACTIVATED PARTIAL 2021 16:04:00 Migue [facility]



                THRMPLAS ROSE                                    [facility]

 

                N-TERMINAL PRO-BNP 2021 16:04:00 Michael Camarena [facility]

 

                NOTICE OF PRIVACY 2021 15:19:40 Doctor Unassigned, No [facility]



                PRACTICES                       Name            [facility]

 

                CONSENT/REFUSAL FOR 2021 15:19:24 Doctor Unassigned, No [facility]



                DIAGNOSIS AND TREATMENT                 Name            [facility]

 

                CONSENT/REFUSAL FOR 2019 20:22:33 Doctor Unassigned, No [facility]



                DIAGNOSIS AND TREATMENT                 Name            [facility]

 

                ASSIGNMENT OF BENEFITS 2019 20:22:19 Doctor Unassigned, No

 [facility]







Encounters







        Start   End     Encounter Admission Attending Care    Care    Encounter 

Source



        Date/Time Date/Time Type    Type    Clinicians Facility Department ID   

   

 

        2021-10-31         Emergency                 [facility]    [id number] 

Univers



        02:01:55                                                         ity [facility]

 

        2021 Patient         Luis  [facility]    [id number]

[id number] Univers



        00:00:00 00:00:00 Outreach         Bernardo  PRIMARY [id number]         i

ty of



                                        Beni  CARE    [id number]         [place]

[place]



                                                [place] [phone]         Me

dical



                                                        388             Branch

 

        2021-03-10 2021-03-10 Patient         Darya Padgett  [id number]

[id number] Univers



        00:00:00 00:00:00 Outreach         MILAGROS De Jesusy   [id number]         i

ty of



                                                [place]   [id number]         [place]

[place]



                                                        [phone]         Medi

yeni



                                                        403             Branch

 

        2021 Transition         Arelis Wilson  [id number]

[id number] Univers



        00:00:00 00:00:00 of Care         Katie Hawkins   [id number]        

 ity of



                                                [place]   [id number]         [place]

[place]



                                                        [phone]         Medi

yeni



                                                        403             Branch

 

        2021 Hospital         Michael Camarena [facility]    1.2.840.1

[id number] 

Univers



        09:36:00 16:20:00 Encounter         Marcos Buchanan [id number]

         ity of



                                                [place] [id number]         [place]

[place]



                                                [place]  [phone]         Medi

yeni



                                                        080             Branch

 

        2021 Orders          Doctor KIM    [id number]

[id number] Univers



        00:00:00 00:00:00 Only            Unassigned, MAYRA   [id number]       

  ity of



                                        [place] [id number]         [place]

as



                                                        [phone]         Medi

yeni



                                                        009             Branch

 

        2020 Outpatient                 [facility]    [id number]

-20 Univers



        13:30:00 13:30:00                                         [id number]  ity of



                                                                        [facility]

 

        2020 Outpatient R       RADIOLOGY [facility]    [id number]

[id number] Univers



        00:00:00 00:00:00                                                 ity of



                                                                        [facility]

 

        2019 Orders          Doctor  JULIO    [id number]

[id number] 



        00:00:00 00:00:00 Only            Unassigned, MAYRA   [id number]       

  



                                        [facility] [id number]         



                                                        [phone]         



                                                        009             

 

        2019 Orders          Doctor  JULIO    [id number]

53 Univers



        00:00:00 00:00:00 Only            Unassigned, MAYRA   [id number]       

  ity of



                                        [facility] [id number]         [place]

as



                                                        [phone]         Medi

yeni



                                                        009             Branch

 

        2019 Telephone         Rico,    [facility]    [id number]

[id number] 



        00:00:00 00:00:00                 Ava Chauhan [id number]         



                                                [place] [id number]         



                                                [employer] [phone]         



                                                nal     [address]             



                                                Building                 

 

        2019 Telephone         Rico,    [facility]    [id number]

[id number] Univers



        00:00:00 00:00:00                 Ava [place] [id number]         i

ty of



                                                [place] [id number]         [place]

s



                                                [place] [phone]         Me

dical



                                                [facility]     [address]             [facility]                 







Results







           Test Description Test Time  Test Comments Results    Result Comments 

Source









                    BASIC METABOLIC PANEL (NA, K, CL, CO2, GLUCOSE, BUN, 2021 12:18:34 



                    CREATININE, CA)                         









                      Test Item  Value      Reference Range Interpretation Comme

nts









             NA (test code = 0023217643) 137 mmol/L   135-145                   

 

             K (test code = 7830531145) 4.0 mmol/L   3.5-5.0                   

 

             CL (test code = 5821161453) 102 mmol/L                       

 

             CO2 TOTAL (test code = 3791906839) 30 mmol/L    23-31              

       

 

             AGAP (test code = 4847557697)              2-16                    

  

 

             BUN (test code = 5164694075) 22 mg/dL     7-23                     

 

 

             GLUCOSE (test code = 2811526725) 102 mg/dL                   

     

 

             CREATININE (test code = 0.69 mg/dL   0.60-1.25                 



             6178223229)                                         

 

             CALCIUM (test code = 4108209453) 8.5 mg/dL    8.6-10.6     L       

     

 

             eGFR Calculation (Non-              mL/min/1.73m2           

   



             American) (test code = 2097426660)                                 

       

 

             eGFR Calculation (African              mL/min/1.73m2              



             American) (test code = 6355605223)                                 

       

 

             MISA (test code = MISA) [employer]                    

       



                          Filtration Rate (GFR) and Staging                     

      



                          of Kidney Disease*                           



                          +-----------------------+--------                     

      



                          -------------+-------------------                     

      



                          ------+| GFR (mL/min/1.73 m2) ?|                      

     



                          With Kidney Damage ?| ?Without                        

   



                          Kidney                                 



                          Damage+-----------------------+--                     

      



                          -------------------+-------------                     

      



                          ------------+| ?>90 ? ? ? ? ? ? ?                     

      



                          ? ?| ?Stage one ? ? ? ? ?| ?                          

 



                          Normal ? ? ? ? ? ? ?                           



                          ?+-----------------------+-------                     

      



                          --------------+------------------                     

      



                          -------+| ?60-89 ? ? ? ? ? ? ? ?|                     

      



                          ?Stage two ? ? ? ? ?| ? Decreased                     

      



                          GFR ? ? ? ?                            



                          +-----------------------+--------                     

      



                          -------------+-------------------                     

      



                          ------+| ?30-59 ? ? ? ? ? ? ? ?|                      

     



                          ?Stage three ? ? ? ?| ? Stage                         

  



                          three ? ? ? ? ?                           



                          +-----------------------+--------                     

      



                          -------------+-------------------                     

      



                          ------+| ?15-29 ? ? ? ? ? ? ? ?|                      

     



                          ?Stage four ? ? ? ? | ? Stage                         

  



                          four ? ? ? ? ?                           



                          ?+-----------------------+-------                     

      



                          --------------+------------------                     

      



                          -------+| ?<15 (or dialysis) ? ?|                     

      



                          ?Stage five ? ? ? ? | ? Stage                         

  



                          five ? ? ? ? ?                           



                          ?+-----------------------+-------                     

      



                          --------------+------------------                     

      



                          -------+ *Each stage assumes the                      

     



                          associated GFR level has been in                      

     



                          effect for at least three months.                     

      



                          ?Stages 1 to 5, with or without                       

    



                          kidney disease, indicate chronic                      

     



                          kidney disease. Notes:                           



                          Determination of stages one and                       

    



                          two (with eGFR >59mL/min/1.73 m2)                     

      



                          requires estimation of kidney                         

  



                          damage for at least three months                      

     



                          as defined by structural or                           



                          functional abnormalities of the                       

    



                          kidney, manifested by                           



                          either:Pathological abnormalities                     

      



                          or Markers of kidney damage                           



                          (including abnormalities in the                       

    



                          composition of the blood or urine                     

      



                          or abnormalities in imaging                           



                          tests).                                

 

             Lab Interpretation (test code = Abnormal                           

    



             14348-7)                                            



[facility] WITH [id number] 12:08:31





             Test Item    Value        Reference Range Interpretation Comments

 

             WBC (test code =              See_Comment  H             [Automated



             8822-2)                                             message] The



                                                                 system which



                                                                 generated this



                                                                 result transmit

rodrick



                                                                 reference range

:



                                                                 4.20 - 10.70



                                                                 10*3/?L. The



                                                                 reference range



                                                                 was not used to



                                                                 interpret this



                                                                 result as



                                                                 normal/abnormal

.

 

             RBC (test code =              See_Comment                [Automated



             569-8)                                              message] The



                                                                 system which



                                                                 generated this



                                                                 result transmit

rodrick



                                                                 reference range

:



                                                                 4.26 - 5.52



                                                                 10*6/?L. The



                                                                 reference range



                                                                 was not used to



                                                                 interpret this



                                                                 result as



                                                                 normal/abnormal

.

 

             HGB (test code = 14.3 g/dL    12.2-16.4                 



             718-7)                                              

 

             HCT (test code = 40.6 %       38.4-49.3                 



             4544-3)                                             

 

             MCV (test code = 83.4 fL      81.7-95.6                 



             787-2)                                              

 

             MCH (test code = 29.4 pg      26.1-32.7                 



             785-6)                                              

 

             MCHC (test code = 35.2 g/dL    31.2-35.0    H            



             786-4)                                              

 

             RDW-SD (test code = 39.8 fL      38.5-51.6                 



             42403-5)                                            

 

             RDW-CV (test code = 13.2 %       12.1-15.4                 



             788-0)                                              

 

             PLT (test code =              See_Comment  H             [Automated



             777-3)                                              message] The



                                                                 system which



                                                                 generated this



                                                                 result transmit

rodrick



                                                                 reference range

:



                                                                 150 - 328 10*3/

?L.



                                                                 The reference



                                                                 range was not u

sed



                                                                 to interpret th

is



                                                                 result as



                                                                 normal/abnormal

.

 

             MPV (test code = 9.9 fL       9.8-13.0                  



             32842-1)                                            

 

             NRBC/100 WBC (test              See_Comment                [Automat

ed



             code = [id number])                                        message] 

The



                                                                 system which



                                                                 generated this



                                                                 result transmit

rodrick



                                                                 reference range

:



                                                                 0.0 - 10.0 /100



                                                                 WBCs. The



                                                                 reference range



                                                                 was not used to



                                                                 interpret this



                                                                 result as



                                                                 normal/abnormal

.

 

             NRBC x10^3 (test code <0.01        See_Comment                [Auto

mated



             = [id number])                                        message] The



                                                                 system which



                                                                 generated this



                                                                 result transmit

rodrick



                                                                 reference range

:



                                                                 10*3/?L. The



                                                                 reference range



                                                                 was not used to



                                                                 interpret this



                                                                 result as



                                                                 normal/abnormal

.

 

             GRAN MAT (NEUT) % 82.9 %                                 



             (test code = 770-8)                                        

 

             IMM GRAN % (test code 3.60 %                                 



             = [id number])                                        

 

             LYMPH % (test code = 8.3 %                                  



             736-9)                                              

 

             MONO % (test code = 4.4 %                                  



             5905-5)                                             

 

             EOS % (test code = 0.5 %                                  



             713-8)                                              

 

             BASO % (test code = 0.3 %                                  



             706-2)                                              

 

             GRAN MAT x10^3(ANC) 21.94 10*3/uL 1.99-6.95    H            



             (test code =                                        



             3087641960)                                         

 

             IMM GRAN x10^3 (test 0.95 10*3/uL 0.00-0.06    H            



             code = [id number])                                        

 

             LYMPH x10^3 (test code 2.21 10*3/uL 1.09-3.23                 



             = 731-0)                                            

 

             MONO x10^3 (test code 1.16 10*3/uL 0.36-1.02    H            



             = 742-7)                                            

 

             EOS x10^3 (test code = 0.13 10*3/uL 0.06-0.53                 



             711-2)                                              

 

             BASO x10^3 (test code 0.09 10*3/uL 0.01-0.09                 



             = 704-7)                                            

 

             Lab Interpretation Abnormal                               



             (test code = 74595-9)                                        



[facility] WITH [id number] 12:11:01





             Test Item    Value        Reference Range Interpretation Comments

 

             WBC (test code =              See_Comment  H             [Automated



             4724-2)                                             message] The



                                                                 system which



                                                                 generated this



                                                                 result transmit

rodrick



                                                                 reference range

:



                                                                 4.20 - 10.70



                                                                 10*3/?L. The



                                                                 reference range



                                                                 was not used to



                                                                 interpret this



                                                                 result as



                                                                 normal/abnormal

.

 

             RBC (test code =              See_Comment                [Automated



             189-8)                                              message] The



                                                                 system which



                                                                 generated this



                                                                 result transmit

rodrick



                                                                 reference range

:



                                                                 4.26 - 5.52



                                                                 10*6/?L. The



                                                                 reference range



                                                                 was not used to



                                                                 interpret this



                                                                 result as



                                                                 normal/abnormal

.

 

             HGB (test code = 14.4 g/dL    12.2-16.4                 



             718-7)                                              

 

             HCT (test code = 43.5 %       38.4-49.3                 



             4544-3)                                             

 

             MCV (test code = 85.8 fL      81.7-95.6                 



             787-2)                                              

 

             MCH (test code = 28.4 pg      26.1-32.7                 



             785-6)                                              

 

             MCHC (test code = 33.1 g/dL    31.2-35.0                 



             786-4)                                              

 

             RDW-SD (test code = 40.9 fL      38.5-51.6                 



             78731-6)                                            

 

             RDW-CV (test code = 13.2 %       12.1-15.4                 



             788-0)                                              

 

             PLT (test code =              See_Comment                [Automated



             777-3)                                              message] The



                                                                 system which



                                                                 generated this



                                                                 result transmit

rodrick



                                                                 reference range

:



                                                                 150 - 328 10*3/

?L.



                                                                 The reference



                                                                 range was not u

sed



                                                                 to interpret th

is



                                                                 result as



                                                                 normal/abnormal

.

 

             MPV (test code = 10.9 fL      9.8-13.0                  



             91122-3)                                            

 

             NRBC/100 WBC (test              See_Comment                [Automat

ed



             code = 4821635995)                                        message] 

The



                                                                 system which



                                                                 generated this



                                                                 result transmit

rodrick



                                                                 reference range

:



                                                                 0.0 - 10.0 /100



                                                                 WBCs. The



                                                                 reference range



                                                                 was not used to



                                                                 interpret this



                                                                 result as



                                                                 normal/abnormal

.

 

             NRBC x10^3 (test code <0.01        See_Comment                [Auto

mated



             = [id number])                                        message] The



                                                                 system which



                                                                 generated this



                                                                 result transmit

rodrick



                                                                 reference range

:



                                                                 10*3/?L. The



                                                                 reference range



                                                                 was not used to



                                                                 interpret this



                                                                 result as



                                                                 normal/abnormal

.

 

             GRAN MAT (NEUT) % 80.8 %                                 



             (test code = 770-8)                                        

 

             IMM GRAN % (test code 5.80 %                                 



             = [id number])                                        

 

             LYMPH % (test code = 7.4 %                                  



             736-9)                                              

 

             MONO % (test code = 4.9 %                                  



             5905-5)                                             

 

             EOS % (test code = 0.6 %                                  



             713-8)                                              

 

             BASO % (test code = 0.5 %                                  



             706-2)                                              

 

             GRAN MAT x10^3(ANC) 17.18 10*3/uL 1.99-6.95    H            



             (test code =                                        



             3355704874)                                         

 

             IMM GRAN x10^3 (test 1.23 10*3/uL 0.00-0.06    H            



             code = 4594032981)                                        

 

             LYMPH x10^3 (test code 1.57 10*3/uL 1.09-3.23                 



             = 731-0)                                            

 

             MONO x10^3 (test code 1.04 10*3/uL 0.36-1.02    H            



             = 742-7)                                            

 

             EOS x10^3 (test code = 0.13 10*3/uL 0.06-0.53                 



             711-2)                                              

 

             BASO x10^3 (test code 0.10 10*3/uL 0.01-0.09    H            



             = 704-7)                                            

 

             Lab Interpretation Abnormal                               



             (test code = 60865-8)                                        



[facility] METABOLIC PANEL (NA, K, CL, CO2, 
GLUCOSE, BUN, CREATININE, CA)2021 12:01:30





             Test Item    Value        Reference Range Interpretation Comments

 

             NA (test code = 135 mmol/L   135-145                   



             [id number])                                         

 

             K (test code = 4.3 mmol/L   3.5-5.0                   



             6935272497)                                         

 

             CL (test code = 102 mmol/L                       



             8588200219)                                         

 

             CO2 TOTAL (test code = 27 mmol/L    23-31                     



             1941600008)                                         

 

             AGAP (test code =              2-16                      



             7870550623)                                         

 

             BUN (test code = 21 mg/dL     7-23                      



             3768684436)                                         

 

             GLUCOSE (test code = 99 mg/dL                         



             2211551479)                                         

 

             CREATININE (test code = 0.57 mg/dL   0.60-1.25    L            



             6969517908)                                         

 

             CALCIUM (test code = 8.3 mg/dL    8.6-10.6     L            



             4404317735)                                         

 

             eGFR Calculation              mL/min/1.73m2              



             (Non-)                                        



             (test code =                                        



             7826887019)                                         

 

             eGFR Calculation              mL/min/1.73m2              



             (African American)                                        



             (test code =                                        



             4334556558)                                         

 

             MISA (test code = MISA) Association of                           



                          Glomerular Filtration                           



                          Rate (GFR) and Staging                           



                          of Kidney Disease*                           



                          +---------------------                           



                          --+-------------------                           



                          --+-------------------                           



                          ------+| GFR                           



                          (mL/min/1.73 m2) ?|                           



                          With Kidney Damage ?|                           



                          ?Without Kidney                           



                          Damage+---------------                           



                          --------+-------------                           



                          --------+-------------                           



                          ------------+| ?>90 ?                           



                          ? ? ? ? ? ? ? ?|                           



                          ?Stage one ? ? ? ? ?|                           



                          ? Normal ? ? ? ? ? ? ?                           



                          ?+--------------------                           



                          ---+------------------                           



                          ---+------------------                           



                          -------+| ?60-89 ? ? ?                           



                          ? ? ? ? ?| ?Stage two                           



                          ? ? ? ? ?| ? Decreased                           



                          GFR ? ? ? ?                            



                          +---------------------                           



                          --+-------------------                           



                          --+-------------------                           



                          ------+| ?30-59 ? ? ?                           



                          ? ? ? ? ?| ?Stage                           



                          three ? ? ? ?| ? Stage                           



                          three ? ? ? ? ?                           



                          +---------------------                           



                          --+-------------------                           



                          --+-------------------                           



                          ------+| ?15-29 ? ? ?                           



                          ? ? ? ? ?| ?Stage four                           



                          ? ? ? ? | ? Stage four                           



                          ? ? ? ? ?                              



                          ?+--------------------                           



                          ---+------------------                           



                          ---+------------------                           



                          -------+| ?<15 (or                           



                          dialysis) ? ?| ?Stage                           



                          five ? ? ? ? | ? Stage                           



                          five ? ? ? ? ?                           



                          ?+--------------------                           



                          ---+------------------                           



                          ---+------------------                           



                          -------+ *Each stage                           



                          assumes the associated                           



                          GFR level has been in                           



                          effect for at least                           



                          three months. ?Stages                           



                          1 to 5, with or                           



                          without kidney                           



                          disease, indicate                           



                          chronic kidney                           



                          disease. Notes:                           



                          Determination of                           



                          stages one and two                           



                          (with eGFR                             



                          >59mL/min/1.73 m2)                           



                          requires estimation of                           



                          kidney damage for at                           



                          least three months as                           



                          defined by structural                           



                          or functional                           



                          abnormalities of the                           



                          kidney, manifested by                           



                          either:Pathological                           



                          abnormalities or                           



                          Markers of kidney                           



                          damage (including                           



                          abnormalities in the                           



                          composition of the                           



                          blood or urine or                           



                          abnormalities in                           



                          imaging tests).                           

 

             Lab Interpretation Abnormal                               



             (test code = 77918-8)                                        



[facility] CULTURE [id number] 17:01:00





             Test Item    Value        Reference Range Interpretation Comments

 

             Blood Culture-Aerobic No organisms No growth                 Previo

us



             (test code = 17928-3) isolated                               prelim

inary



                                                                 verified result



                                                                 was Culture In



                                                                 Progress on



                                                                 3/1/2021 at 140

2



                                                                 CSTPrevious



                                                                 preliminary



                                                                 verified result



                                                                 was No growth a

t



                                                                 24 hours on



                                                                 3/2/2021 at 110

1



                                                                 CSTPrevious



                                                                 preliminary



                                                                 verified result



                                                                 was No growth a

t



                                                                 48 hours on



                                                                 3/3/2021 at 110

1



                                                                 CSTPrevious



                                                                 preliminary



                                                                 verified result



                                                                 was No growth a

t



                                                                 72 hours on



                                                                 3/4/2021 at 110

1



                                                                 CST

 

             Blood        No organisms No growth                 Previous



             Culture-Anaerobic isolated                               preliminar

y



             (test code = 41004-0)                                        verifi

ed result



                                                                 was Culture In



                                                                 Progress on



                                                                 3/1/2021 at 140

2



                                                                 CSTPrevious



                                                                 preliminary



                                                                 verified result



                                                                 was No growth a

t



                                                                 24 hours on



                                                                 3/2/2021 at 110

1



                                                                 CSTPrevious



                                                                 preliminary



                                                                 verified result



                                                                 was No growth a

t



                                                                 48 hours on



                                                                 3/3/2021 at 110

1



                                                                 CSTPrevious



                                                                 preliminary



                                                                 verified result



                                                                 was No growth a

t



                                                                 72 hours on



                                                                 3/4/2021 at 110

1



                                                                 CST

 

             Lab Interpretation Normal                                 



             (test code = 82412-2)                                        



[facility] CULTURE [id number] 17:01:00





             Test Item    Value        Reference Range Interpretation Comments

 

             Blood Culture-Aerobic No organisms No growth                 Previo

us



             (test code = 17928-3) isolated                               prelim

inary



                                                                 verified result



                                                                 was Culture In



                                                                 Progress on



                                                                 3/1/2021 at 140

2



                                                                 CSTPrevious



                                                                 preliminary



                                                                 verified result



                                                                 was No growth a

t



                                                                 24 hours on



                                                                 3/2/2021 at 110

1



                                                                 CSTPrevious



                                                                 preliminary



                                                                 verified result



                                                                 was No growth a

t



                                                                 48 hours on



                                                                 3/3/2021 at 110

1



                                                                 CSTPrevious



                                                                 preliminary



                                                                 verified result



                                                                 was No growth a

t



                                                                 72 hours on



                                                                 3/4/2021 at 110

1



                                                                 CST

 

             Blood        No organisms No growth                 Previous



             Culture-Anaerobic isolated                               preliminar

y



             (test code = 68930-9)                                        verifi

ed result



                                                                 was Culture In



                                                                 Progress on



                                                                 3/1/2021 at 140

2



                                                                 CSTPrevious



                                                                 preliminary



                                                                 verified result



                                                                 was No growth a

t



                                                                 24 hours on



                                                                 3/2/2021 at 110

1



                                                                 CSTPrevious



                                                                 preliminary



                                                                 verified result



                                                                 was No growth a

t



                                                                 48 hours on



                                                                 3/3/2021 at 110

1



                                                                 CSTPrevious



                                                                 preliminary



                                                                 verified result



                                                                 was No growth a

t



                                                                 72 hours on



                                                                 3/4/2021 at 110

1



                                                                 CST

 

             Lab Interpretation Normal                                 



             (test code = 19510-4)                                        



[id number]





             Test Item    Value        Reference Range Interpretation Comments

 

             CRP (test code = 5821627455) 18.1 mg/dL   <0.8         H           

 

 

             Lab Interpretation (test code = Abnormal                           

    



             31236-7)                                            



[id number]





             Test Item    Value        Reference Range Interpretation Comments

 

             Procalcitonin (test 0.41 ng/mL   <0.07        H            



             code = 2852338089)                                        

 

             MISA (test code = MISA) INTERPRETATION OF                           



                          PROCALCITONIN RESULTS IN                           



                          ADULTS >= 18 YEARS OF                           



                          AGE Initiation and                           



                          discontinuation of                           



                          antibiotics on patients                           



                          with suspected or                           



                          confirmed Lower                           



                          Respiratory Tract                           



                          Infection in Adults >=                           



                          18 years of age.                           



                          +--------------+--------                           



                          --------+---------------                           



                          +-----------------------                           



                          -----+|Procalcitonin                           



                          |Interpretation                           



                          ?|Antibiotic ? ?                           



                          |Considerations ? ? ? ?                           



                          ? ? ? |ng/mL ? ? ? ? | ?                           



                          ? ? ? ? ? ?                            



                          ?|recommendation | ? ? ?                           



                          ? ? ? ? ? ? ? ? ? ? ?                           



                          +--------------+--------                           



                          --------+---------------                           



                          +-----------------------                           



                          -----+| <0.1 ? ? ? ? |                           



                          Bacterial ? ? ?|                           



                          Strongly ? ? ?| ? ? ? ?                           



                          ? ? ? ? ? ? ? ? ? ? | ?                           



                          ? ? ? ? ? ?| infection                           



                          very | discouraged ? |                           



                          Overruling: ? ? ? ? ? ?                           



                          ? ? | ? ? ? ? ? ? ?|                           



                          unlikely ? ? ? | ? ? ? ?                           



                          ? ? ? | ? Clinically                           



                          unstable ? ? ?                           



                          +--------------+--------                           



                          --------+---------------                           



                          + ? High risk for                           



                          adverse ? ? | <0.25 ? ?                           



                          ? ?| Bacterial ? ? ?|                           



                          Discouraged ? | ?                           



                          outcome ? ? ? ? ? ? ? ?                           



                          ? | ? ? ? ? ? ? ?|                           



                          infection ? ? ?| ? ? ? ?                           



                          ? ? ? | ? SEE IMPORTANT                           



                          NOTE ? ? ? ?| ? ? ? ? ?                           



                          ? ?| unlikely ? ? ? | ?                           



                          ? ? ? ? ? ? | ? ? ? ? ?                           



                          ? ? ? ? ? ? ? ? ?                           



                          +--------------+--------                           



                          --------+---------------                           



                          +-----------------------                           



                          -----+| >=0.25 ? ? ? |                           



                          Bacterial ? ? ?|                           



                          Encouraged ? ?| ? ? ? ?                           



                          ? ? ? ? ? ? ? ? ? ? | ?                           



                          ? ? ? ? ? ?| infection ?                           



                          ? ?| ? ? ? ? ? ? ? | ? ?                           



                          ? ? ? ? ? ? ? ? ? ? ? ?                           



                          | ? ? ? ? ? ? ?| likely                           



                          ? ? ? ? | ? ? ? ? ? ? ?                           



                          | Consider treatment                           



                          failure                                



                          ?+--------------+-------                           



                          ---------+--------------                           



                          -+ if levels does not                           



                          decrease | >0.5 ? ? ? ?                           



                          | Bacterial ? ? ?|                           



                          Strongly ? ? ?|                           



                          appropriately ? ? ? ? ?                           



                          ? ? | ? ? ? ? ? ? ?|                           



                          infection very |                           



                          encouraged ? ?| ? ? ? ?                           



                          ? ? ? ? ? ? ? ? ? ? | ?                           



                          ? ? ? ? ? ?| likely ? ?                           



                          ? ? | ? ? ? ? ? ? ? | ?                           



                          ? ? ? ? ? ? ? ? ? ? ? ?                           



                          ?                                      



                          +--------------+--------                           



                          --------+---------------                           



                          +-----------------------                           



                          -----+ Discontinuation                           



                          of antibiotics in                           



                          high-acuity patients                           



                          with suspected or                           



                          confirmed sepsis in                           



                          Adults >= 18 years of                           



                          age.                                   



                          +--------------+--------                           



                          --------+---------------                           



                          +-----------------------                           



                          -----+|Procalcitonin                           



                          |Interpretation                           



                          ?|Antibiotic ? ?                           



                          |Considerations ? ? ? ?                           



                          ? ? ? |ng/mL ? ? ? ? | ?                           



                          ? ? ? ? ? ?                            



                          ?|recommendation | ? ? ?                           



                          ? ? ? ? ? ? ? ? ? ? ?                           



                          +--------------+--------                           



                          --------+---------------                           



                          +-----------------------                           



                          -----+| <0.25 ? ? ? ?|                           



                          Bacterial ? ? ?|                           



                          Strongly ? ? ?| ? ? ? ?                           



                          ? ? ? ? ? ? ? ? ? ? | ?                           



                          ? ? ? ? ? ?| infection                           



                          very | discouraged ? |                           



                          Overruling: ? ? ? ? ? ?                           



                          ? ? | ? ? ? ? ? ? ?|                           



                          unlikely ? ? ? | ? ? ? ?                           



                          ? ? ? | ? Clinically                           



                          unstable ? ? ?                           



                          +--------------+--------                           



                          --------+---------------                           



                          + ? High risk for                           



                          adverse ? ? | <0.5 or                           



                          drop | Bacterial ? ? ?|                           



                          Discouraged ? | ?                           



                          outcome ? ? ? ? ? ? ? ?                           



                          ? | >80% from ? ?|                           



                          infection ? ? ?| ? ? ? ?                           



                          ? ? ? | ? SEE IMPORTANT                           



                          NOTE ? ? ? ?| highest                           



                          PCT ?| unlikely ? ? ? |                           



                          ? ? ? ? ? ? ? | ? ? ? ?                           



                          ? ? ? ? ? ? ? ? ? ? |                           



                          level ? ? ? ?| ? ? ? ? ?                           



                          ? ? ?| ? ? ? ? ? ? ? | ?                           



                          ? ? ? ? ? ? ? ? ? ? ? ?                           



                          ?                                      



                          +--------------+--------                           



                          --------+---------------                           



                          +-----------------------                           



                          -----+| >=0.5 ? ? ? ?|                           



                          Bacterial ? ? ?|                           



                          Encouraged ? ?| ? ? ? ?                           



                          ? ? ? ? ? ? ? ? ? ? | ?                           



                          ? ? ? ? ? ?| infection ?                           



                          ? ?| ? ? ? ? ? ? ? | ? ?                           



                          ? ? ? ? ? ? ? ? ? ? ? ?                           



                          | ? ? ? ? ? ? ?| likely                           



                          ? ? ? ? | ? ? ? ? ? ? ?                           



                          | Consider treatment                           



                          failure                                



                          ?+--------------+-------                           



                          ---------+--------------                           



                          -+ if levels does not                           



                          decrease | >1.0 ? ? ? ?                           



                          | Bacterial ? ? ?|                           



                          Strongly ? ? ?|                           



                          appropriately ? ? ? ? ?                           



                          ? ? | ? ? ? ? ? ? ?|                           



                          infection very |                           



                          encouraged ? ?| ? ? ? ?                           



                          ? ? ? ? ? ? ? ? ? ? | ?                           



                          ? ? ? ? ? ?| likely ? ?                           



                          ? ? | ? ? ? ? ? ? ? | ?                           



                          ? ? ? ? ? ? ? ? ? ? ? ?                           



                          ?                                      



                          +--------------+--------                           



                          --------+---------------                           



                          +-----------------------                           



                          -----+ Percentage of                           



                          drop of Procalcitonin                           



                          calculation for                           



                          Discontinuation of                           



                          antibiotics in                           



                          high-acuity patients                           



                          with suspected or                           



                          confirmed sepsis in                           



                          Adults >= 18 years of                           



                          age.  ? ? ? ? ? ? ? ? ?                           



                          ? ? Procalcitonin                           



                          highest{}-Procalcitonin                           



                          current{}Delta                           



                          Procalcitonin =                           



                          ________________________                           



                          _______________________                           



                          x100%  ? ? ? ? ? ? ? ? ?                           



                          ? ? ? ? ? ? ?                           



                          Procalcitonin current {}                           



                          IMPORTANT NOTE:                           



                          Procalcitonin may be                           



                          elevated without                           



                          bacterial infection by                           



                          physiologic stress                           



                          related to trauma,                           



                          burns, chronic dialysis,                           



                          metastatic cancer,                           



                          surgery in the past                           



                          seven days, malaria,                           



                          some fungal infections,                           



                          and some forms of                           



                          vasculitis. The                           



                          interpretation algorithm                           



                          may not apply to                           



                          patients with                           



                          immunosuppression                           



                          (equivalent of >10 mg of                           



                          prednisone daily), HIV                           



                          with CD4 cell count <                           



                          350 cells/mm3, active                           



                          malignancy on systemic                           



                          chemotherapy, solid                           



                          organ transplant or                           



                          hematopoietic stem cell                           



                          transplantation, or                           



                          hospital acquired                           



                          pneumonia. Additionally,                           



                          some clinical trials of                           



                          procalcitonin have                           



                          excluded patients with                           



                          shock requiring                           



                          vasopressor use, acute                           



                          respiratory failure                           



                          requiring mechanical                           



                          ventilation, or those                           



                          with known lung                           



                          abscess/empyema. For                           



                          further information                           



                          please refer                           



                          to:[email or web address]                           



                          [email or web address]                           

 

             Lab Interpretation Abnormal                               



             (test code = 25765-6)                                        



[facility] WITH [id number] 14:42:00





             Test Item    Value        Reference Range Interpretation Comments

 

             WBC (test code =              See_Comment  H             [Automated



             2643-2)                                             message] The



                                                                 system which



                                                                 generated this



                                                                 result transmit

rodrick



                                                                 reference range

:



                                                                 4.20 - 10.70



                                                                 10*3/?L. The



                                                                 reference range



                                                                 was not used to



                                                                 interpret this



                                                                 result as



                                                                 normal/abnormal

.

 

             RBC (test code =              See_Comment                [Automated



             157-8)                                              message] The



                                                                 system which



                                                                 generated this



                                                                 result transmit

rodrick



                                                                 reference range

:



                                                                 4.26 - 5.52



                                                                 10*6/?L. The



                                                                 reference range



                                                                 was not used to



                                                                 interpret this



                                                                 result as



                                                                 normal/abnormal

.

 

             HGB (test code = 15.5 g/dL    12.2-16.4                 



             718-7)                                              

 

             HCT (test code = 44.5 %       38.4-49.3                 



             4544-3)                                             

 

             MCV (test code = 83.2 fL      81.7-95.6                 



             787-2)                                              

 

             MCH (test code = 29.0 pg      26.1-32.7                 



             785-6)                                              

 

             MCHC (test code = 34.8 g/dL    31.2-35.0                 



             786-4)                                              

 

             RDW-SD (test code = 41.8 fL      38.5-51.6                 



             71921-3)                                            

 

             RDW-CV (test code = 13.8 %       12.1-15.4                 



             788-0)                                              

 

             PLT (test code =              See_Comment                [Automated



             777-3)                                              message] The



                                                                 system which



                                                                 generated this



                                                                 result transmit

rodrick



                                                                 reference range

:



                                                                 150 - 328 10*3/

?L.



                                                                 The reference



                                                                 range was not u

sed



                                                                 to interpret th

is



                                                                 result as



                                                                 normal/abnormal

.

 

             MPV (test code = 9.8 fL       9.8-13.0                  



             86556-6)                                            

 

             NRBC/100 WBC (test              See_Comment                [Automat

ed



             code = 7597268772)                                        message] 

The



                                                                 system which



                                                                 generated this



                                                                 result transmit

rodrick



                                                                 reference range

:



                                                                 0.0 - 10.0 /100



                                                                 WBCs. The



                                                                 reference range



                                                                 was not used to



                                                                 interpret this



                                                                 result as



                                                                 normal/abnormal

.

 

             NRBC x10^3 (test code <0.01        See_Comment                [Auto

mated



             = [id number])                                        message] The



                                                                 system which



                                                                 generated this



                                                                 result transmit

rodrick



                                                                 reference range

:



                                                                 10*3/?L. The



                                                                 reference range



                                                                 was not used to



                                                                 interpret this



                                                                 result as



                                                                 normal/abnormal

.

 

             GRAN MAT (NEUT) % 68.9 %                                 



             (test code = 770-8)                                        

 

             IMM GRAN % (test code 2.40 %                                 



             = 3098878328)                                        

 

             LYMPH % (test code = 8.1 %                                  



             736-9)                                              

 

             MONO % (test code = 11.7 %                                 



             5905-5)                                             

 

             EOS % (test code = 8.3 %                                  



             713-8)                                              

 

             BASO % (test code = 0.6 %                                  



             706-2)                                              

 

             GRAN MAT x10^3(ANC) 10.53 10*3/uL 1.99-6.95    H            



             (test code =                                        



             8504148760)                                         

 

             IMM GRAN x10^3 (test 0.36 10*3/uL 0.00-0.06    H            



             code = 2796233313)                                        

 

             LYMPH x10^3 (test code 1.23 10*3/uL 1.09-3.23                 



             = 731-0)                                            

 

             MONO x10^3 (test code 1.79 10*3/uL 0.36-1.02    H            



             = 742-7)                                            

 

             EOS x10^3 (test code = 1.26 10*3/uL 0.06-0.53    H            



             711-2)                                              

 

             BASO x10^3 (test code 0.09 10*3/uL 0.01-0.09                 



             = 704-7)                                            

 

             BANDS (test code = Increased                 A            



             5118150188)                                         

 

             Lab Interpretation Abnormal                               



             (test code = 97785-4)                                        



[facility]. METABOLIC PANEL ([id number] 
13:01:00





             Test Item    Value        Reference Range Interpretation Comments

 

             NA (test code = 134 mmol/L   135-145      L            



             2450754941)                                         

 

             K (test code = 4.2 mmol/L   3.5-5.0                   



             3160143190)                                         

 

             CL (test code = 100 mmol/L                       



             6133076231)                                         

 

             CO2 TOTAL (test code = 28 mmol/L    23-31                     



             2180253664)                                         

 

             AGAP (test code =              2-16                      



             8627053852)                                         

 

             BUN (test code = 22 mg/dL     7-23                      



             7176229086)                                         

 

             GLUCOSE (test code = 100 mg/dL                        



             7312144180)                                         

 

             CREATININE (test code = 0.61 mg/dL   0.60-1.25                 



             7910781741)                                         

 

             TOTAL BILI (test code = 0.7 mg/dL    0.1-1.1                   



             2877040742)                                         

 

             CALCIUM (test code = 8.4 mg/dL    8.6-10.6     L            



             0594715507)                                         

 

             T PROTEIN (test code = 5.6 g/dL     6.3-8.2      L            



             8922008064)                                         

 

             ALBUMIN (test code = 3.0 g/dL     3.5-5.0      L            



             1068369280)                                         

 

             ALK PHOS (test code = 87 U/L                           



             3574159221)                                         

 

             ALTv (test code = 60 U/L       5-50         H            



             1742-6)                                             

 

             AST(SGOT) (test code = 38 U/L       13-40                     



             8336293226)                                         

 

             eGFR Calculation              mL/min/1.73m2              



             (Non-)                                        



             (test code =                                        



             6800004062)                                         

 

             eGFR Calculation              mL/min/1.73m2              



             (African American)                                        



             (test code =                                        



             1656240327)                                         

 

             MISA (test code = MISA) Association of                           



                          Glomerular Filtration                           



                          Rate (GFR) and Staging                           



                          of Kidney Disease*                           



                          +---------------------                           



                          --+-------------------                           



                          --+-------------------                           



                          ------+| GFR                           



                          (mL/min/1.73 m2) ?|                           



                          With Kidney Damage ?|                           



                          ?Without Kidney                           



                          Damage+---------------                           



                          --------+-------------                           



                          --------+-------------                           



                          ------------+| ?>90 ?                           



                          ? ? ? ? ? ? ? ?|                           



                          ?Stage one ? ? ? ? ?|                           



                          ? Normal ? ? ? ? ? ? ?                           



                          ?+--------------------                           



                          ---+------------------                           



                          ---+------------------                           



                          -------+| ?60-89 ? ? ?                           



                          ? ? ? ? ?| ?Stage two                           



                          ? ? ? ? ?| ? Decreased                           



                          GFR ? ? ? ?                            



                          +---------------------                           



                          --+-------------------                           



                          --+-------------------                           



                          ------+| ?30-59 ? ? ?                           



                          ? ? ? ? ?| ?Stage                           



                          three ? ? ? ?| ? Stage                           



                          three ? ? ? ? ?                           



                          +---------------------                           



                          --+-------------------                           



                          --+-------------------                           



                          ------+| ?15-29 ? ? ?                           



                          ? ? ? ? ?| ?Stage four                           



                          ? ? ? ? | ? Stage four                           



                          ? ? ? ? ?                              



                          ?+--------------------                           



                          ---+------------------                           



                          ---+------------------                           



                          -------+| ?<15 (or                           



                          dialysis) ? ?| ?Stage                           



                          five ? ? ? ? | ? Stage                           



                          five ? ? ? ? ?                           



                          ?+--------------------                           



                          ---+------------------                           



                          ---+------------------                           



                          -------+ *Each stage                           



                          assumes the associated                           



                          GFR level has been in                           



                          effect for at least                           



                          three months. ?Stages                           



                          1 to 5, with or                           



                          without kidney                           



                          disease, indicate                           



                          chronic kidney                           



                          disease. Notes:                           



                          Determination of                           



                          stages one and two                           



                          (with eGFR                             



                          >59mL/min/1.73 m2)                           



                          requires estimation of                           



                          kidney damage for at                           



                          least three months as                           



                          defined by structural                           



                          or functional                           



                          abnormalities of the                           



                          kidney, manifested by                           



                          either:Pathological                           



                          abnormalities or                           



                          Markers of kidney                           



                          damage (including                           



                          abnormalities in the                           



                          composition of the                           



                          blood or urine or                           



                          abnormalities in                           



                          imaging tests).                           

 

             Lab Interpretation Abnormal                               



             (test code = 18676-4)                                        



[facility]





             Test Item    Value        Reference Range Interpretation Comments

 

             POCT GLU (test code = 3362141424) 98 mg/dL                   

      

 

             Lab Interpretation (test code = Normal                             

    



             83435-9)                                            



[id number]





             Test Item    Value        Reference Range Interpretation Comments

 

             TSH (test code =              See_Comment               Biotin has 

been



             [id number])                                         reported to cau

se a



                                                                 negative bias,



                                                                 interpret resul

ts



                                                                 relative to pat

ient's



                                                                 use of biotin.



                                                                 [Automated mess

age]



                                                                 The system [employer]



                                                                 generated this 

result



                                                                 transmitted ref

erence



                                                                 range: 0.45 - 4

.70



                                                                 mIU/L. The refe

rence



                                                                 range was not u

sed to



                                                                 interpret this 

result



                                                                 as normal/abnor

mal.

 

             Lab Interpretation (test Normal                                 



             code = 75952-2)                                        



[id number]44:00





             Test Item    Value        Reference Range Interpretation Comments

 

             CRP (test code = 2645720545) 35.9 mg/dL   <0.8         H           

 

 

             Lab Interpretation (test code = Abnormal                           

    



             68611-2)                                            



[facility] with [id number] 13:01:00





             Test Item    Value        Reference Range Interpretation Comments

 

             WBC (test code =              See_Comment  H             [Automated



             6690-2)                                             message] The



                                                                 system which



                                                                 generated this



                                                                 result transmit

rodrick



                                                                 reference range

:



                                                                 4.20 - 10.70



                                                                 10*3/?L. The



                                                                 reference range



                                                                 was not used to



                                                                 interpret this



                                                                 result as



                                                                 normal/abnormal

.

 

             RBC (test code =              See_Comment                [Automated



             789-8)                                              message] The



                                                                 system which



                                                                 generated this



                                                                 result transmit

rodrick



                                                                 reference range

:



                                                                 4.26 - 5.52



                                                                 10*6/?L. The



                                                                 reference range



                                                                 was not used to



                                                                 interpret this



                                                                 result as



                                                                 normal/abnormal

.

 

             HGB (test code = 14.7 g/dL    12.2-16.4                 



             718-7)                                              

 

             HCT (test code = 42.9 %       38.4-49.3                 



             4544-3)                                             

 

             MCV (test code = 84.0 fL      81.7-95.6                 



             787-2)                                              

 

             MCH (test code = 28.8 pg      26.1-32.7                 



             785-6)                                              

 

             MCHC (test code = 34.3 g/dL    31.2-35.0                 



             786-4)                                              

 

             RDW-SD (test code = 42.5 fL      38.5-51.6                 



             49944-2)                                            

 

             RDW-CV (test code = 13.7 %       12.1-15.4                 



             788-0)                                              

 

             PLT (test code =              See_Comment                [Automated



             777-3)                                              message] The



                                                                 system which



                                                                 generated this



                                                                 result transmit

rodrick



                                                                 reference range

:



                                                                 150 - 328 10*3/

?L.



                                                                 The reference



                                                                 range was not u

sed



                                                                 to interpret th

is



                                                                 result as



                                                                 normal/abnormal

.

 

             MPV (test code = 10.4 fL      9.8-13.0                  



             02340-7)                                            

 

             NRBC/100 WBC (test              See_Comment                [Automat

ed



             code = 8252562432)                                        message] 

The



                                                                 system which



                                                                 generated this



                                                                 result transmit

rodrick



                                                                 reference range

:



                                                                 0.0 - 10.0 /100



                                                                 WBCs. The



                                                                 reference range



                                                                 was not used to



                                                                 interpret this



                                                                 result as



                                                                 normal/abnormal

.

 

             NRBC x10^3 (test code <0.01        See_Comment                [Auto

mated



             = 2517052122)                                        message] The



                                                                 system which



                                                                 generated this



                                                                 result transmit

rodrick



                                                                 reference range

:



                                                                 10*3/?L. The



                                                                 reference range



                                                                 was not used to



                                                                 interpret this



                                                                 result as



                                                                 normal/abnormal

.

 

             GRAN MAT (NEUT) % 85.9 %                                 



             (test code = 770-8)                                        

 

             IMM GRAN % (test code 1.20 %                                 



             = 2321656000)                                        

 

             LYMPH % (test code = 4.4 %                                  



             736-9)                                              

 

             MONO % (test code = 7.9 %                                  



             5905-5)                                             

 

             EOS % (test code = 0.3 %                                  



             713-8)                                              

 

             BASO % (test code = 0.3 %                                  



             706-2)                                              

 

             GRAN MAT x10^3(ANC) 15.92 10*3/uL 1.99-6.95    H            



             (test code =                                        



             0360329660)                                         

 

             IMM GRAN x10^3 (test 0.23 10*3/uL 0.00-0.06    H            



             code = 7770389969)                                        

 

             LYMPH x10^3 (test code 0.81 10*3/uL 1.09-3.23    L            



             = 731-0)                                            

 

             MONO x10^3 (test code 1.46 10*3/uL 0.36-1.02    H            



             = 742-7)                                            

 

             EOS x10^3 (test code = 0.05 10*3/uL 0.06-0.53    L            



             711-2)                                              

 

             BASO x10^3 (test code 0.05 10*3/uL 0.01-0.09                 



             = 704-7)                                            

 

             Lab Interpretation Abnormal                               



             (test code = 29595-7)                                        



[facility] Metabolic Panel (NA, K, CL, CO2, 
GLUCOSE, BUN, CREATININE, CA)2021 11:24:00





             Test Item    Value        Reference Range Interpretation Comments

 

             NA (test code = 133 mmol/L   135-145      L            



             8199404544)                                         

 

             K (test code = 4.2 mmol/L   3.5-5.0                   



             0448475600)                                         

 

             CL (test code = 99 mmol/L                        



             7249960608)                                         

 

             CO2 TOTAL (test code = 26 mmol/L    23-31                     



             5757953190)                                         

 

             AGAP (test code =              2-16                      



             9450351905)                                         

 

             BUN (test code = 22 mg/dL     7-23                      



             4644710496)                                         

 

             GLUCOSE (test code = 92 mg/dL                         



             2534962559)                                         

 

             CREATININE (test code = 1.04 mg/dL   0.60-1.25                 



             8488149150)                                         

 

             CALCIUM (test code = 8.4 mg/dL    8.6-10.6     L            



             1107429061)                                         

 

             eGFR Calculation              mL/min/1.73m2              



             (Non-)                                        



             (test code =                                        



             0088025553)                                         

 

             eGFR Calculation              mL/min/1.73m2              



             (African American)                                        



             (test code =                                        



             5847974071)                                         

 

             MISA (test code = MISA) Association of                           



                          Glomerular Filtration                           



                          Rate (GFR) and Staging                           



                          of Kidney Disease*                           



                          +---------------------                           



                          --+-------------------                           



                          --+-------------------                           



                          ------+| GFR                           



                          (mL/min/1.73 m2) ?|                           



                          With Kidney Damage ?|                           



                          ?Without Kidney                           



                          Damage+---------------                           



                          --------+-------------                           



                          --------+-------------                           



                          ------------+| ?>90 ?                           



                          ? ? ? ? ? ? ? ?|                           



                          ?Stage one ? ? ? ? ?|                           



                          ? Normal ? ? ? ? ? ? ?                           



                          ?+--------------------                           



                          ---+------------------                           



                          ---+------------------                           



                          -------+| ?60-89 ? ? ?                           



                          ? ? ? ? ?| ?Stage two                           



                          ? ? ? ? ?| ? Decreased                           



                          GFR ? ? ? ?                            



                          +---------------------                           



                          --+-------------------                           



                          --+-------------------                           



                          ------+| ?30-59 ? ? ?                           



                          ? ? ? ? ?| ?Stage                           



                          three ? ? ? ?| ? Stage                           



                          three ? ? ? ? ?                           



                          +---------------------                           



                          --+-------------------                           



                          --+-------------------                           



                          ------+| ?15-29 ? ? ?                           



                          ? ? ? ? ?| ?Stage four                           



                          ? ? ? ? | ? Stage four                           



                          ? ? ? ? ?                              



                          ?+--------------------                           



                          ---+------------------                           



                          ---+------------------                           



                          -------+| ?<15 (or                           



                          dialysis) ? ?| ?Stage                           



                          five ? ? ? ? | ? Stage                           



                          five ? ? ? ? ?                           



                          ?+--------------------                           



                          ---+------------------                           



                          ---+------------------                           



                          -------+ *Each stage                           



                          assumes the associated                           



                          GFR level has been in                           



                          effect for at least                           



                          three months. ?Stages                           



                          1 to 5, with or                           



                          without kidney                           



                          disease, indicate                           



                          chronic kidney                           



                          disease. Notes:                           



                          Determination of                           



                          stages one and two                           



                          (with eGFR                             



                          >59mL/min/1.73 m2)                           



                          requires estimation of                           



                          kidney damage for at                           



                          least three months as                           



                          defined by structural                           



                          or functional                           



                          abnormalities of the                           



                          kidney, manifested by                           



                          either:Pathological                           



                          abnormalities or                           



                          Markers of kidney                           



                          damage (including                           



                          abnormalities in the                           



                          composition of the                           



                          blood or urine or                           



                          abnormalities in                           



                          imaging tests).                           

 

             Lab Interpretation Abnormal                               



             (test code = 37551-8)                                        



[facility] [id number] 
08:59:00





             Test Item    Value        Reference Range Interpretation Comments

 

             Legionella Urinary Negative     Negative                  



             Antigen (test code =                                        



             6083030172)                                         

 

             MISA (test code = MISA) Negative for L.                           



                          pneumophilia                           



                          serogroup I antigen                           



                          in urine suggesting                           



                          no recent or current                           



                          infection. Infection                           



                          due to Legionella                           



                          cannot be ruled out                           



                          since other                            



                          serogroups and                           



                          species may cause                           



                          disease. Furthermore,                           



                          antigens may not be                           



                          present in urine                           



                          during early stage of                           



                          infection, or the                           



                          level of antigen                           



                          present in urine may                           



                          be below the                           



                          detection limit of                           



                          the test.                              

 

             Lab Interpretation (test Normal                                 



             code = 32916-9)                                        



[id number] 08:56:00





             Test Item    Value        Reference Range Interpretation Comments

 

             S. pneumoniae antigen (test code = Negative     Negative           

       



             [id number])                                         

 

             Lab Interpretation (test code = Normal                             

    



             49662-3)                                            



[facility] 04[facility]35[facility]00





             Test Item    Value        Reference Range Interpretation Comments

 

             APPEARANCE (test code Slightly Cloudy Clear        A            



             = 5560302051)                                        

 

             COLOR (test code = Yellow       Yellow                    



             7483105890)                                         

 

             PH (test code =              4.8-8.0                   



             4096710527)                                         

 

             SP GRAVITY (test code              1.003-1.030               



             = 3690380142)                                        

 

             GLU U QUAL (test code 100 mg/dL    Negative     A            



             = [id number])                                        

 

             BLOOD (test code = Small        Negative     A            



             [id number])                                         

 

             KETONES (test code = Negative     Negative                  



             [id number])                                         

 

             PROTEIN (test code = 100 mg/dL    Negative     A            



             2887-8)                                             

 

             UROBILIN (test code = 1.0 mg/dL    See_Comment                [Auto

mated



             [id number])                                         message] The



                                                                 system which



                                                                 generated this



                                                                 result transmit

rodrick



                                                                 reference range

:



                                                                 0-1.0 mg/dL. Th

e



                                                                 reference range



                                                                 was not used to



                                                                 interpret this



                                                                 result as



                                                                 normal/abnormal

.

 

             BILIRUBIN (test code Negative     Negative                  



             = [id number])                                        

 

             NITRITE (test code = Negative     Negative                  



             [id number])                                         

 

             LEUK CLAYTON (test code Negative     Negative                  



             = [id number])                                        

 

             RBC/HPF (test code =              See_Comment  H             [Autom

ated



             [id number])                                         message] The



                                                                 system which



                                                                 generated this



                                                                 result transmit

rodrick



                                                                 reference range

: 0



                                                                 - 3 HPF. The



                                                                 reference range



                                                                 was not used to



                                                                 interpret this



                                                                 result as



                                                                 normal/abnormal

.

 

             WBC/HPF (test code =              See_Comment                [Autom

ated



             [id number])                                         message] The



                                                                 system which



                                                                 generated this



                                                                 result transmit

rodrick



                                                                 reference range

: 0



                                                                 - 5 HPF. The



                                                                 reference range



                                                                 was not used to



                                                                 interpret this



                                                                 result as



                                                                 normal/abnormal

.

 

             BACTERIA (test code = Few          Negative     A            



             [id number])                                         

 

             MUCOUS (test code = Marked       Negative LPF A            



             8208235258)                                         

 

             SQ EPITH (test code =              HPF                       



             0316870298)                                         

 

             GRAN CASTS (test code              See_Comment  H             [Auto

mated



             = [id number])                                        message] The



                                                                 system which



                                                                 generated this



                                                                 result transmit

[place]



                                                                 reference range

:



                                                                 <=1 LPF. The



                                                                 reference range



                                                                 was not used to



                                                                 interpret this



                                                                 result as



                                                                 normal/abnormal

.

 

             Lab Interpretation Abnormal                               



             (test code = 87028-3)                                        



[facility]





             Test Item    Value        Reference Range Interpretation Comments

 

             Procalcitonin (test 1.37 ng/mL   <0.07        H            



             code = 9769123595)                                        

 

             MIAS (test code = MISA) INTERPRETATION OF                           



                          PROCALCITONIN RESULTS IN                           



                          ADULTS >= 18 YEARS OF                           



                          AGE Initiation and                           



                          discontinuation of                           



                          antibiotics on patients                           



                          with suspected or                           



                          confirmed Lower                           



                          Respiratory Tract                           



                          Infection in Adults >=                           



                          18 years of age.                           



                          +--------------+--------                           



                          --------+---------------                           



                          +-----------------------                           



                          -----+|Procalcitonin                           



                          |Interpretation                           



                          ?|Antibiotic ? ?                           



                          |Considerations ? ? ? ?                           



                          ? ? ? |ng/mL ? ? ? ? | ?                           



                          ? ? ? ? ? ?                            



                          ?|recommendation | ? ? ?                           



                          ? ? ? ? ? ? ? ? ? ? ?                           



                          +--------------+--------                           



                          --------+---------------                           



                          +-----------------------                           



                          -----+| <0.1 ? ? ? ? |                           



                          Bacterial ? ? ?|                           



                          Strongly ? ? ?| ? ? ? ?                           



                          ? ? ? ? ? ? ? ? ? ? | ?                           



                          ? ? ? ? ? ?| infection                           



                          very | discouraged ? |                           



                          Overruling: ? ? ? ? ? ?                           



                          ? ? | ? ? ? ? ? ? ?|                           



                          unlikely ? ? ? | ? ? ? ?                           



                          ? ? ? | ? Clinically                           



                          unstable ? ? ?                           



                          +--------------+--------                           



                          --------+---------------                           



                          + ? High risk for                           



                          adverse ? ? | <0.25 ? ?                           



                          ? ?| Bacterial ? ? ?|                           



                          Discouraged ? | ?                           



                          outcome ? ? ? ? ? ? ? ?                           



                          ? | ? ? ? ? ? ? ?|                           



                          infection ? ? ?| ? ? ? ?                           



                          ? ? ? | ? SEE IMPORTANT                           



                          NOTE ? ? ? ?| ? ? ? ? ?                           



                          ? ?| unlikely ? ? ? | ?                           



                          ? ? ? ? ? ? | ? ? ? ? ?                           



                          ? ? ? ? ? ? ? ? ?                           



                          +--------------+--------                           



                          --------+---------------                           



                          +-----------------------                           



                          -----+| >=0.25 ? ? ? |                           



                          Bacterial ? ? ?|                           



                          Encouraged ? ?| ? ? ? ?                           



                          ? ? ? ? ? ? ? ? ? ? | ?                           



                          ? ? ? ? ? ?| infection ?                           



                          ? ?| ? ? ? ? ? ? ? | ? ?                           



                          ? ? ? ? ? ? ? ? ? ? ? ?                           



                          | ? ? ? ? ? ? ?| likely                           



                          ? ? ? ? | ? ? ? ? ? ? ?                           



                          | Consider treatment                           



                          failure                                



                          ?+--------------+-------                           



                          ---------+--------------                           



                          -+ if levels does not                           



                          decrease | >0.5 ? ? ? ?                           



                          | Bacterial ? ? ?|                           



                          Strongly ? ? ?|                           



                          appropriately ? ? ? ? ?                           



                          ? ? | ? ? ? ? ? ? ?|                           



                          infection very |                           



                          encouraged ? ?| ? ? ? ?                           



                          ? ? ? ? ? ? ? ? ? ? | ?                           



                          ? ? ? ? ? ?| likely ? ?                           



                          ? ? | ? ? ? ? ? ? ? | ?                           



                          ? ? ? ? ? ? ? ? ? ? ? ?                           



                          ?                                      



                          +--------------+--------                           



                          --------+---------------                           



                          +-----------------------                           



                          -----+ Discontinuation                           



                          of antibiotics in                           



                          high-acuity patients                           



                          with suspected or                           



                          confirmed sepsis in                           



                          Adults >= 18 years of                           



                          age.                                   



                          +--------------+--------                           



                          --------+---------------                           



                          +-----------------------                           



                          -----+|Procalcitonin                           



                          |Interpretation                           



                          ?|Antibiotic ? ?                           



                          |Considerations ? ? ? ?                           



                          ? ? ? |ng/mL ? ? ? ? | ?                           



                          ? ? ? ? ? ?                            



                          ?|recommendation | ? ? ?                           



                          ? ? ? ? ? ? ? ? ? ? ?                           



                          +--------------+--------                           



                          --------+---------------                           



                          +-----------------------                           



                          -----+| <0.25 ? ? ? ?|                           



                          Bacterial ? ? ?|                           



                          Strongly ? ? ?| ? ? ? ?                           



                          ? ? ? ? ? ? ? ? ? ? | ?                           



                          ? ? ? ? ? ?| infection                           



                          very | discouraged ? |                           



                          Overruling: ? ? ? ? ? ?                           



                          ? ? | ? ? ? ? ? ? ?|                           



                          unlikely ? ? ? | ? ? ? ?                           



                          ? ? ? | ? Clinically                           



                          unstable ? ? ?                           



                          +--------------+--------                           



                          --------+---------------                           



                          + ? High risk for                           



                          adverse ? ? | <0.5 or                           



                          drop | Bacterial ? ? ?|                           



                          Discouraged ? | ?                           



                          outcome ? ? ? ? ? ? ? ?                           



                          ? | >80% from ? ?|                           



                          infection ? ? ?| ? ? ? ?                           



                          ? ? ? | ? SEE IMPORTANT                           



                          NOTE ? ? ? ?| highest                           



                          PCT ?| unlikely ? ? ? |                           



                          ? ? ? ? ? ? ? | ? ? ? ?                           



                          ? ? ? ? ? ? ? ? ? ? |                           



                          level ? ? ? ?| ? ? ? ? ?                           



                          ? ? ?| ? ? ? ? ? ? ? | ?                           



                          ? ? ? ? ? ? ? ? ? ? ? ?                           



                          ?                                      



                          +--------------+--------                           



                          --------+---------------                           



                          +-----------------------                           



                          -----+| >=0.5 ? ? ? ?|                           



                          Bacterial ? ? ?|                           



                          Encouraged ? ?| ? ? ? ?                           



                          ? ? ? ? ? ? ? ? ? ? | ?                           



                          ? ? ? ? ? ?| infection ?                           



                          ? ?| ? ? ? ? ? ? ? | ? ?                           



                          ? ? ? ? ? ? ? ? ? ? ? ?                           



                          | ? ? ? ? ? ? ?| likely                           



                          ? ? ? ? | ? ? ? ? ? ? ?                           



                          | Consider treatment                           



                          failure                                



                          ?+--------------+-------                           



                          ---------+--------------                           



                          -+ if levels does not                           



                          decrease | >1.0 ? ? ? ?                           



                          | Bacterial ? ? ?|                           



                          Strongly ? ? ?|                           



                          appropriately ? ? ? ? ?                           



                          ? ? | ? ? ? ? ? ? ?|                           



                          infection very |                           



                          encouraged ? ?| ? ? ? ?                           



                          ? ? ? ? ? ? ? ? ? ? | ?                           



                          ? ? ? ? ? ?| likely ? ?                           



                          ? ? | ? ? ? ? ? ? ? | ?                           



                          ? ? ? ? ? ? ? ? ? ? ? ?                           



                          ?                                      



                          +--------------+--------                           



                          --------+---------------                           



                          +-----------------------                           



                          -----+ Percentage of                           



                          drop of Procalcitonin                           



                          calculation for                           



                          Discontinuation of                           



                          antibiotics in                           



                          high-acuity patients                           



                          with suspected or                           



                          confirmed sepsis in                           



                          Adults >= 18 years of                           



                          age.  ? ? ? ? ? ? ? ? ?                           



                          ? ? Procalcitonin                           



                          highest{}-Procalcitonin                           



                          current{}Delta                           



                          Procalcitonin =                           



                          ________________________                           



                          _______________________                           



                          x100%  ? ? ? ? ? ? ? ? ?                           



                          ? ? ? ? ? ? ?                           



                          Procalcitonin current {}                           



                          IMPORTANT NOTE:                           



                          Procalcitonin may be                           



                          elevated without                           



                          bacterial infection by                           



                          physiologic stress                           



                          related to trauma,                           



                          burns, chronic dialysis,                           



                          metastatic cancer,                           



                          surgery in the past                           



                          seven days, malaria,                           



                          some fungal infections,                           



                          and some forms of                           



                          vasculitis. The                           



                          interpretation algorithm                           



                          may not apply to                           



                          patients with                           



                          immunosuppression                           



                          (equivalent of >10 mg of                           



                          prednisone daily), HIV                           



                          with CD4 cell count <                           



                          350 cells/mm3, active                           



                          malignancy on systemic                           



                          chemotherapy, solid                           



                          organ transplant or                           



                          hematopoietic stem cell                           



                          transplantation, or                           



                          hospital acquired                           



                          pneumonia. Additionally,                           



                          some clinical trials of                           



                          procalcitonin have                           



                          excluded patients with                           



                          shock requiring                           



                          vasopressor use, acute                           



                          respiratory failure                           



                          requiring mechanical                           



                          ventilation, or those                           



                          with known lung                           



                          abscess/empyema. For                           



                          further information                           



                          please refer                           



                          to:[email or web address]                           



                          [email or web address]                           

 

             Lab Interpretation Abnormal                               



             (test code = 17585-2)                                        



[facility]:00





             Test Item    Value        Reference Range Interpretation Comments

 

             VIT D 25OH (test code = 33 ng/mL     25-80                     



             70651-4)                                            

 

             MISA (test code = MISA) Deficiency: <20                           



                          ng/mLInsufficiency                           



                          : 20-24                                



                          ng/mLOptimal:                           



                          25-80 ng/mL                            

 

             Lab Interpretation (test Normal                                 



             code = 38020-8)                                        



[facility] 
21:23:00





             Test Item    Value        Reference Range Interpretation Comments

 

             HGB A1C (test code = 5.9 %        4.0-6.0                   



             4548-4)                                             

 

             MISA (test code = MISA) %A1C (NGSP)                            



                          Interpretation                           



                          (ADA)4.8-5.6 ? ?                           



                          Normal or                              



                          (Non-Diabetic                           



                          Range)5.7-6.4 ? ?                           



                          Increased Risk                           



                          (Pre-Diabetic)>6.5 ? ?                           



                          ? ?Diabetes Indicated                           

 

             Lab Interpretation Normal                                 



             (test code = 55480-4)                                        



[facility] [id number] 
20:27:24 No acute pulmonary embolus. Diffuse groundglass/nodular opacities 
predominantly within the periphery ofboth lungs, consistent with known Covid-19 
infection. _______________________________ IPaul MD., have reviewed 
this study and agree with the abovereport.EXAM: CT CHEST PULMONARY ANGIOGRAM 
CLINICAL INDICATION: PE suspected, high pretest prob COVID pna ?  COMPARISON: 
?None. TECHNIQUE: ?Helical CT was performed and reconstructed at 1.25 mm 
slicethickness from lung bases to apices using 100 mL Omnipaque 
intravenouscontrast, without complication. ? Axial MIPS and coronal/sagittal 
MPRS werereconstructed. ? (DFOV = 30 cm) FINDINGS: PULMONARY ARTERIES: 
Enhancement is excellent. There is no filling defect within the 
pulmonaryarterial system. CHEST: Lower neck/thyroid: Unremarkable. Lungs: Dif
fuse groundglass/nodular opacities predominantly along theperiphery of both 
lungs. Central airway: Unremarkable. Pleura: No pleural effusion, thickening or 
pneumothorax. Thoracic aorta and great vessels: ?Normal in diameter. Heart and 
pericardium: The heart is normal in size. . No pericardialeffusion.Lymph nodes: 
Enlarged left paratracheal node measuring 1.2 cm (4:27). Mediastinum: 
Unremarkable. Bones and soft tissues: No acute osseous abnormality. Soft tissues
areunremarkable. Visualized upper abdomen: Unremarkable. 
_______________________________ [facility], Radiant Results Inft User - 2021  
2:28 PM CSTEXAM: CT CHEST PULMONARY ANGIOGRAMCLINICAL INDICATION: PE suspected, 
high pretest prob COVID pna   COMPARISON:  None.TECHNIQUE:  Helical CT was 
performed and reconstructed at 1.25 mm slicethickness from lung bases to apices 
using 100 mL Omnipaque intravenouscontrast, without complication.   Axial MIPS 
and coronal/sagittal MPRS werereconstructed.   (DFOV = 30 cm)FINDINGS:PULMONARY 
ARTERIES:Enhancement is excellent. There is no filling defect within the 
pulmonaryarterial system.CHEST:Lower neck/thyroid: Unremarkable.Lungs: Diffuse 
groundglass/nodular opacities predominantly along theperiphery of both 
lungs.Central airway: Unremarkable.Pleura: No pleural effusion, thickening or 
pneumothorax.Thoracic aorta and great vessels:  Normal in diameter.Heart and 
pericardium: The heart is normal in size. . No pericardialeffusion.Lymph nodes: 
Enlarged left paratracheal node measuring 1.2 cm (4:27).Mediastinum: 
Unremarkable.Bones and soft tissues: No acute osseous abnormality. Soft tissues 
areunremarkable.Visualized upper abdomen: 
Unremarkable._______________________________IMPRESSIONNo acute pulmonary 
embolus.Diffuse groundglass/nodular opacities predominantly within the periphery
ofboth lungs, consistent with known Covid-19 
infection._______________________________I, Paul Murphy MD., have reviewed 
this study and agree with the abovereport.[facility]
LACTATE [id number] 19:40:00





             Test Item    Value        Reference Range Interpretation Comments

 

             LDH (test code = 0527308009) 706 U/L      300-600      H           

 

 

             Lab Interpretation (test code = Abnormal                           

    



             97722-8)                                            



[facility] PasoFERRITIN [id number] 18:54:00





             Test Item    Value        Reference Range Interpretation Comments

 

             FERRITIN (test code = 428.0 ng/mL  18.0-464.0                



             6952041784)                                         

 

             MISA (test code = MISA) Biotin has been                           



                          reported to cause a                           



                          negative bias,                           



                          interpret results                           



                          relative to                            



                          patient's use of                           



                          biotin.                                

 

             Lab Interpretation (test Normal                                 



             code = 29113-5)                                        



[facility] Paso[facility][id number] 18:31:00





             Test Item    Value        Reference    Interpretation Comments



                                       Range                     

 

             D-DIMER (test code = <0.27        See_Comment                [Autom

ated



             6054833935)                                         message] The



                                                                 system which



                                                                 generated this



                                                                 result



                                                                 transmitted



                                                                 reference range

:



                                                                 <0.41 ?g/mL



                                                                 (FEU). The



                                                                 reference range



                                                                 was not used to



                                                                 interpret this



                                                                 result as



                                                                 normal/abnormal

.

 

             MISA (test code = This test may be                           



             MISA)         used in conjunction                           



                          with a clinical                           



                          pretest probability                           



                          (PTP) assessment                           



                          model to exclude                           



                          venous                                 



                          thromboembolism                           



                          (VTE) in patients                           



                          suspected of deep                           



                          venous thrombosis                           



                          (DVT) and pulmonary                           



                          embolism (PE)  A                           



                          D-Dimer value less                           



                          than 0.50 ?g/ml                           



                          (FEU) has a negative                           



                          predicative value of                           



                          96 to 100% (95%                           



                          CI)and 97 to 100%                           



                          (95% CI) as an aid                           



                          in the diagnosis of                           



                          deep vein thrombosis                           



                          (DVT) and pulmonary                           



                          embolism when there                           



                          is low or moderate                           



                          pretest probability                           



                          of PE or DVT.                           



                          D-Dimer values are                           



                          expressed in initial                           



                          fibrinogen                             



                          equivalent units                           



                          (FEU)" The assay                           



                          results should be                           



                          used with other                           



                          information,                           



                          including the                           



                          clinical context, in                           



                          forming a diagnosis.                           



                                                                 

 

             Lab Interpretation Normal                                 



             (test code =                                        



             06881-1)                                            



[facility]CREATINE [id number] 18:18:00





             Test Item    Value        Reference Range Interpretation Comments

 

             CK (test code = 8060840531) 61 U/L                           

 

             Lab Interpretation (test code = Normal                             

    



             77257-5)                                            



[facility] with [id number] 17:21:00





             Test Item    Value        Reference Range Interpretation Comments

 

             WBC (test code =              See_Comment  H             [Automated



             5084-2)                                             message] The



                                                                 system which



                                                                 generated this



                                                                 result transmit

rodrick



                                                                 reference range

:



                                                                 4.20 - 10.70



                                                                 10*3/?L. The



                                                                 reference range



                                                                 was not used to



                                                                 interpret this



                                                                 result as



                                                                 normal/abnormal

.

 

             RBC (test code =              See_Comment  H             [Automated



             499-8)                                              message] The



                                                                 system which



                                                                 generated this



                                                                 result transmit

rodrick



                                                                 reference range

:



                                                                 4.26 - 5.52



                                                                 10*6/?L. The



                                                                 reference range



                                                                 was not used to



                                                                 interpret this



                                                                 result as



                                                                 normal/abnormal

.

 

             HGB (test code = 15.9 g/dL    12.2-16.4                 



             718-7)                                              

 

             HCT (test code = 48.1 %       38.4-49.3                 



             4544-3)                                             

 

             MCV (test code = 86.0 fL      81.7-95.6                 



             787-2)                                              

 

             MCH (test code = 28.4 pg      26.1-32.7                 



             785-6)                                              

 

             MCHC (test code = 33.1 g/dL    31.2-35.0                 



             786-4)                                              

 

             RDW-SD (test code = 43.2 fL      38.5-51.6                 



             18335-5)                                            

 

             RDW-CV (test code = 13.7 %       12.1-15.4                 



             788-0)                                              

 

             PLT (test code =              See_Comment                [Automated



             057-3)                                              message] The



                                                                 system which



                                                                 generated this



                                                                 result transmit

rodrick



                                                                 reference range

:



                                                                 150 - 328 10*3/

?L.



                                                                 The reference



                                                                 range was not u

sed



                                                                 to interpret th

is



                                                                 result as



                                                                 normal/abnormal

.

 

             MPV (test code = 9.9 fL       9.8-13.0                  



             12699-0)                                            

 

             NRBC/100 WBC (test              See_Comment                [Automat

ed



             code = 4547164790)                                        message] 

The



                                                                 system which



                                                                 generated this



                                                                 result transmit

rodrick



                                                                 reference range

:



                                                                 0.0 - 10.0 /100



                                                                 WBCs. The



                                                                 reference range



                                                                 was not used to



                                                                 interpret this



                                                                 result as



                                                                 normal/abnormal

.

 

             NRBC x10^3 (test code <0.01        See_Comment                [Auto

mated



             = [id number])                                        message] The



                                                                 system which



                                                                 generated this



                                                                 result transmit

rodrick



                                                                 reference range

:



                                                                 10*3/?L. The



                                                                 reference range



                                                                 was not used to



                                                                 interpret this



                                                                 result as



                                                                 normal/abnormal

.

 

             GRAN MAT (NEUT) % 92.7 %                                 



             (test code = 770-8)                                        

 

             IMM GRAN % (test code 1.10 %                                 



             = 2459638316)                                        

 

             LYMPH % (test code = 1.2 %                                  



             736-9)                                              

 

             MONO % (test code = 4.8 %                                  



             5905-5)                                             

 

             EOS % (test code = 0.0 %                                  



             713-8)                                              

 

             BASO % (test code = 0.2 %                                  



             706-2)                                              

 

             GRAN MAT x10^3(ANC) 21.85 10*3/uL 1.99-6.95    H            



             (test code =                                        



             7897256147)                                         

 

             IMM GRAN x10^3 (test 0.27 10*3/uL 0.00-0.06    H            



             code = 2953020535)                                        

 

             LYMPH x10^3 (test code 0.28 10*3/uL 1.09-3.23    L            



             = 731-0)                                            

 

             MONO x10^3 (test code 1.13 10*3/uL 0.36-1.02    H            



             = 742-7)                                            

 

             EOS x10^3 (test code = <0.03        0.06-0.53    L            



             711-2)                                              

 

             BASO x10^3 (test code 0.05 10*3/uL 0.01-0.09                 



             = 704-7)                                            

 

             Lab Interpretation Abnormal                               



             (test code = 26654-1)                                        



[facility] [id number] 16:55:00 Moderate 
multifocal pneumonia findings, apparently Covid 19.  EXAM: XR CHEST 1 VW 
COMPARISON: 2020 HISTORY: pneumonia  FINDINGS: Lungs: Decreased 
inspiratory effort comparison with the previous study.There are moderate hazy 
opacities in the mid to lower lungs, somewhatsimilar to the remote study, 
however increased. Heart/Mediastinum: The cardiomediastinal silhouette is normal
in sizeaccountingfor technique. Bones: No osseous lesions are detected. The soft
tissues appear normal [facility], Radiant Results Inft User - 2021 10:56 AM 
CSTEXAM: XR CHEST 1 VWCOMPARISON: 2020HISTORY: pneumonia FINDINGS:Lungs: 
Decreased inspiratory effort comparison with the previous study.There are 
moderate hazy opacities in the mid to lower lungs, somewhatsimilar to the remote
study, however increased.Heart/Mediastinum: The cardiomediastinal silhouette is 
normal in sizeaccounting for technique.Bones: No osseous lesions are detected. 
The soft tissues appear normalIMPRESSIONModerate multifocal pneumonia findings, 
apparently Covid 19.[facility] 
(ALB, T.PRO, BILI T, BU/BC, ALT, AST, ALK PHOS)2021 16:54:00





             Test Item    Value        Reference Range Interpretation Comments

 

             TOTAL BILI (test code = 5578606070) 1.2 mg/dL    0.1-1.1      H    

        

 

             BILI UNCON (test code = 5815103631) 0.6 mg/dL    0.1-1.1           

        

 

             BILI CONJ (test code = 9496007879) 0.0 mg/dL    0.0-0.3            

       

 

             T PROTEIN (test code = 2338690210) 7.8 g/dL     6.3-8.2            

       

 

             ALBUMIN (test code = 1764478320) 4.4 g/dL     3.5-5.0              

     

 

             ALK PHOS (test code = 2936802674) 132 U/L             H      

      

 

             ALTv (test code = 1742-6) 98 U/L       5-50         H            

 

             AST(SGOT) (test code = 1930081713) 59 U/L       13-40        H     

       

 

             Lab Interpretation (test code = Abnormal                           

    



             63191-2)                                            



[facility][id number] 16:49:00





             Test Item    Value        Reference Range Interpretation Comments

 

             TROPONIN I (test 0.001 ng/mL  See_Comment                [Automated



             code = 8440572686)                                        message] 

The



                                                                 system which



                                                                 generated this



                                                                 result



                                                                 transmitted



                                                                 reference range

:



                                                                 <=0.034. The



                                                                 reference range



                                                                 was not used to



                                                                 interpret this



                                                                 result as



                                                                 normal/abnormal

.

 

             MISA (test code = Equal or Less than                           



             MISA)         0.034                                  



                          ng/ml---Normal                           



                          ?Note: Cardiac                           



                          troponin begins to                           



                          rise 3-4 hours                           



                          after the onset of                           



                          ischemia. Repeat                           



                          in 4-6 hours if                           



                          the sample was                           



                          drawn within 3-4                           



                          hours of the onset                           



                          of the symptom and                           



                          found normal.                           



                          Between 0.035 and                           



                          0.120 ng/mL---                           



                          Borderline.                            



                          Questionable                           



                          myocardial injury                           



                          or necrosis ?                           



                          ?Note: Serial                           



                          measurement may be                           



                          necessary to                           



                          confirm or exclude                           



                          the diagnosis of                           



                          myocardial injury                           



                          or necrosis;                           



                          Clinical                               



                          correlation                            



                          (symptoms, EKGs,                           



                          imaging studies,                           



                          and others)                            



                          required; Repeat                           



                          in 4-6 hours if                           



                          clinically                             



                          indicated. ? ? ? ?                           



                          Equal or Higher                           



                          than 0.121                             



                          ng/mL---Abnormal.                           



                          Myocardial Injury                           



                          or Necrosis Likely                           



                          ? ? ? ?  Biotin                           



                          has been reported                           



                          to cause a                             



                          negative bias,                           



                          interpret results                           



                          relative to                            



                          patient's use of                           



                          biotin. ? ? ? ? ?                           



                          ? ? ? ? ? ? ? ? ?                           



                          ? ? ? ? ? ? ? ?  ?                           



                          ? ? ? ? ? ? ? ? ?                           



                          ? ? ? ? ? ? ? ? ?                           



                          ? ? ? ? ? ? ? ? ?                           

 

             Lab Interpretation Normal                                 



             (test code =                                        



             53408-6)                                            



[id number]46:00





             Test Item    Value        Reference Range Interpretation Comments

 

             NT-proBNP (test code 91 pg/mL     See_Comment                [Autom

ated



             = [id number])                                        message] The



                                                                 system which



                                                                 generated this



                                                                 result



                                                                 transmitted



                                                                 reference range

:



                                                                 <=125. The



                                                                 reference range



                                                                 was not used to



                                                                 interpret this



                                                                 result as



                                                                 normal/abnormal

.

 

             MISA (test code = MISA) Biotin has been                           



                          reported to                            



                          cause a negative                           



                          bias, interpret                           



                          results relative                           



                          to patient's use                           



                          of biotin.                             

 

             Lab Interpretation Normal                                 



             (test code = 68532-0)                                        



[facility]





             Test Item    Value        Reference Range Interpretation Comments

 

             APTT Patient (test              See_Comment                [Automat

ed



             code = 3173-2)                                        message] The



                                                                 system which



                                                                 generated this



                                                                 result



                                                                 transmitted



                                                                 reference range

:



                                                                 23 - 38 Seconds

.



                                                                 The reference



                                                                 range was not



                                                                 used to interpr

et



                                                                 this result as



                                                                 normal/abnormal

.

 

             MISA (test code = MISA) The [facility] patient                           



                          population mean                           



                          normal value for                           



                          aPTT is 30                             



                          seconds.                               

 

             Lab Interpretation Normal                                 



             (test code = 31080-5)                                        



[facility] / [id number] 16:40:00





             Test Item    Value        Reference Range Interpretation Comments

 

             PROTIME PATIENT (test              See_Comment                [Auto

mated message]



             code = 5964-2)                                        The system [employer]



                                                                 generated this 

result



                                                                 transmitted ref

erence



                                                                 range: 12.0 - 1

4.7



                                                                 Seconds. The re

ference



                                                                 range was not u

sed to



                                                                 interpret this 

result



                                                                 as normal/abnor

mal.

 

             INR (test code = 6301-6)                                        Nor

mal INR <1.1;



                                                                 Warfarin Therap

eutic



                                                                 range 2.0 to 3.

0 or



                                                                 2.5 to 3.5, dep

ending



                                                                 upon the indica

tions.

 

             Lab Interpretation (test Normal                                 



             code = 94025-8)                                        



[facility] Metabolic Panel (NA, K, CL, CO2, 
GLUCOSE, BUN, CREATININE, CA)2021 16:37:00





             Test Item    Value        Reference Range Interpretation Comments

 

             NA (test code = 136 mmol/L   135-145                   



             [id number])                                         

 

             K (test code = 4.0 mmol/L   3.5-5.0                   



             6144333587)                                         

 

             CL (test code = 99 mmol/L                        



             1371439374)                                         

 

             CO2 TOTAL (test code = 25 mmol/L    23-31                     



             0581601842)                                         

 

             AGAP (test code =              2-16                      



             0144722864)                                         

 

             BUN (test code = 22 mg/dL     7-23                      



             4788182302)                                         

 

             GLUCOSE (test code = 155 mg/dL           H            



             9019867559)                                         

 

             CREATININE (test code = 0.82 mg/dL   0.60-1.25                 



             9927082892)                                         

 

             CALCIUM (test code = 9.0 mg/dL    8.6-10.6                  



             3127390277)                                         

 

             eGFR Calculation              mL/min/1.73m2              



             (Non-)                                        



             (test code =                                        



             2165823012)                                         

 

             eGFR Calculation              mL/min/1.73m2              



             (African American)                                        



             (test code =                                        



             8878487701)                                         

 

             MISA (test code = MISA) Association of                           



                          Glomerular Filtration                           



                          Rate (GFR) and Staging                           



                          of Kidney Disease*                           



                          +---------------------                           



                          --+-------------------                           



                          --+-------------------                           



                          ------+| GFR                           



                          (mL/min/1.73 m2) ?|                           



                          With Kidney Damage ?|                           



                          ?Without Kidney                           



                          Damage+---------------                           



                          --------+-------------                           



                          --------+-------------                           



                          ------------+| ?>90 ?                           



                          ? ? ? ? ? ? ? ?|                           



                          ?Stage one ? ? ? ? ?|                           



                          ? Normal ? ? ? ? ? ? ?                           



                          ?+--------------------                           



                          ---+------------------                           



                          ---+------------------                           



                          -------+| ?60-89 ? ? ?                           



                          ? ? ? ? ?| ?Stage two                           



                          ? ? ? ? ?| ? Decreased                           



                          GFR ? ? ? ?                            



                          +---------------------                           



                          --+-------------------                           



                          --+-------------------                           



                          ------+| ?30-59 ? ? ?                           



                          ? ? ? ? ?| ?Stage                           



                          three ? ? ? ?| ? Stage                           



                          three ? ? ? ? ?                           



                          +---------------------                           



                          --+-------------------                           



                          --+-------------------                           



                          ------+| ?15-29 ? ? ?                           



                          ? ? ? ? ?| ?Stage four                           



                          ? ? ? ? | ? Stage four                           



                          ? ? ? ? ?                              



                          ?+--------------------                           



                          ---+------------------                           



                          ---+------------------                           



                          -------+| ?<15 (or                           



                          dialysis) ? ?| ?Stage                           



                          five ? ? ? ? | ? Stage                           



                          five ? ? ? ? ?                           



                          ?+--------------------                           



                          ---+------------------                           



                          ---+------------------                           



                          -------+ *Each stage                           



                          assumes the associated                           



                          GFR level has been in                           



                          effect for at least                           



                          three months. ?Stages                           



                          1 to 5, with or                           



                          without kidney                           



                          disease, indicate                           



                          chronic kidney                           



                          disease. Notes:                           



                          Determination of                           



                          stages one and two                           



                          (with eGFR                             



                          >59mL/min/1.73 m2)                           



                          requires estimation of                           



                          kidney damage for at                           



                          least three months as                           



                          defined by structural                           



                          or functional                           



                          abnormalities of the                           



                          kidney, manifested by                           



                          either:Pathological                           



                          abnormalities or                           



                          Markers of kidney                           



                          damage (including                           



                          abnormalities in the                           



                          composition of the                           



                          blood or urine or                           



                          abnormalities in                           



                          imaging tests).                           

 

             Lab Interpretation Abnormal                               



             (test code = 66426-7)                                        



[facility] 
16:37:00





             Test Item    Value        Reference Range Interpretation Comments

 

             SARS-CoV-2 Rapid ID NOW Positive     Not Detected A            



             (test code = 59141-9)                                        

 

             MISA (test code = MISA) ID NOW COVID-19 Assay                        

   



                          is an isothermal                           



                          nucleic acid                           



                          amplification test                           



                          intended for the                           



                          qualitative detection                           



                          of nucleic acid from                           



                          SARS-CoV-2 viral RNA                           



                          in nasopharyngeal (NP)                           



                          specimens. It is used                           



                          under Emergency Use                           



                          Authorization (EUA) by                           



                          FDA. The limit of                           



                          detection (LOD) of the                           



                          assay is 125 Genome                           



                          Equivalents/mL. A                           



                          positive result is                           



                          indicative of the                           



                          presence of SARS-CoV-2                           



                          RNA. ?Clinical                           



                          correlation with                           



                          patient history and                           



                          other diagnostic                           



                          information is                           



                          necessary to determine                           



                          patient infection                           



                          status. A negative                           



                          (Not Detected) result                           



                          does not preclude                           



                          SARS-CoV-2 infection.                           



                          In patients with                           



                          clinical symptoms and                           



                          other tests that are                           



                          consistent with                           



                          SARS-CoV-2 infection,                           



                          negative results                           



                          should be treated as                           



                          presumptive negative                           



                          and a new specimen                           



                          should be tested with                           



                          alternative PCR                           



                          molecular test.                           



                          Invalid: Please                           



                          collect a new specimen                           



                          for repeat patient                           



                          testing if clinically                           



                          indicated.                             

 

             Lab Interpretation Abnormal                               



             (test code = 60283-3)                                        



[facility]

## 2021-11-21 NOTE — RAD REPORT
EXAM DESCRIPTION:  RAD - Chest Single View - 11/21/2021 6:58 am

 

CLINICAL HISTORY:  wheezing;Congestion

 

COMPARISON:  August 2020

 

TECHNIQUE:  AP portable chest image was obtained 11/21/2021 6:58 am .

 

FINDINGS:  No mass or consolidations seen. Under penetrated technique accentuates bibasilar interstit
ial pattern which does appear to be increased from the comparison. Central vasculature within normal 
range. Heart size is normal. Trachea remains in the midline. No measurable pleural effusion and no pn
eumothorax. No acute bony abnormality seen. No acute aortic findings suspected.

 

IMPRESSION:  Bibasilar interstitial edema or infiltrate.

## 2021-11-21 NOTE — EDPHYS
Physician Documentation                                                                           

 [facility]                                                                 

Name: Ralph Yañez                                                                             

Age: 41 yrs                                                                                       

Sex: Male                                                                                         

: 1980                                                                                   

MRN: [id number]                                                                                   

Arrival Date: 2021                                                                          

Time: 04:44                                                                                       

Account#: [id number]                                                                            

Bed 7                                                                                             

Private MD:                                                                                       

ED Physician Lio Trotter                                                                         

HPI:                                                                                              

                                                                                             

05:00 This 41 yrs old Male presents to ER via Ambulatory with complaints of Asthma            mh7 

      Exacerbation.                                                                               

05:00 The patient presents to the emergency department with wheezing, Current therapy:        mh7 

      albuterol nebs, that began after exposure to animal dander, the patient was reported to     

      have audible wheezing, trouble breathing, Pre-hospital care: med neb, albuterol. Onset:     

      The symptoms/episode began/occurred 3 day(s) ago. Modifying factors: The symptoms are       

      alleviated by nothing, the symptoms are aggravated by animal dander. Associated signs       

      and symptoms: Pertinent negatives: chest pain, choking, fever, headache, nausea,            

      palpitations, rash, vomiting. Severity of symptoms: At their worst the symptoms were        

      moderate yesterday, in the emergency department the symptoms are unchanged. The patient     

      has experienced similar episodes in the past.                                               

                                                                                                  

Historical:                                                                                       

- Allergies:                                                                                      

04:59 Dilantin;                                                                               bb  

- Home Meds:                                                                                      

04:59 albuterol sulfate 0.63 mg/3 mL Inhl nebu [Active]; Flonase 50 mcg/actuation Nasal spsn  bb  

      1 spray 2 times per day [Active]; levothyroxine oral [Active]; Zyrtec 10 mg Oral chew 1     

      tab once daily [Active];                                                                    

- PMHx:                                                                                           

04:59 Asthma; hyperthyroidism; Pneumonia; seasonal allergies;                                 bb  

                                                                                                  

- Immunization history:: Adult Immunizations unknown, Client reports having NOT                   

  received the Covid vaccine.                                                                     

- Social history:: Smoking status: Patient denies any tobacco usage or history of.                

                                                                                                  

                                                                                                  

ROS:                                                                                              

05:00 Constitutional: Negative for fever, chills, and weight loss, Eyes: Negative for injury, mh7 

      pain, redness, and discharge, ENT: Negative for injury, pain, and discharge, Neck:          

      Negative for injury, pain, and swelling, Cardiovascular: Negative for chest pain,           

      palpitations, and edema, Abdomen/GI: Negative for abdominal pain, nausea, vomiting,         

      diarrhea, and constipation, Back: Negative for injury and pain, : Negative for            

      injury, bleeding, discharge, and swelling, MS/Extremity: Negative for injury and            

      deformity, Skin: Negative for injury, rash, and discoloration, Neuro: Negative for          

      headache, weakness, numbness, tingling, and seizure, Psych: Negative for depression,        

      anxiety, suicide ideation, homicidal ideation, and hallucinations, Allergy/Immunology:      

      Negative for hives, rash, and allergies, Endocrine: Negative for neck swelling,             

      polydipsia, polyuria, polyphagia, and marked weight changes, Hematologic/Lymphatic:         

      Negative for swollen nodes, abnormal bleeding, and unusual bruising.                        

                                                                                                  

Exam:                                                                                             

05:00 Constitutional:  This is a well developed, well nourished patient who is awake, alert,  mh7 

      and in no acute distress. Head/Face:  Normocephalic, atraumatic. Eyes:  Pupils equal        

      round and reactive to light, extra-ocular motions intact.  Lids and lashes normal.          

      Conjunctiva and sclera are non-icteric and not injected.  Cornea within normal limits.      

      Periorbital areas with no swelling, redness, or edema. Neck:  Trachea midline, no           

      thyromegaly or masses palpated, and no cervical lymphadenopathy.  Supple, full range of     

      motion without nuchal rigidity, or vertebral point tenderness.  No Meningismus.             

      Chest/axilla:  Normal chest wall appearance and motion.  Nontender with no deformity.       

      No lesions are appreciated. Cardiovascular:  Regular rate and rhythm with a normal S1       

      and S2.  No gallops, murmurs, or rubs.  Normal PMI, no JVD.  No pulse deficits.             

05:00 Abdomen/GI:  Soft, non-tender, with normal bowel sounds.  No distension or tympany.  No     

      guarding or rebound.  No evidence of tenderness throughout. Back:  No spinal                

      tenderness.  No costovertebral tenderness.  Full range of motion. Skin:  Warm, dry with     

      normal turgor.  Normal color with no rashes, no lesions, and no evidence of cellulitis.     

      MS/ Extremity:  Pulses equal, no cyanosis.  Neurovascular intact.  Full, normal range       

      of motion. Neuro:  Awake and alert, GCS 15, oriented to person, place, time, and            

      situation.  Cranial nerves II-XII grossly intact.  Motor strength 5/5 in all                

      extremities.  Sensory grossly intact.  Cerebellar exam normal.  Normal gait. Psych:         

      Awake, alert, with orientation to person, place and time.  Behavior, mood, and affect       

      are within normal limits.                                                                   

05:00 Constitutional: The patient appears                                                         

05:00 Respiratory: the patient does not display signs of respiratory distress,  Respirations:     

      prolonged exhalation, that is moderate, Breath sounds: wheezing: expiratory that is         

      moderate, is heard diffusely, Respiratory rate:  20                                         

07:51 ECG was reviewed by the Attending Physician.                                            rn  

                                                                                                  

Vital Signs:                                                                                      

04:55  / 79; Pulse 76; Resp 20; Temp 98.5(O); Pulse Ox 95% on R/A; Weight 88.45 kg (R); bb  

      Height 5 ft. 7 in. (170.18 cm) (R); Pain 0/10;                                              

06:55  / 86; Pulse 81; Resp 15; Pulse Ox 92% on R/A;                                    em  

07:15  / 79; Pulse 68; Resp 16 S; Pulse Ox 95% on R/A;                                  aa5 

08:00  / 65; Pulse 69; Resp 18 S; Pulse Ox 96% on R/A;                                  aa5 

09:02 Pulse Ox 96% ;                                                                          rn  

09:30  / 67; Pulse 80; Resp 18 S; Temp 98.6(TE); Pulse Ox 95% on R/A;                   aa5 

10:30  / 74; Pulse 80; Resp 16 S; Pulse Ox 96% on R/A;                                  aa5 

04:55 Body Mass Index 30.54 (88.45 kg, 170.18 cm)                                             bb  

                                                                                                  

MDM:                                                                                              

07:07 Patient medically screened.                                                             rn  

09:02 Differential diagnosis: acute asthma, URI, pneumonia, viral syndrome, COVID. Data       rn  

      reviewed: vital signs, nurses notes, lab test result(s), radiologic studies, plain          

      films, and as a result, I will discharge patient. Data interpreted: Cardiac monitor:        

      rate is 81 beats/min, rhythm is normal sinus rhythm, regular, with no ectopy,               

      Interpretation: normal rate, normal rhythm, Pulse oximetry: on room air is 96 %.            

      Interpretation: acceptable. Counseling: I had a detailed discussion with the patient        

      and/or guardian regarding: the historical points, exam findings, and any diagnostic         

      results supporting the discharge/admit diagnosis, lab results, radiology results, the       

      need for outpatient follow up, to return to the emergency department if symptoms worsen     

      or persist or if there are any questions or concerns that arise at home. Response to        

      treatment: the patient's symptoms have markedly improved after treatment, and as a          

      result, I will discharge patient. Special discussion: I discussed with the                  

      patient/guardian in detail that at this point there is no indication for admission to       

      the hospital. It is understood, however, that if the symptoms persist or worsen the         

      patient needs to return immediately for re-evaluation. Based on the history and exam        

      findings, there is no indication for further emergent testing or inpatient evaluation.      

      I discussed with the patient/guardian the need to see the primary care provider for         

      further evaluation of the symptoms. ED course: Patient feels much better. Oxygen 95 to      

      96% now. No tachypnea. Wheezing resolved. Awaiting Zithromax dosing for possible            

      pneumonia. Patient refuses Covid testing. Patient states feels well and wants to go         

      home. Offered observation in the hospital, patient declined. Will DC home with              

      antibiotics and steroids and return precautions..                                           

                                                                                                  

                                                                                             

07:02 Order name: Basic Metabolic Panel; Complete Time: 08:34                                 7 

                                                                                             

07:02 Order name: CBC with Diff; Complete Time: 19:11                                         7 

                                                                                             

07:02 Order name: LFT's; Complete Time: 08:34                                                 7 

                                                                                             

07:02 Order name: Magnesium; Complete Time: 08:34                                             7 

                                                                                             

06:09 Order name: Chest Single View XRAY; Complete Time: 08:34                                7 

                                                                                             

07:02 Order name: NT PRO-BNP; Complete Time: 08:34                                            7 

                                                                                             

07:02 Order name: PT-INR; Complete Time: 19:11                                                7 

                                                                                             

07:02 Order name: Troponin (emerg Dept Use Only); Complete Time: 08:34                        7 

                                                                                             

07:03 Order name: Blood Culture Adult (2)                                                     [facility] 

                                                                                             

09:45 Order name: Manual Differential; Complete Time: 19:11                                   EDMS

                                                                                             

04:59 Order name: Saline Lock; Complete Time: 05:33                                           mh7 

                                                                                             

07:02 Order name: EKG; Complete Time: 07:03                                                   7 

                                                                                             

07:02 Order name: Cardiac monitoring; Complete Time: 07:13                                    7 

                                                                                             

07:02 Order name: EKG - Nurse/Tech; Complete Time: 07:29                                      7 

                                                                                             

07:02 Order name: Labs collected and sent; Complete Time: 07:29                               7 

                                                                                             

07:02 Order name: O2 Per Protocol; Complete Time: 07:13                                       mh7 

                                                                                             

07:02 Order name: O2 Sat Monitoring; Complete Time: 07:13                                     mh7 

                                                                                                  

EC:51 Rate is 70 beats/min. Rhythm is regular. QRS Axis is Normal. CO interval is normal. QRS rn  

      interval is normal. QT interval is normal. No Q waves. T waves are Normal. No ST            

      changes noted. Clinical impression: Normal ECG. Interpreted by me. Reviewed by me.          

                                                                                                  

Administered Medications:                                                                         

05:15 Drug: Albuterol - atroVENT (ipratropium) (3:1) (2.5 mg - 0.5 mg) 3 ml Route: Nebulizer; em  

05:45 Follow up: Response: No adverse reaction; Marked relief of symptoms; Wheezing diminishedem  

05:33 Drug: SOLU-Medrol (methylPrednisoLONE) 125 mg Route: IVP; Site: right antecubital;      em  

06:28 Follow up: Response: No adverse reaction                                                em  

07:22 Drug: Rocephin (cefTRIAXone) 1 grams Route: IV; Rate: per protocol; Site: right         aa5 

      antecubital;                                                                                

07:30 Follow up: Response: No adverse reaction                                                aa5 

09:00 Drug: AZITHromycin 500 mg Route: IVPB; Infused Over: 1 hrs; Site: right antecubital;    aa5 

10:00 Follow up: Response: No adverse reaction; IV Status: Completed infusion                 aa5 

                                                                                                  

                                                                                                  

Disposition Summary:                                                                              

21 09:06                                                                                    

Discharge Ordered                                                                                 

      Location: Home                                                                          rn  

      Problem: an acute exacerbation                                                          rn  

      Symptoms: have improved                                                                 rn  

      Condition: Stable                                                                       rn  

      Diagnosis                                                                                   

        - Pneumonia, unspecified organism                                                     rn  

        - Moderate persistent asthma with (acute) exacerbation                                rn  

      Followup:                                                                               rn  

        - With: Private Physician                                                                  

        - When: 2 - 3 days                                                                         

        - Reason: Recheck today's complaints, Re-evaluation by your physician                      

      Discharge Instructions:                                                                     

        - Discharge Summary Sheet                                                             rn  

        - Asthma, Adult                                                                       rn  

        - Community-Acquired Pneumonia, Adult                                                 rn  

      Forms:                                                                                      

        - Medication Reconciliation Form                                                      rn  

        - Thank You Letter                                                                    rn  

        - Antibiotic Education                                                                rn  

        - Prescription Opioid Use                                                             rn  

      Prescriptions:                                                                              

        - Prednisone 20 mg Oral Tablet                                                             

            - take 3 tablets by ORAL route once daily for 5 days; 15 tablet; Refills: 0,      rn  

      Product Selection Permitted                                                                 

        - levofloxacin 500 mg Oral Tablet                                                          

            - take 1 tablet by ORAL route once daily for 7 days; 7 tablet; Refills: 0,        rn  

      Product Selection Permitted                                                                 

Signatures:                                                                                       

Dispatcher [employer]                           Faizan Cortes RN                        RN   Celestina Rios RN                     RN   Lio Hamilton MD MD rn Calderon, Audri, RN RN   aa5                                                  

Wally Kapadia MD MD   7                                                  

                                                                                                  

Corrections: (The following items were deleted from the chart)                                    

:10 06:09 COVID-19/FLU A+B ordered. DENAE                                                    EDMS

                                                                                                  

**************************************************************************************************

## 2021-11-21 NOTE — ER
Nurse's Notes                                                                                     

 [facility]                                                                 

Name: Ralph Yañez                                                                             

Age: 41 yrs                                                                                       

Sex: Male                                                                                         

: 1980                                                                                   

MRN: [id number]                                                                                   

Arrival Date: 2021                                                                          

Time: 04:44                                                                                       

Account#: [id number]                                                                            

Bed 7                                                                                             

Private MD:                                                                                       

Diagnosis: Pneumonia, unspecified organism;Moderate persistent asthma with (acute) exacerbation   

                                                                                                  

Presentation:                                                                                     

                                                                                             

04:55 Chief complaint: Patient states: he has been having difficulty breathing since Thursday bb  

      saw his PCP who gave him amoxicillin for an ear infection but his symptoms are getting      

      worse. Coronavirus screen: difficulty breathing. Ebola Screen: No symptoms or risks         

      identified at this time. Initial Sepsis Screen: Does the patient meet any 2 criteria?       

      No. Patient's initial sepsis screen is negative. Does the patient have a suspected          

      source of infection? No. Patient's initial sepsis screen is negative. Risk Assessment:      

      Do you want to hurt yourself or someone else? Patient reports no desire to harm self or     

      others. Onset of symptoms was 2021.                                            

04:55 Method Of Arrival: Ambulatory                                                           bb  

04:55 Acuity: JACEY 3                                                                           bb  

                                                                                                  

Historical:                                                                                       

- Allergies:                                                                                      

04:59 Dilantin;                                                                               bb  

- Home Meds:                                                                                      

04:59 albuterol sulfate 0.63 mg/3 mL Inhl nebu [Active]; Flonase 50 mcg/actuation Nasal spsn  bb  

      1 spray 2 times per day [Active]; levothyroxine oral [Active]; Zyrtec 10 mg Oral chew 1     

      tab once daily [Active];                                                                    

- PMHx:                                                                                           

04:59 Asthma; hyperthyroidism; Pneumonia; seasonal allergies;                                 bb  

                                                                                                  

- Immunization history:: Adult Immunizations unknown, Client reports having NOT                   

  received the Covid vaccine.                                                                     

- Social history:: Smoking status: Patient denies any tobacco usage or history of.                

                                                                                                  

                                                                                                  

Screenin:15 Abuse screen: Denies threats or abuse. Nutritional screening: No deficits noted.        em  

      Tuberculosis screening: No symptoms or risk factors identified. Fall Risk None              

      identified.                                                                                 

                                                                                                  

Assessment:                                                                                       

05:15 General: Appears in no apparent distress. uncomfortable, Behavior is calm, cooperative, em  

      appropriate for age, Denies fever. Pain: Denies pain. Neuro: Level of Consciousness is      

      awake, alert, obeys commands, Oriented to person, place, time, situation.                   

      Cardiovascular: Capillary refill < 3 seconds Patient's skin is warm and dry. Rhythm is      

      regular. Respiratory: Reports shortness of breath cough that is Airway is patent            

      Respiratory effort is even, unlabored, Respiratory pattern is regular, symmetrical,         

      Breath sounds with wheezes bilaterally. Derm: Skin is intact, is healthy with good          

      turgor, Skin is pink, warm \T\ dry. Musculoskeletal: Capillary refill < 3 seconds, Range    

      of motion: intact in all extremities.                                                       

06:27 Reassessment: pt refused covid swab, Dr. Kapadia notified.                               em  

07:10 Reassessment: Patient is alert, oriented x 3, equal unlabored respirations, skin        aa5 

      warm/dry/pink. Patient states feeling better. Patient states symptoms have improved.        

08:12 Reassessment: Awaiting azithromycin from pharmacy, contacted pharmacy again and they    aa5 

      stated they will bring medication. .                                                        

09:00 Reassessment: Patient is alert, oriented x 3, equal unlabored respirations, skin        aa5 

      warm/dry/pink.                                                                              

11:00 Reassessment: Patient is alert, oriented x 3, equal unlabored respirations, skin        aa5 

      warm/dry/pink.                                                                              

                                                                                                  

Vital Signs:                                                                                      

04:55  / 79; Pulse 76; Resp 20; Temp 98.5(O); Pulse Ox 95% on R/A; Weight 88.45 kg (R); bb  

      Height 5 ft. 7 in. (170.18 cm) (R); Pain 0/10;                                              

06:55  / 86; Pulse 81; Resp 15; Pulse Ox 92% on R/A;                                    em  

07:15  / 79; Pulse 68; Resp 16 S; Pulse Ox 95% on R/A;                                  aa5 

08:00  / 65; Pulse 69; Resp 18 S; Pulse Ox 96% on R/A;                                  aa5 

09:02 Pulse Ox 96% ;                                                                          rn  

09:30  / 67; Pulse 80; Resp 18 S; Temp 98.6(TE); Pulse Ox 95% on R/A;                   aa5 

10:30  / 74; Pulse 80; Resp 16 S; Pulse Ox 96% on R/A;                                  aa5 

04:55 Body Mass Index 30.54 (88.45 kg, 170.18 cm)                                               

                                                                                                  

ED Course:                                                                                        

04:44 Patient arrived in ED.                                                                    

04:59 Wally Kapadia MD is Attending Physician.                                             7 

04:59 Triage completed.                                                                       bb  

04:59 Arm band placed on.                                                                     bb  

05:14 Faizan Chung, RN is Primary Nurse.                                                      em  

05:15 Patient has correct armband on for positive identification.                             em  

05:33 Inserted saline lock: 22 gauge in right antecubital area, using aseptic technique.      em  

06:58 Chest Single View XRAY In Process Unspecified.                                          EDMS

07:07 Attending Physician role handed off by Wally Kapadia MD                              rn  

07:07 iLo Trotter MD is Attending Physician.                                                rn  

07:10 Cardiac monitor on. Pulse ox on. NIBP on.                                               aa5 

07:10 Initial lab(s) drawn, by me, sent to lab. First set of blood cultures drawn by me.      aa5 

07:20 Second set of blood cultures drawn by me.                                               aa5 

07:30 EKG done, by ED staff, reviewed by Lio Trotter MD.                                      3 

11:00 No provider procedures requiring assistance completed. IV discontinued, intact,         aa5 

      bleeding controlled, No redness/swelling at site. Pressure dressing applied.                

                                                                                                  

Administered Medications:                                                                         

05:15 Drug: Albuterol - atroVENT (ipratropium) (3:1) (2.5 mg - 0.5 mg) 3 ml Route: Nebulizer; em  

05:45 Follow up: Response: No adverse reaction; Marked relief of symptoms; Wheezing diminishedem  

05:33 Drug: SOLU-Medrol (methylPrednisoLONE) 125 mg Route: IVP; Site: right antecubital;      em  

06:28 Follow up: Response: No adverse reaction                                                em  

07:22 Drug: Rocephin (cefTRIAXone) 1 grams Route: IV; Rate: per protocol; Site: right         aa5 

      antecubital;                                                                                

07:30 Follow up: Response: No adverse reaction                                                aa5 

09:00 Drug: AZITHromycin 500 mg Route: IVPB; Infused Over: 1 hrs; Site: right antecubital;    aa5 

10:00 Follow up: Response: No adverse reaction; IV Status: Completed infusion                 aa5 

                                                                                                  

                                                                                                  

Outcome:                                                                                          

09:06 Discharge ordered by MD.                                                                rn  

11:00 Discharged to home ambulatory.                                                          aa5 

11:00 Condition: improved                                                                         

11:00 Discharge instructions given to patient, Instructed on discharge instructions, follow       

      up and referral plans. medication usage, Demonstrated understanding of instructions,        

      follow-up care, medications, Prescriptions given X 2.                                       

11:04 Patient left the ED.                                                                    aa5 

                                                                                                  

Signatures:                                                                                       

Dispatcher [employer]                           Faizan Cortes, Celestina Wagner RN, RN                     RN   bb                                                   

Lio Trotter MD MD rn Calderon, Audri, RN RN   5                                                  

Flory De Souza                              [facility]                                                  

Wally Kapadia MD MD   [facility]                                                  

Vicki Restrepo                                                                                    

                                                                                                  

**************************************************************************************************

## 2021-11-24 NOTE — EKG
Test Date:    2021-11-21               Test Time:    07:27:24

Technician:   REYNA                                     

                                                     

MEASUREMENT RESULTS:                                       

Intervals:                                           

Rate:         70                                     

SD:           128                                    

QRSD:         80                                     

QT:           392                                    

QTc:          423                                    

Axis:                                                

P:            39                                     

SD:           128                                    

QRS:          78                                     

T:            78                                     

                                                     

INTERPRETIVE STATEMENTS:                                       

                                                     

Normal sinus rhythm

Normal ECG

Compared to ECG 02/27/2017 11:10:35

Sinus tachycardia no longer present



Electronically Signed On 11-24-21 08:04:21 CST by [place]

## 2022-01-22 ENCOUNTER — HOSPITAL ENCOUNTER (EMERGENCY)
Dept: HOSPITAL 97 - ER | Age: 42
Discharge: HOME | End: 2022-01-22
Payer: COMMERCIAL

## 2022-01-22 VITALS — SYSTOLIC BLOOD PRESSURE: 139 MMHG | DIASTOLIC BLOOD PRESSURE: 86 MMHG

## 2022-01-22 VITALS — OXYGEN SATURATION: 98 % | TEMPERATURE: 97 F

## 2022-01-22 DIAGNOSIS — Z88.8: ICD-10-CM

## 2022-01-22 DIAGNOSIS — J45.901: Primary | ICD-10-CM

## 2022-01-22 DIAGNOSIS — Z20.822: ICD-10-CM

## 2022-01-22 LAB — SARS-COV-2 RNA RESP QL NAA+PROBE: NEGATIVE

## 2022-01-22 PROCEDURE — 94640 AIRWAY INHALATION TREATMENT: CPT

## 2022-01-22 PROCEDURE — 99284 EMERGENCY DEPT VISIT MOD MDM: CPT

## 2022-01-22 PROCEDURE — 0240U: CPT

## 2022-01-22 NOTE — ER
Nurse's Notes                                                                                     

 [facility]                                                                 

Name: Ralph Yañez                                                                             

Age: 41 yrs                                                                                       

Sex: Male                                                                                         

: 1980                                                                                   

MRN: [id number]                                                                                   

Arrival Date: 2022                                                                          

Time: 01:28                                                                                       

Account#: [id number]                                                                            

Bed 10                                                                                            

Private MD:                                                                                       

Diagnosis: Unspecified asthma with (acute) exacerbation                                           

                                                                                                  

Presentation:                                                                                     

                                                                                             

01:33 Chief complaint: Patient states: "I'm having an asthma flair up". Coronavirus screen:   as6 

      At this time, the client does not indicate any symptoms associated with coronavirus-19.     

      Ebola Screen: No symptoms or risks identified at this time. Initial Sepsis Screen: Does     

      the patient meet any 2 criteria? No. Patient's initial sepsis screen is negative. Does      

      the patient have a suspected source of infection? No. Patient's initial sepsis screen       

      is negative. Risk Assessment: Do you want to hurt yourself or someone else? Patient         

      reports no desire to harm self or others. Onset of symptoms was January 15, 2022.           

01:33 Method Of Arrival: Ambulatory                                                           as6 

01:33 Acuity: JACEY 3                                                                           as6 

                                                                                                  

Triage Assessment:                                                                                

01:36 General: Appears in no apparent distress. Behavior is calm, cooperative. Pain: Denies   as6 

      pain. Respiratory: Reports shortness of breath Airway is patent Trachea midline             

      Respiratory effort is even, unlabored, Respiratory pattern is regular, symmetrical,         

      Onset: The symptoms/episode began/occurred gradually, the patient has mild shortness of     

      breath.                                                                                     

                                                                                                  

Historical:                                                                                       

- Allergies:                                                                                      

01:35 Dilantin;                                                                               as6 

- Home Meds:                                                                                      

01:35 albuterol sulfate 0.63 mg/3 mL Inhl nebu [Active]; Flonase 50 mcg/actuation Nasal spsn  as6 

      1 spray 2 times per day [Active]; levothyroxine oral [Active]; Zyrtec 10 mg Oral chew 1     

      tab once daily [Active];                                                                    

- PMHx:                                                                                           

01:35 Asthma; hyperthyroidism; Pneumonia; seasonal allergies;                                 as6 

- PSHx:                                                                                           

01:35 Thyroidectomy;                                                                          as6 

                                                                                                  

- Immunization history:: Client reports having NOT received the Covid vaccine.                    

- Social history:: Smoking status: Patient denies any tobacco usage or history of.                

                                                                                                  

                                                                                                  

Screenin:17 Abuse screen: Denies threats or abuse. Denies injuries from another. Nutritional        tw5 

      screening: No deficits noted. Tuberculosis screening: No symptoms or risk factors           

      identified. Fall Risk None identified.                                                      

                                                                                                  

Assessment:                                                                                       

02:53 General: Appears in no apparent distress. Behavior is calm, cooperative. Neuro: Level   as6 

      of Consciousness is awake, alert, obeys commands, Oriented to person, place, time,          

      situation. Cardiovascular: Capillary refill < 3 seconds Patient's skin is warm and dry.     

      Rhythm is regular. Respiratory: Airway is patent Trachea midline Respiratory effort is      

      even, unlabored, Respiratory pattern is regular, symmetrical, Breath sounds with            

      wheezes bilaterally. Derm: Skin is intact, is healthy with good turgor.                     

04:17 Reassessment: Patient states feeling better. Patient states symptoms have improved.     tw5 

      General: Reports "I am feeling a little better.".                                           

                                                                                                  

Vital Signs:                                                                                      

01:33  / 90; Pulse 76; Resp 22 S; Temp 97.0(TE); Pulse Ox 98% on R/A; Weight 88.45 kg   as6 

      (R); Height 5 ft. 9 in. (175.26 cm) (R); Pain 0/10;                                         

04:17  / 86; Pulse 80; Resp 18; Pulse Ox 98% on R/A;                                    as6 

01:33 Body Mass Index 28.80 (88.45 kg, 175.26 cm)                                             as6 

                                                                                                  

ED Course:                                                                                        

01:28 Patient arrived in ED.                                                                  ja2 

01:35 Triage completed.                                                                       as6 

01:36 Arm band placed on.                                                                     as6 

01:50 Wally Kapadia MD is Attending Physician.                                             mh7 

02:28 Clive Roy, RN is Primary Nurse.                                                    as6 

02:40 COVID-19/FLU A+B (Document "Date of Onset" if Symptomatic) Sent.                        as6 

04:17 Patient has correct armband on for positive identification.                             tw5 

04:17 No provider procedures requiring assistance completed. Patient did not have IV access   tw5 

      during this emergency room visit.                                                           

                                                                                                  

Administered Medications:                                                                         

02:40 Drug: Albuterol - atroVENT (ipratropium) (3:1) (2.5 mg - 0.5 mg) 3 ml Route: Nebulizer; as6 

04:17 Follow up: Response: No adverse reaction; Wheezing diminished                           as6 

02:40 Drug: predniSONE 60 mg Route: PO;                                                       as6 

04:17 Follow up: Response: No adverse reaction                                                as6 

                                                                                                  

                                                                                                  

Outcome:                                                                                          

04:16 Discharge ordered by MD.                                                                mh7 

04:21 Discharged to home ambulatory.                                                          tw5 

04:21 Condition: improved                                                                         

04:21 Discharge instructions given to patient, Instructed on discharge instructions, follow       

      up and referral plans. medication usage, Demonstrated understanding of instructions,        

      follow-up care, medications, Prescriptions given X 3.                                       

04:21 Patient left the ED.                                                                    tw5 

                                                                                                  

Signatures:                                                                                       

Wally Kapadia MD MD   mh7                                                  

Felicia Arnett Tiffany                                tw5                                                  

Clive Roy, RN                      RN   as6                                                  

                                                                                                  

**************************************************************************************************

## 2022-01-22 NOTE — XMS REPORT
Continuity of Care Document

                           Created on:2022



Patient:JERMAINE ZIEGLER

Sex:Male

:1980

External Reference #:[id number]





Demographics







                          Address                   [address]



                                                    [place] [address]

 

                          Home Phone                [phone]

 

                          Mobile Phone              [phone]

 

                          Email Address             [email or web address]

 

                          Preferred Language        English

 

                          Marital Status            Unknown

 

                          Adventist Affiliation     Unknown

 

                          Race                      Unknown

 

                          Additional Race(s)        White



                                                    Unavailable

 

                          Ethnic Group              Unknown









Author







                          Organization              [facility]

t

 

                          Address                   [address] [place] [address] [address]



                                                    [place] [address]

 

                          Phone                     [phone]









Support







                Name            Relationship    Address         Phone

 

                Otilio        Spouse          [address]      [phone]



                                                [place] [address] 

 

                Renaldo Ziegler Spouse          [address]  [phone]



                                                [place] [address] 

 

                RENALDO ZIEGLER X               [address]  Unavailable



                                                [place] [address] 









Care Team Providers







                    Name                Role                Phone

 

                    MARIBEL               Primary Care Physician Unavailable

 

                    PASCALE             Attending Clinician Unavailable

 

                    Pascale MALIK         Attending Clinician [phone]

 

                    Doctor Unassigned,  Name Attending Clinician Unavailable

 

                    Beni Smith DO   Attending Clinician [phone]

 

                    Dayron MATTSON  E        Attending Clinician [phone]

 

                    Steve               Attending Clinician [phone]

 

                    Migue CHAVEZ           Attending Clinician [phone]

 

                    Chanel CHAVEZ         Attending Clinician [phone]

 

                    RADIOLOGY           Attending Clinician Unavailable

 

                    Jacky CHAVEZ              Attending Clinician [phone]

 

                    PASCALE             Admitting Clinician Unavailable

 

                    Chanel CHAVEZ         Admitting Clinician [phone]









Payers







           Payer Name Policy Type Policy Number Effective Date Expiration Date S

ource

 

           HIM AMBETTER FROM            [id number] 2021            



           [facility]                       00:00:00              







Problems







       Condition Condition Condition Status Onset  Resolution Last   Treating Co

mments 

Source



       Name   Details Category        Date   Date   Treatment Clinician        



                                                 Date                 

 

       Lower  Lower  Disease Active                              [facility]



       respirator respirator               3-                               it

y of



       y tract y tract               00:00:                             [place]



       infection infection               00                                 Medi

yeni



       due to due to                                                  [facility]



       COVID-19 COVID-19                                                  



       virus  virus                                                   

 

       Postsurgic Postsurgic Disease Active                              U

nivers



       al     al                   5-                               ity of



       hypothyroi hypothyroi               00:00:                             

[place]



       dism   dism                 00                                 Medical



                                                                      [place]

 

       S/P    S/P    Disease Active                              [facility]



       thyroidect thyroidect               4-21                               it

y of



       ramonita    ramonita                  00:00:                             [place]



                                   00                                 Medical



                                                                      [facility]

 

       Hyperthyro Hyperthyro Disease Active 2016                             U

justin



       idism  idism                1-29                               ity of



                                   00:00:                             [place]



                                   00                                 Medical



                                                                      [facility]







Allergies, Adverse Reactions, Alerts







       Allergy Allergy Status Severity Reaction(s) Onset  Inactive Treating Comm

ents 

Source



       Name   Type                        Date   Date   Clinician        

 

       PHENYTOI DRUG   Active        Hives                        [facility]



       N SODIUM INGREDI                      7-12                        ity of



       EXTENDED                             00:00:                      [place]



                                          00                          Medical



                                                                      [facility]

 

       Phenytoi Propensi Active        Hives                        Univer

s



       n Sodium ty to                       7-12                        ity of



       Extended adverse                      00:00:                      [place]



              reaction                      00                          Medical



              s                                                       [place]







Social History







           Social Habit Start Date Stop Date  Quantity   Comments   Source

 

           Exposure to                       Not sure              [facility]

 [place]



           SARS-CoV-2 (event)                                             [facility]

l [place]

 

           Alcohol intake 2021 0 /d                  [facility]

 [facility] [place]



                      00:00:00   00:00:00                         [facility]

 

           Tobacco use and 2016 Never used            [facility]



           exposure   00:00:00   00:00:00                         [facility]

 

           Sex Assigned At 1980                       [facility]



           Birth      00:00:00   00:00:00                         [facility]









                Smoking Status  Start Date      Stop Date       Source

 

                Never smoker                                    [facility]







Medications







       Ordered Filled Start  Stop   Current Ordering Indication Dosage Frequency

 Signature

                    Comments            Components          Source



     Medication Medication Date Date Medication? Clinician                (SIG) 

          



     Name Name                                                   

 

     dexamethaso      2021- Yes       [id number] 10mg      10 mg,         

  [facility]



     ne        19 -19                          Intramuscu           ity of



     (DECADRON      01:30: 01:30                          lar, ONCE,           T

exas



     PHOSPHATE)      00   :00                           1 dose, On           Med

ical



     injection                                         Sat            Branch



     10 mg                                         21           



                                                  at 1930,           



                                                  STAT           

 

     predniSONE      2021- Yes       [id number] 40mg      Take 2          

 [facility]



     20 mg      2-18 12-24                          tablets by           ity of



     tablet      00:00: 05:59                          mouth           [place]



               00   :00                           daily for           Medical



                                                  5 days.           [facility]

 

     methylPREDN            Yes       [id number]           Take  by         

  [facility]



     ISolone 4      3-09                               mouth           ity of



     mg tablets      00:00:                               SEE-INSTRU           T

exas



               00                                 CTIONS.           Medical



                                                  follow           Branch



                                                  package           



                                                  directions           

 

     methylPREDN      2021-0      Yes       [id number]           Take  by         

  [facility]



     ISolone 4      3-09                               mouth           ity of



     mg tablets      00:00:                               SEE-INSTRU           T

exas



               00                                 CTIONS.           Medical



                                                  follow           Branch



                                                  package           



                                                  directions           

 

     methylPREDN      2021-0      Yes       [id number]           Take  by         

  [facility]



     ISolone 4      3-09                               mouth           ity of



     mg tablets      00:00:                               SEE-INSTRU           T

exas



               00                                 CTIONS.           Medical



                                                  follow           Branch



                                                  package           



                                                  directions           

 

     methylPREDN      2021-0      Yes       [id number]           Take  by         

  [facility]



     ISolone 4      3-09                               mouth           ity of



     mg tablets      00:00:                               SEE-INSTRU           T

exas



               00                                 CTIONS.           Medical



                                                  follow           Branch



                                                  package           



                                                  directions           

 

     methylPREDN      2021-0      Yes       [id number]           Take  by         

  [facility]



     ISolone 4      3-09                               mouth           ity of



     mg tablets      00:00:                               SEE-INSTRU           T

exas



               00                                 CTIONS.           Medical



                                                  follow           Branch



                                                  package           



                                                  directions           

 

     methylPREDN      2021-0      Yes       [id number]           Take  by         

  [facility]



     ISolone 4      3-09                               mouth           ity of



     mg tablets      00:00:                               SEE-INSTRU           T

exas



               00                                 CTIONS.           Medical



                                                  follow           Branch



                                                  package           



                                                  directions           

 

     budesonide-      0      Yes            2{puff}      Inhale 2          

 [facility]



     formoterol      3-08                               Puffs 2           ity of



     (SYMBICORT)      22:37:                               (two)           [place]



     160-4.5      15                                 times           Medical



     mcg/actuati                                         daily.           Branch



     on inhaler                                                        

 

     budesonide-      0      Yes            2{puff}      Inhale 2          

 [facility]



     formoterol      3-08                               Puffs 2           ity of



     (SYMBICORT)      22:37:                               (two)           [place]



     160-4.5      15                                 times           Medical



     mcg/actuati                                         daily.           Branch



     on inhaler                                                        

 

     budesonide-            Yes            2{puff}      Inhale 2          

 [facility]



     formoterol      3-08                               Puffs 2           ity of



     (SYMBICORT)      22:37:                               (two)           [place]



     160-4.5      15                                 times           Medical



     mcg/actuati                                         daily.           Br[facility]



     on inhaler                                                        

 

     budesonide-      0      Yes            2{puff}      Inhale 2          

 [facility]



     formoterol      3-08                               Puffs 2           ity of



     (SYMBICORT)      22:37:                               (two)           [place]



     160-4.5      15                                 times           Medical



     mcg/actuati                                         daily.           Br[facility]



     on inhaler                                                        

 

     budesonide-            Yes            2{puff}      Inhale 2          

 [facility]



     formoterol      3-08                               Puffs 2           ity of



     (SYMBICORT)      16:37:                               (two)           [place]



     160-4.5      15                                 times           Medical



     mcg/actuati                                         daily.           Branch



     on inhaler                                                        

 

     budesonide-            Yes            2{puff}      Inhale 2          

 [facility]



     formoterol      3-08                               Puffs 2           ity of



     (SYMBICORT)      16:37:                               (two)           [place]



     160-4.5      15                                 times           Medical



     mcg/actuati                                         daily.           Branch



     on inhaler                                                        

 

     dexamethaso            Yes            6mg       6 mg, IV           Un

ethan



     ne        3-06                               Piggyback,           ity of



     (DECADRON      15:00:                               DAILY,           [place]



     PHOSPHATE)      00                                 First dose           Med

ical



     6 mg in                                         (after           Branch



     NaCl 0.9%                                         last           



     (NS) 50 mL                                         modificati           



     piggyback                                         on) on Sat           



                                                  3/6/21 at           



                                                  0900,           



                                                  Until           



                                                  Discontinu           



                                                  ed, 50 mL           

 

     sodium            Yes            1{spray      1 Spray,           Univ

ers



     chloride      3-05                     }         Nasal,           ity of



     (OCEAN MIST      17:30:                               PRN,           [place]



     NASAL) 0.65      50                                 Starting           Medi

yeni



     % nasal                                         Fri 3/5/21           Branch



     spray 1                                         at 1130,           



     Spray                                         Until           



                                                  Discontinu           



                                                  ed,            



                                                  Routine,           



                                                  Surgery/Pr           



                                                  ocedure,           



                                                  Fro            



                                                  irritation           



                                                  from High           



                                                  Flow Nasal           



                                                  Cannula           

 

     azithromyci       No             250mg      250 mg,           U

nivers



     n         3-04 03-06                          Oral,           ity of



     (ZITHROMAX)      15:00: 14:00                          DAILY, 3           T

exas



     tablet 250      00   :00                           doses,           Medical



     mg                                           First dose           Branch



                                                  on Thu           



                                                  3/4/21 at           



                                                  0900, Last           



                                                  dose on           



                                                  Sat 3/6/21           



                                                  at 0900,           



                                                  ASAP<br>Re           



                                                  ason for           



                                                  Anti-Infec           



                                                  tive:           



                                                  Empiric           



                                                  Therapy           



                                                  for            



                                                  Suspected           



                                                  Infection<           



                                                  br>Empiric           



                                                  Therapy           



                                                  Site:           



                                                  Respirator           



                                                  y<br>Durat           



                                                  ion of           



                                                  therapy:           



                                                  72 hours           

 

     sennosides            Yes            8.6mg      8.6 mg,           Uni

vers



     (SENOKOT)      3-03                               Oral, BID,           ity 

of



     tablet 8.6      17:30:                               First dose           T

exas



     mg        00                                 on Wed           Medical



                                                  3/3/21 at           Branch



                                                  1130,           



                                                  Until           



                                                  Discontinu           



                                                  ed,            



                                                  Routine           

 

     Polyethylen            Yes            17g       17 g,           Unive

rs



     e Glycol      3-03                               Oral, BID,           ity o

f



     3350      17:30:                               First dose           [place]



     (MIRALAX)      00                                 on Wed           Medical



     powder 17 g                                         3/3/21 at           [facility]

[facility]



                                                  1130,           



                                                  Until           



                                                  Discontinu           



                                                  ed,            



                                                  Routine           

 

     bisacodyL            Yes            10mg      10 mg,           Univer

s



     (DULCOLAX)      3-03                               Rectal,           ity of



     suppository      17:20:                               QDAILYPRN,           

[place]



     10 mg      14                                 Starting           Medical



                                                  Wed 3/3/21           Branch



                                                  at 1120,           



                                                  Until           



                                                  Discontinu           



                                                  ed,            



                                                  Routine,           



                                                  Constipati           



                                                  on             



                                                  unresolved           



                                                  by oral           



                                                  medication           



                                                  s              

 

     codeine-gua            Yes            10mL      10 mL,           Univ

ers



     ifenesin      3-03                               Oral,           ity of



     (ROBITUSSIN      14:50:                               Q4HPRN,           Matthew

as



     AC)       40                                 Starting           Medic

al



     mg/5 mL                                         Wed 3/3/21           Br[facility]



     solution 10                                         at 0850,           



     mL                                           Until           



                                                  Discontinu           



                                                  ed,            



                                                  Routine,           



                                                  Cough           

 

     ergocalcife            Yes            [id number]      50,000           Un

ethan



     rol       3-02                               Units,           ity of



     (vitamin      15:00:                               Oral,           [place]



     d2)       00                                 QWEEKLY,           Medical



     (CALCIFEROL                                         First dose           Br

anch



     ) capsule                                         on Tue           



     50,000                                         3/2/21 at           



     Units                                         0900,           



                                                  Until           



                                                  Discontinu           



                                                  ed,            



                                                  Routine           

 

     levothyroxi            Yes            137ug      137 mcg,           U

nivers



     ne        3-02                               Oral,           ity of



     (SYNTHROID)      12:00:                               [id number],           [facility]

[facility]



     tablet 137      00                                 First dose           Med

ical



     mcg                                          on Tue           [facility]



                                                  3/2/21 at           



                                                  0600,           



                                                  Until           



                                                  Discontinu           



                                                  ed             

 

     ascorbic            Yes            500mg      500 mg,           [facility]

rs



     acid      3-02                               Oral, BID,           ity of



     (vitamin C)      02:00:                               First dose           

[place]



     (VITAMIN C)      00                                 on Mon           [facility]

l



     tablet 500                                         3/1/21 at           [facility]

ch



     mg                                           2000,           



                                                  Until           



                                                  Discontinu           



                                                  ed,            



                                                  Routine           

 

     budesonide-            Yes            2{puff}      2 Puff,           

[facility]



     formoteroL      3-02                               Inhalation           ity

 of



     (SYMBICORT)      02:00:                               , BID,           [place]

[facility]



     160-4.5      00                                 First dose           [facility]

l



     mcg/actuati                                         on Mon           [facility]



     on inhaler                                         3/1/21 at           



     2 Puff                                         2000,           



                                                  Until           



                                                  Discontinu           



                                                  ed,            



                                                  Routine           

 

     enoxaparin            Yes            40mg      40 mg,           [facility]

rs



     (LOVENOX)      3-01                               Subcutaneo           ity 

of



     injection      23:00:                               us, DAILY,           [facility]

[facility]



     40 mg      00                                 First dose           [facility]



                                                  on Mon           [facility]



                                                  3/1/21 at           



                                                  1700,           



                                                  Until           



                                                  Discontinu           



                                                  ed,            



                                                  Routine           

 

     ondansetron            Yes            4mg       4 mg, Slow           

[facility]



     (ZOFRAN      3-01                               IV Push,           ity of



     (PF))      19:01:                               Q6HPRN,           [place]



     injection 4      50                                 Starting           Medi

yeni



     mg                                           Mon 3/1/21           [facility]



                                                  at 1301,           



                                                  Until           



                                                  Discontinu           



                                                  ed,            



                                                  Routine,           



                                                  Nausea and           



                                                  Vomiting           



                                                  (N/V)           

 

     cefTRIAXone            Yes            1000mg      1,000 mg,          

 [facility]



     (ROCEPHIN)      3-01                               IV             ity of



     1,000 mg in      18:45:                               Piggyback,           

[place]



     NaCl 0.9%      00                                 Q24H ABX,           Medic

al



     (NS) 50 mL                                         First dose           [facility]

[facility]



     MINI-BAG                                         on Mon           



                                                  3/1/21 at           



                                                  1245,           



                                                  Until           



                                                  Discontinu           



                                                  ed, 50           



                                                  mL<br&gt;R           



                                                  elizabeth for           



                                                  Anti-Infec           



                                                  tive:           



                                                  Empiric           



                                                  Therapy           



                                                  for            



                                                  Suspected           



                                                  Infection<           



                                                  br>Empiric           



                                                  Therapy           



                                                  Site:           



                                                  Respirator           



                                                  y<br>Durat           



                                                  ion of           



                                                  therapy:           



                                                  72 hours           

 

     iohexol      2021- No             100mL      100 mL,           Unive

rs



     (OMNIPAQUE      3-01 03-01                          Intravenou           it

y of



     350       18:05: 18:05                          s, ONCE, 1           [place]



     BULK-100      00   :00                           dose, Mon           Med[facility]

l



     mL)                                          3/1/21 at           [place]



     injection                                         1230,           



     100 mL                                         Routine           

 

     zinc            Yes            220mg      220 mg,           [facility]



     sulfate      3-01                               Oral,           ity of



     (ORAZINC)      18:00:                               DAILY,           [place]



     capsule 220      00                                 First dose           Me

dical



     mg                                           on Mon           [facility]



                                                  3/1/21 at           



                                                  1200,           



                                                  Until           



                                                  Discontinu           



                                                  ed,            



                                                  Routine           

 

     thiamine            Yes            100mg      100 mg,           Unive

rs



     (VITAMIN      3                               Oral,           ity of



     B1) tablet      18:00:                               DAILY,           [place]



     100 mg      00                                 First dose           Medical



                                                  on Mon           [facility]



                                                  3/1/21 at           



                                                  1200,           



                                                  Until           



                                                  Discontinu           



                                                  ed,            



                                                  Routine           

 

     azithromyci      2021- No             500mg      500 mg,           U

nivers



     n         3-01 03-03                          Oral,           ity of



     (ZITHROMAX)      17:45: 14:52                          DAILY, 3           [facility]

[facility]



     tablet 500      00   :00                           doses,           Medical



     mg                                           First dose           [facility]



                                                  on Mon           



                                                  3/1/21 at           



                                                  1145, Last           



                                                  dose on           



                                                  Wed 3/3/21           



                                                  at 0900,           



                                                  ASAP<br>Re           



                                                  ason for           



                                                  Anti-Infec           



                                                  tive:           



                                                  Empiric           



                                                  Therapy           



                                                  for            



                                                  Suspected           



                                                  Infection<           



                                                  br>Empiric           



                                                  Therapy           



                                                  Site: Skin           



                                                  / Soft           



                                                  tissue<br>           



                                                  Duration           



                                                  of             



                                                  therapy:           



                                                  72 hours           

 

     acetaminoph            Yes            650mg      650 mg,           Un

ethan



     en        3-01                               Oral,           ity of



     (TYLENOL)      17:41:                               Q6HPRN,           [place]



     tablet 650      38                                 Starting           Medic

al



     mg                                           Mon 3/1/21           [facility]



                                                  at 1141,           



                                                  Until           



                                                  Discontinu           



                                                  ed,            



                                                  Routine,           



                                                  Pain           



                                                  (scale           



                                                  1-3)           

 

     ibuprofen       No             800mg      800 mg,           Uni

vers



     (IBU)      3-01 03-01                          Oral,           ity of



     tablet 800      17:00: 15:56                          ONCE, 1           [place]

as



     mg        00   :00                           dose, Mon           Medical



                                                  3/1/21 at           [facility]



                                                  1100, ASAP           

 

     budesonide-      2019      Yes            2{puff}      Inhale 2          

 [facility]



     formoterol      1-25                               Puffs 2           ity of



     (SYMBICORT)      22:32:                               (two)           [place]



     160-4.5      24                                 times           Medical



     mcg/actuati                                         daily.           [facility]



     on inhaler                                                        

 

     predniSONE      2019      Yes       [id number] 40mg      Take 2           U

nivers



     20 mg      1-25                               tablets by           ity of



     tablet      00:00:                               mouth           [place]



               00                                 daily.           Medical



                                                                 Branch

 

     albuterol      2019      Yes       [id number] 2{puff}      Inhale 2        

   [facility]



     90        1-25                               Puffs           ity of



     mcg/actuati      00:00:                               every 4           [facility]

as



     on inhaler      00                                 (four)           Medical



                                                  hours as           [facility]



                                                  needed for           



                                                  Wheezing           



                                                  or             



                                                  Shortness           



                                                  of Breath.           

 

     azithromyci      2019      Yes       [id number] 250mg      Take 1          

 [facility]



     n         1-25                               tablet by           ity of



     (ZITHROMAX      00:00:                               mouth           [place]



     Z-ELIZABETH) 250      00                                 SEE-INSTRU           Med

ical



     mg tablet                                         CTIONS.           [facility]



                                                  Take 500           



                                                  mg day 1,           



                                                  then 250           



                                                  mg days 2           



                                                  to 5.           

 

     benzonatate      2019      Yes       [id number] 100mg      Take 1          

 [facility]



     100 mg      1-25                               capsule by           ity of



     capsule      00:00:                               mouth 3           [place]



               00                                 (three)           Medical



                                                  times           [facility]



                                                  daily as           



                                                  needed for           



                                                  Cough.           

 

     predniSONE      2019- No        [id number] 40mg      Take 2           

[facility]



     20 mg                                tablets by           ity of



     tablet      00:00: 00:00                          mouth           [place]



               00   :00                           daily.           Medical



                                                                 Branch

 

     albuterol      2019- No        [id number] 2{puff}      Inhale 2       

    [facility]



     90        -08                          Puffs           ity of



     mcg/actuati      00:00: 00:00                          every 4           [facility]

[facility]



     on inhaler      00   :00                           (four)           Medical



                                                  hours as           [facility]



                                                  needed for           



                                                  Wheezing           



                                                  or             



                                                  Shortness           



                                                  of Breath.           

 

     azithromyci      2019- No        [id number] 250mg      Take 1         

  [facility]



     n                                   tablet by           ity of



     (ZITHROMAX      00:00: 00:00                          mouth           [place]



     Z-ELIZABETH) 250      00   :00                           SEE-INSTRU           Med

ical



     mg tablet                                         CTIONS.           [facility]



                                                  Take 500           



                                                  mg day 1,           



                                                  then 250           



                                                  mg days 2           



                                                  to 5.           

 

     benzonatate      2019- No        [id number] 100mg      Take 1         

  [facility]



     100 mg      08                          capsule by           ity of



     capsule      00:00: 00:00                          mouth 3           [place]



               00   :00                           (three)           Medical



                                                  times           [facility]



                                                  daily as           



                                                  needed for           



                                                  Cough.           

 

     budesonide-            Yes            2{puff}      Inhale 2          

 [facility]



     formoterol      8-07                               Puffs 2           ity of



     (SYMBICORT)      13:22:                               (two)           [place]



     160-4.5      24                                 times           Medical



     mcg/actuati                                         daily.           [facility]



     on inhaler                                                        

 

     budesonide-            Yes            2{puff}      Inhale 2          

 [facility]



     formoterol      8-07                               Puffs 2           ity of



     (SYMBICORT)      13:22:                               (two)           [place]



     160-4.5      24                                 times           Medical



     mcg/actuati                                         daily.           [facility]



     on inhaler                                                        

 

     Levothyroxi            Yes            137ug      Take 137           U

nivers



     ne 137 mcg      8-07                               mcg by           ity of



     Cap       00:00:                               mouth           [place]



               00                                 daily.           Medical



                                                                 Branch

 

     Levothyroxi            Yes            137ug      Take 137           U

nivers



     ne 137 mcg      8-07                               mcg by           ity of



     Cap       00:00:                               mouth           [place]



               00                                 daily.           Medical



                                                                 Branch

 

     Levothyroxi            Yes            137ug      Take 137           U

nivers



     ne 137 mcg      8-07                               mcg by           ity of



     Cap       00:00:                               mouth           [place]



               00                                 daily.           Medical



                                                                 Branch

 

     Levothyroxi            Yes            137ug      Take 137           U

nivers



     ne 137 mcg      8-07                               mcg by           ity of



     Cap       00:00:                               mouth           [place]



               00                                 daily.           Medical



                                                                 Branch

 

     Levothyroxi            Yes            137ug      Take 137           U

nivers



     ne 137 mcg      8-07                               mcg by           ity of



     Cap       00:00:                               mouth           [place]



               00                                 daily.           Medical



                                                                 Branch

 

     Levothyroxi            Yes            137ug      Take 137           U

nivers



     ne 137 mcg      8-07                               mcg by           ity of



     Cap       00:00:                               mouth           [place]



               00                                 daily.           Medical



                                                                 Branch

 

     Levothyroxi            Yes            137ug      Take 137           U

nivers



     ne 137 mcg      8-07                               mcg by           ity of



     Cap       00:00:                               mouth           [place]



               00                                 daily.           Medical



                                                                 Branch

 

     Levothyroxi            Yes            137ug      Take 137           U

nivers



     ne 137 mcg      8-07                               mcg by           ity of



     Cap       00:00:                               mouth           [place]



               00                                 daily.           Medical



                                                                 Branch

 

     Levothyroxi            Yes            137ug      Take 137           U

nivers



     ne 137 mcg      8-07                               mcg by           ity of



     Cap       00:00:                               mouth           [place]



               00                                 daily.           Medical



                                                                 Branch

 

     oseltamivir      2017      Yes            75mg      Take 1           Univ

ers



     (TAMIFLU)      2-26                               capsule by           ity 

of



     75 mg      00:00:                               mouth 2           [place]



     capsule      00                                 (two)           Medical



                                                  times           Branch



                                                  daily.           

 

     oseltamivir      2017      Yes            75mg      Take 1           Univ

ers



     (TAMIFLU)      2-26                               capsule by           ity 

of



     75 mg      00:00:                               mouth 2           [place]



     capsule      00                                 (two)           Medical



                                                  times           Branch



                                                  daily.           

 

     ALBUTEROL            Yes            2.5mg      Inhale 3           Uni

vers



     2.5 mg /3      5-20                               mL every 6           ity 

of



     mL (0.083      00:00:                               (six)           [place]



     %)        00                                 hours as           Medical



     nebulizer                                         needed for           [facility]

ch



     solution                                         Wheezing           



                                                  or             



                                                  Shortness           



                                                  of Breath.           



                                                  May also           



                                                  nebulize           



                                                  one extra           



                                                  every 6           



                                                  hours.           

 

     ALBUTEROL            Yes            2{puff}      Inhale 2           U

nivers



     90        5-20                               Puffs           ity of



     mcg/actuati      00:00:                               every 4           [facility]

as



     on inhaler      00                                 (four)           Medical



                                                  hours as           Branch



                                                  needed for           



                                                  Wheezing           



                                                  or             



                                                  Shortness           



                                                  of Breath.           

 

     ALBUTEROL            Yes            2.5mg      Inhale 3           Uni

vers



     2.5 mg /3      5-20                               mL every 6           ity 

of



     mL (0.083      00:00:                               (six)           [place]



     %)        00                                 hours as           Medical



     nebulizer                                         needed for           Bran

ch



     solution                                         Wheezing           



                                                  or             



                                                  Shortness           



                                                  of Breath.           



                                                  May also           



                                                  nebulize           



                                                  one extra           



                                                  every 6           



                                                  hours.           

 

     ALBUTEROL      -0      Yes            2{puff}      Inhale 2           U

nivers



     90        5-20                               Puffs           ity of



     mcg/actuati      00:00:                               every 4           [facility]

as



     on inhaler      00                                 (four)           Medical



                                                  hours as           Branch



                                                  needed for           



                                                  Wheezing           



                                                  or             



                                                  Shortness           



                                                  of Breath.           

 

     ALBUTEROL      -0      Yes            2.5mg      Inhale 3           Uni

vers



     2.5 mg /3      5-20                               mL every 6           ity 

of



     mL (0.083      00:00:                               (six)           [place]



     %)        00                                 hours as           Medical



     nebulizer                                         needed for           Bran

ch



     solution                                         Wheezing           



                                                  or             



                                                  Shortness           



                                                  of Breath.           



                                                  May also           



                                                  nebulize           



                                                  one extra           



                                                  every 6           



                                                  hours.           

 

     ALBUTEROL      -      Yes            2{puff}      Inhale 2           U

nivers



     90        5-20                               Puffs           ity of



     mcg/actuati      00:00:                               every 4           [facility]

as



     on inhaler      00                                 (four)           Medical



                                                  hours as           Branch



                                                  needed for           



                                                  Wheezing           



                                                  or             



                                                  Shortness           



                                                  of Breath.           

 

     ALBUTEROL      -      Yes            2.5mg      Inhale 3           Uni

vers



     2.5 mg /3      5-20                               mL every 6           ity 

of



     mL (0.083      00:00:                               (six)           [place]



     %)        00                                 hours as           Medical



     nebulizer                                         needed for           Bran

ch



     solution                                         Wheezing           



                                                  or             



                                                  Shortness           



                                                  of Breath.           



                                                  May also           



                                                  nebulize           



                                                  one extra           



                                                  every 6           



                                                  hours.           

 

     ALBUTEROL      -0      Yes            2{puff}      Inhale 2           U

nivers



     90        5-20                               Puffs           ity of



     mcg/actuati      00:00:                               every 4           [facility]

as



     on inhaler      00                                 (four)           Medical



                                                  hours as           Branch



                                                  needed for           



                                                  Wheezing           



                                                  or             



                                                  Shortness           



                                                  of Breath.           

 

     ALBUTEROL      -0      Yes            2.5mg      Inhale 3           Uni

vers



     2.5 mg /3      5-20                               mL every 6           ity 

of



     mL (0.083      00:00:                               (six)           [place]



     %)        00                                 hours as           Medical



     nebulizer                                         needed for           Bran

ch



     solution                                         Wheezing           



                                                  or             



                                                  Shortness           



                                                  of Breath.           



                                                  May also           



                                                  nebulize           



                                                  one extra           



                                                  every 6           



                                                  hours.           

 

     ALBUTEROL      -0      Yes            2{puff}      Inhale 2           U

nivers



     90        5-20                               Puffs           ity of



     mcg/actuati      00:00:                               every 4           [facility]

as



     on inhaler      00                                 (four)           Medical



                                                  hours as           Branch



                                                  needed for           



                                                  Wheezing           



                                                  or             



                                                  Shortness           



                                                  of Breath.           

 

     ZITHROMAX      2017-0      Yes            250mg      Take 1           Unive

rs



     Z-ELIZABETH 250      5-20                               tablet by           ity o

f



     mg dose      00:00:                               mouth           [place]



     pack      00                                 SEE-INSTRU           Medical



                                                  CTIONS.           Branch



                                                  Take 500           



                                                  mg day 1,           



                                                  then 250           



                                                  mg days 2           



                                                  to 5.           

 

     ALBUTEROL      2017-0      Yes            2.5mg      Inhale 3           Uni

vers



     2.5 mg /3      5-20                               mL every 6           ity 

of



     mL (0.083      00:00:                               (six)           [place]



     %)        00                                 hours as           Medical



     nebulizer                                         needed for           Bran

ch



     solution                                         Wheezing           



                                                  or             



                                                  Shortness           



                                                  of Breath.           



                                                  May also           



                                                  nebulize           



                                                  one extra           



                                                  every 6           



                                                  hours.           

 

     ALBUTEROL      2017-0      Yes            2{puff}      Inhale 2           U

nivers



     90        5-20                               Puffs           ity of



     mcg/actuati      00:00:                               every 4           [facility]

as



     on inhaler      00                                 (four)           Medical



                                                  hours as           [facility]



                                                  needed for           



                                                  Wheezing           



                                                  or             



                                                  Shortness           



                                                  of Breath.           

 

     ZITHROMAX      2017-0      Yes            250mg      Take 1           Unive

rs



     Z-ELIZABETH 250      5-20                               tablet by           ity o

f



     mg dose      00:00:                               mouth           [place]



     pack      00                                 SEE-INSTRU           Medical



                                                  CTIONS.           Branch



                                                  Take 500           



                                                  mg day 1,           



                                                  then 250           



                                                  mg days 2           



                                                  to 5.           

 

     ALBUTEROL      2017-0      Yes            2.5mg      Inhale 3           Uni

vers



     2.5 mg /3      5-20                               mL every 6           ity 

of



     mL (0.083      00:00:                               (six)           [place]



     %)        00                                 hours as           Medical



     nebulizer                                         needed for           Bran

ch



     solution                                         Wheezing           



                                                  or             



                                                  Shortness           



                                                  of Breath.           



                                                  May also           



                                                  nebulize           



                                                  one extra           



                                                  every 6           



                                                  hours.           

 

     ALBUTEROL      2017-0      Yes            2{puff}      Inhale 2           U

nivers



     90        5-20                               Puffs           ity of



     mcg/actuati      00:00:                               every 4           [facility]

as



     on inhaler      00                                 (four)           Medical



                                                  hours as           Branch



                                                  needed for           



                                                  Wheezing           



                                                  or             



                                                  Shortness           



                                                  of Breath.           

 

     ALBUTEROL      2017-0      Yes            2.5mg      Inhale 3           Uni

vers



     2.5 mg /3      5-20                               mL every 6           ity 

of



     mL (0.083      00:00:                               (six)           [place]



     %)        00                                 hours as           Medical



     nebulizer                                         needed for           Bran

ch



     solution                                         Wheezing           



                                                  or             



                                                  Shortness           



                                                  of Breath.           



                                                  May also           



                                                  nebulize           



                                                  one extra           



                                                  every 6           



                                                  hours.           

 

     ALBUTEROL      2017-0      Yes            2{puff}      Inhale 2           U

nivers



     90        5-20                               Puffs           ity of



     mcg/actuati      00:00:                               every 4           [facility]

as



     on inhaler      00                                 (four)           Medical



                                                  hours as           Branch



                                                  needed for           



                                                  Wheezing           



                                                  or             



                                                  Shortness           



                                                  of Breath.           

 

     ALBUTEROL            Yes            2.5mg      Inhale 3           Uni

vers



     2.5 mg /3      5-20                               mL every 6           ity 

of



     mL (0.083      00:00:                               (six)           [place]



     %)        00                                 hours as           Medical



     nebulizer                                         needed for           Bran

ch



     solution                                         Wheezing           



                                                  or             



                                                  Shortness           



                                                  of Breath.           



                                                  May also           



                                                  nebulize           



                                                  one extra           



                                                  every 6           



                                                  hours.           

 

     ALBUTEROL            Yes            2{puff}      Inhale 2           U

nivers



     90        5-20                               Puffs           ity of



     mcg/actuati      00:00:                               every 4           [facility]

as



     on inhaler      00                                 (four)           Medical



                                                  hours as           Branch



                                                  needed for           



                                                  Wheezing           



                                                  or             



                                                  Shortness           



                                                  of Breath.           

 

     FLUTICASONE            Yes                      Inhale           Univ

ers



     /VILANTEROL      4-22                               daily.           ity of



     (BREO      16:25:                                              [place]



     ELLIPTA      10                                                Medical



     INHALE)                                                        Branch

 

     FLUTICASONE            Yes                      Inhale           Univ

ers



     /VILANTEROL      4-22                               daily.           ity of



     (BREO      16:25:                                              [place]



     ELLIPTA      10                                                Medical



     INHALE)                                                        Branch

 

     meclizine            Yes                      TAKE 1           Univer

s



     (ANTIVERT)      8-21                               TABLET BY           ity 

of



     25 mg      00:00:                               MOUTH           [place]



     tablet      00                                 EVERY 8           Medical



                                                  HOURS AS           Branch



                                                  NEEDED           

 

     meclizine            Yes                      TAKE 1           Univer

s



     (ANTIVERT)      8-21                               TABLET BY           ity 

of



     25 mg      00:00:                               MOUTH           [place]



     tablet      00                                 EVERY 8           Medical



                                                  HOURS AS           Branch



                                                  NEEDED           







Vital Signs







             Vital Name   Observation Time Observation Value Comments     Source

 

             Systolic blood 2021 22:35:00 134 mm[Hg]                Univer

sity [facility]

 

             Diastolic blood 2021 22:35:00 88 mm[Hg]                 Unive

[facility]

 

             Heart rate   2021 22:35:00 88 /min                   [facility]

 

             Body temperature 2021 22:35:00 36.39 Cathy                 [facility]

 

             Respiratory rate 2021 22:35:00 18 /min                   [facility]

 

             Body weight  2021 22:35:00 79.379 kg                 [facility]

 

             BMI          2021 22:35:00 26.61 kg/m2               [facility]

 

             Oxygen saturation in 2021 22:35:00 97 /min                   

[facility]



             Arterial blood by                                        [place] [facility]

yeni



             Pulse oximetry                                        Branch

 

             Systolic blood 2021 21:00:00 107 mm[Hg]                Univer

sity [facility]

 

             Diastolic blood 2021 21:00:00 66 mm[Hg]                 [facility]

 

             Body temperature 2021 21:00:00 36.67 Cathy                 [facility]

 

             Heart rate   2021 10:01:00 64 /min                   [facility]

 

             Respiratory rate 2021 10:01:00 18 /min                   [facility]

 

             Oxygen saturation in 2021 10:01:00 97 /min                   

[facility]



             Arterial blood by                                        [place] [facility]

yeni



             Pulse oximetry                                        Branch

 

             Body height  2021 17:45:00 172.7 cm                  [facility]

 

             Body weight  2021 17:45:00 79.47 kg                  [facility]

 

             BMI          2021 17:45:00 26.64 kg/m2               [facility]







Procedures







                Procedure       Date / Time     Performing Clinician Source



                                Performed                       

 

                XR CHEST 2 VW   2021 23:43:46 Chhaya Reyes [facility]

 

                CONSENT/REFUSAL FOR 2021 22:29:32 Doctor Unassigned, No Un

[facility]



                DIAGNOSIS AND TREATMENT                 Name            [facility]

 

                BASIC METABOLIC PANEL 2021 11:39:00 Arlen Peres    Univer

[facility] [place]



                (NA, K, CL, CO2,                                 [facility]



                GLUCOSE, BUN,                                   



                CREATININE, CA)                                 

 

                CBC WITH DIFF   2021 11:39:00 Ja [facility]

 

                BASIC METABOLIC PANEL 2021 10:47:00 Ja Arlen    Univer

[facility] [place]



                (NA, K, CL, CO2,                                 [facility]



                GLUCOSE, BUN,                                   



                CREATININE, CA)                                 

 

                CBC WITH DIFF   2021 10:47:00 [facility]

 

                C-REACTIVE [facility]OTE[facility] 2021 [facility]:18:00 [facility]

 

                COMP. METABOLIC PANEL 2021 11:18:00 Marcos Buchanan [facility]



                ([id number])                                         [facility]

 

                CBC WITH DIFF   2021 11:18:00 Chanel [facility]

 

                PROCALCITONIN   2021 11:18:00 Chanel [facility]

 

                POCT GLUCOSE (AUTOMATED) 2021 17:38:00 [facility]

 

                THYROID STIMULATING 2021 09:59:00 Marcos Buchanan [facility]



                HORMONE                                         [facility]

 

                BASIC METABOLIC PANEL 2021 09:59:00 Chanel [facility]



                (NA, K, CL, CO2,                                 [facility]



                GLUCOSE, BUN,                                   



                CREATININE, CA)                                 

 

                CBC WITH DIFF   2021 09:59:00 Chanel [facility]

 

                URINALYSIS      2021 03:16:00 Michael Camarena [facility]

 

                PNEUMOCOCCAL ANTIGEN 2021 03:16:00 Chanel Marcos [facility]

 

                LACTATE DEHYDROGENASE 2021 18:48:00 Chanel [facility]

 

                C-REACTIVE PROTEIN 2021 18:48:00 Chanel [facility]

 

                VITAMIN D, 25-OH 2021 18:48:00 Chanel [facility]

 

                PROCALCITONIN   2021 18:48:00 Chanel [facility]

 

                CT CHEST PULMONARY 2021 18:15:58 Chanel [facility]



                ANGIOGRAM                                       [facility]

 

                HB ECG ROUTINE & RHYTHM 2021 16:14:47 Michael Camarena [facility]



                STRIP                                           [facility]

 

                XR CHEST 1 VW   2021 16:14:07 Michael Camarena [facility]

 

                BLOOD CULTURE SCREEN 2021 16:05:00 Michael Camarena [facility]

 

                COVID-19 (ID NOW RAPID 2021 16:05:00 Michael [facility]rena [facility]



                [facility]

 

                BLOOD CULTURE SCREEN 2021 16:04:00 Michael Camarena [facility]

 

                CREATINE KINASE 2021 16:04:00 Marcos Buchanan [facility]

 

                FERRITIN SERUM  2021 16:04:00 Chanel [facility]

 

                TROPONIN I      2021 16:04:00 Michael Camarena [facility]

 

                HEPATIC FUNCTION PANEL 2021 16:04:00 Michael Camarena [facility]



                ([id number]) (ALB,T.PRO,BILI                                 [facility] 

Branch



                T,BU/BC,ALT,AST,ALK                                 



                PHOS)                                           

 

                BASIC METABOLIC PANEL 2021 16:04:00 Michael Camarena [facility]



                (NA, K, CL, CO2,                                 Medical Branch



                GLUCOSE, BUN,                                   



                CREATININE, CA)                                 

 

                CBC WITH DIFF   2021 16:04:00 Michael Camarena [facility]

 

                GLYCOSYLATED HEMOGLOBIN 2021 16:04:00 Marcos Buchanan [facility]



                (A1C)                                           [facility]

 

                PROTHROMBIN TIME / INR 2021 16:04:00 [facility]

 

                D-DIMER         2021 16:04:00 Marcos Buchanan [facility]

 

                ACTIVATED PARTIAL 2021 16:04:00 Michael Camarena [facility]



                THRMPLAS ROSE                                    [facility]

 

                N-TERMINAL PRO-BNP 2021 16:04:00 Michael Camarena [facility]

 

                NOTICE OF PRIVACY 2021 15:19:40 Doctor Unassigned, No [facility]



                PRACTICES                       Name            Medical Branch

 

                CONSENT/REFUSAL FOR 2021 15:19:24 Doctor Unassigned, No Un

[facility]



                DIAGNOSIS AND TREATMENT                 Name            Medical 

Branch

 

                CONSENT/REFUSAL FOR 2019 20:22:33 Doctor Unassigned, No [facility]



                DIAGNOSIS AND TREATMENT                 Name            Medical 

Branch

 

                ASSIGNMENT OF BENEFITS 2019 20:22:19 Doctor Unassigned, No

 [facility]







Encounters







        Start   End     Encounter Admission Attending Care    Care    Encounter 

Source



        Date/Time Date/Time Type    Type    Clinicians Facility Department ID   

   

 

        2021-10-31         Emergency                 [facility]    [id number] 

[facility]



        02:01:55                                                         ity of



                                                                        [facility]

 

        2021 Emergency X       PASCALE, [facility]    ERT     [id number]

[id number] [facility]



        16:37:00 18:38:00                 CHHAYA                         itbertrand of



                                                                        [facility]

 

        2021 Emergency         [facility]    1.2.840.114 [id number]

[id number] [facility]



        16:37:00 18:38:00                 Chhaya TATUM 350.1.13.10         i

ty of



                                                [place] 4.2.7.2.686         [place]

[place]



                                                CAMPUS  [phone]         Medi

yeni



                                                        084             Branch

 

        2021 Orders          Doctor  JULIO    1.2.840.114 [id number]

70 [facility]



        00:00:00 00:00:00 Only            Unassigned, MAYRA   350.1.13.10       

  ity of



                                        [facility] 4.2.7.2.686         Matthew

as



                                                        [phone]         Medi

yeni



                                                        009             Branch

 

        2021 Patient         Luis  [facility]    1.2.840.114 [id number]

[id number] Univers



        00:00:00 00:00:00 Outreach         Bernardoamari ARMIJO 350.1.13.10         i

ty of



                                        Beni  CARE    4.2.7.2.686         [place]

[place]



                                                [place] [phone]         Me

dical



                                                        388             Branch

 

        2021-03-10 2021-03-10 Patient         Darya Padgett  1.2.840.114 [id number]

[id number] Univers



        00:00:00 00:00:00 Outreach         E       Hawkins   350.1.13.10         i

ty of



                                                [place]   4.2.7.2.686         [place]

[place]



                                                        [phone]         Medi

yeni



                                                        403             Branch

 

        2021 Transition         Arelis Wilson  1.2.840.114 [id number]

[id number] Univers



        00:00:00 00:00:00 of Care         Katie Hawkins   350.1.13.10        

 ity of



                                                [place]   4.2.7.2.686         [place]

[place]



                                                        [phone]         Medi

yeni



                                                        403             Branch

 

        2021 Hospital         Michael Camarean [facility]    1.2.840.1

14 [id number] 

Univers



        09:36:00 16:20:00 Encounter         Chanel Marcos Gissel 350.1.13.10

         ity of



                                                [place] 4.2.7.2.686         [facility]  [phone]         Medi

yeni



                                                        080             [place]

 

        2021 Orders          Doctor  JULIO    1.2.840.114 [id number]

23 Univers



        00:00:00 00:00:00 Only            Unassigned, MAYRA   350.1.13.10       

  ity of



                                        [facility] 4.2.7.2.686         Matthew

as



                                                        [phone]         [address]

 

        2020 Outpatient                 [facility]    [id number]

-20 Univers



        13:30:00 13:30:00                                         [id number]  ity [facility]

 

        2020 Outpatient R       RADIOLOGY [facility]    [id number]

[id number] Univers



        00:00:00 00:00:00                                                 ity [facility]

 

        2019 Orders          Doctor KIM    1.2.840.114 [id number]

53 



        00:00:00 00:00:00 Only            Unassigned, MAYRA   350.1.13.10       

  



                                        [facility] 4.2.7.2.686         



                                                        [phone]         



                                                        009             

 

        2019 Orders          Doctor KIM    1.2.840.114 [id number]

53 Univers



        00:00:00 00:00:00 Only            Unassigned, MAYRA   350.1.13.10       

  ity of



                                        [facility] 4.2.7.2.686         Matthew

as



                                                        [phone]         [address]

 

        2019 Telephone         Rico,    [facility]    [id number]

[id number] 



        00:00:00 00:00:00                 Ava Tatum 350.1.13.10         



                                                [place] 4.2.7.2.686         



                                                [place] [phone]         



                                                [address]                 

 

        2019 Telephone         Rico,    [facility]    [id number]

[id number] Univers



        00:00:00 00:00:00                 Ava Tatum 350.1.13.10         i

ty of



                                                [place] 4.2.7.2.686         Boris Conner [phone]         Me

dical



                                                nal     [address]             Branch



                                                Building                 







Results







           Test Description Test Time  Test Comments Results    Result Comments 

Source









                    BASIC METABOLIC PANEL (NA, K, CL, CO2, GLUCOSE, BUN, 2021 12:18:34 



                    CREATININE, CA)                         









                      Test Item  Value      Reference Range Interpretation Comme

nts









             NA (test code = 8396834548) 137 mmol/L   135-145                   

 

             K (test code = 5509363784) 4.0 mmol/L   3.5-5.0                   

 

             CL (test code = 4140705365) 102 mmol/L                       

 

             CO2 TOTAL (test code = 4814168490) 30 mmol/L    23-31              

       

 

             AGAP (test code = 8202142883)              2-16                    

  

 

             BUN (test code = 8356892686) 22 mg/dL     7-23                     

 

 

             GLUCOSE (test code = 4657063298) 102 mg/dL                   

     

 

             CREATININE (test code = 0.69 mg/dL   0.60-1.25                 



             5463048390)                                         

 

             CALCIUM (test code = 2237792218) 8.5 mg/dL    8.6-10.6     L       

     

 

             eGFR Calculation (Non-              mL/min/1.73m2           

   



             American) (test code = 9743782818)                                 

       

 

             eGFR Calculation (African              mL/min/1.73m2              



             American) (test code = 4194791154)                                 

       

 

             MISA (test code = MISA) Association of Glomerular                    

       



                          Filtration Rate (GFR) and Staging                     

      



                          of Kidney Disease*                           



                          +-----------------------+--------                     

      



                          -------------+-------------------                     

      



                          ------+| GFR (mL/min/1.73 m2) ?|                      

     



                          With Kidney Damage ?| ?Without                        

   



                          Kidney                                 



                          Damage+-----------------------+--                     

      



                          -------------------+-------------                     

      



                          ------------+| ?>90 ? ? ? ? ? ? ?                     

      



                          ? ?| ?Stage one ? ? ? ? ?| ?                          

 



                          Normal ? ? ? ? ? ? ?                           



                          ?+-----------------------+-------                     

      



                          --------------+------------------                     

      



                          -------+| ?60-89 ? ? ? ? ? ? ? ?|                     

      



                          ?Stage two ? ? ? ? ?| ? Decreased                     

      



                          GFR ? ? ? ?                            



                          +-----------------------+--------                     

      



                          -------------+-------------------                     

      



                          ------+| ?30-59 ? ? ? ? ? ? ? ?|                      

     



                          ?Stage three ? ? ? ?| ? Stage                         

  



                          three ? ? ? ? ?                           



                          +-----------------------+--------                     

      



                          -------------+-------------------                     

      



                          ------+| ?15-29 ? ? ? ? ? ? ? ?|                      

     



                          ?Stage four ? ? ? ? | ? Stage                         

  



                          four ? ? ? ? ?                           



                          ?+-----------------------+-------                     

      



                          --------------+------------------                     

      



                          -------+| ?<15 (or dialysis) ? ?|                     

      



                          ?Stage five ? ? ? ? | ? Stage                         

  



                          five ? ? ? ? ?                           



                          ?+-----------------------+-------                     

      



                          --------------+------------------                     

      



                          -------+ *Each stage assumes the                      

     



                          associated GFR level has been in                      

     



                          effect for at least three months.                     

      



                          ?Stages 1 to 5, with or without                       

    



                          kidney disease, indicate chronic                      

     



                          kidney disease. Notes:                           



                          Determination of stages one and                       

    



                          two (with eGFR >59mL/min/1.73 m2)                     

      



                          requires estimation of kidney                         

  



                          damage for at least three months                      

     



                          as defined by structural or                           



                          functional abnormalities of the                       

    



                          kidney, manifested by                           



                          either:Pathological abnormalities                     

      



                          or Markers of kidney damage                           



                          (including abnormalities in the                       

    



                          composition of the blood or urine                     

      



                          or abnormalities in imaging                           



                          tests).                                

 

             Lab Interpretation (test code = Abnormal                           

    



             10696-2)                                            



[facility] WITH [id number] 12:08:31





             Test Item    Value        Reference Range Interpretation Comments

 

             WBC (test code =              See_Comment  H             [Automated



             6690-2)                                             message] The



                                                                 system which



                                                                 generated this



                                                                 result transmit

rodrick



                                                                 reference range

:



                                                                 4.20 - 10.70



                                                                 10*3/?L. The



                                                                 reference range



                                                                 was not used to



                                                                 interpret this



                                                                 result as



                                                                 normal/abnormal

.

 

             RBC (test code =              See_Comment                [Automated



             789-8)                                              message] The



                                                                 system which



                                                                 generated this



                                                                 result transmit

rodrick



                                                                 reference range

:



                                                                 4.26 - 5.52



                                                                 10*6/?L. The



                                                                 reference range



                                                                 was not used to



                                                                 interpret this



                                                                 result as



                                                                 normal/abnormal

.

 

             HGB (test code = 14.3 g/dL    12.2-16.4                 



             718-7)                                              

 

             HCT (test code = 40.6 %       38.4-49.3                 



             4544-3)                                             

 

             MCV (test code = 83.4 fL      81.7-95.6                 



             787-2)                                              

 

             MCH (test code = 29.4 pg      26.1-32.7                 



             785-6)                                              

 

             MCHC (test code = 35.2 g/dL    31.2-35.0    H            



             786-4)                                              

 

             RDW-SD (test code = 39.8 fL      38.5-51.6                 



             71476-1)                                            

 

             RDW-CV (test code = 13.2 %       12.1-15.4                 



             788-0)                                              

 

             PLT (test code =              See_Comment  H             [Automated



             777-3)                                              message] The



                                                                 system which



                                                                 generated this



                                                                 result transmit

rodrick



                                                                 reference range

:



                                                                 150 - 328 10*3/

?L.



                                                                 The reference



                                                                 range was not u

sed



                                                                 to interpret th

is



                                                                 result as



                                                                 normal/abnormal

.

 

             MPV (test code = 9.9 fL       9.8-13.0                  



             03530-2)                                            

 

             NRBC/100 WBC (test              See_Comment                [Automat

ed



             code = 9487349691)                                        message] 

The



                                                                 system which



                                                                 generated this



                                                                 result transmit

rodrick



                                                                 reference range

:



                                                                 0.0 - 10.0 /100



                                                                 WBCs. The



                                                                 reference range



                                                                 was not used to



                                                                 interpret this



                                                                 result as



                                                                 normal/abnormal

.

 

             NRBC x10^3 (test code <0.01        See_Comment                [Auto

mated



             = [id number])                                        message] The



                                                                 system which



                                                                 generated this



                                                                 result transmit

rodrick



                                                                 reference range

:



                                                                 10*3/?L. The



                                                                 reference range



                                                                 was not used to



                                                                 interpret this



                                                                 result as



                                                                 normal/abnormal

.

 

             GRAN MAT (NEUT) % 82.9 %                                 



             (test code = 770-8)                                        

 

             IMM GRAN % (test code 3.60 %                                 



             = 0348998259)                                        

 

             LYMPH % (test code = 8.3 %                                  



             736-9)                                              

 

             MONO % (test code = 4.4 %                                  



             5905-5)                                             

 

             EOS % (test code = 0.5 %                                  



             713-8)                                              

 

             BASO % (test code = 0.3 %                                  



             706-2)                                              

 

             GRAN MAT x10^3(ANC) 21.94 10*3/uL 1.99-6.95    H            



             (test code =                                        



             8905785223)                                         

 

             IMM GRAN x10^3 (test 0.95 10*3/uL 0.00-0.06    H            



             code = 8625604732)                                        

 

             LYMPH x10^3 (test code 2.21 10*3/uL 1.09-3.23                 



             = 731-0)                                            

 

             MONO x10^3 (test code 1.16 10*3/uL 0.36-1.02    H            



             = 742-7)                                            

 

             EOS x10^3 (test code = 0.13 10*3/uL 0.06-0.53                 



             711-2)                                              

 

             BASO x10^3 (test code 0.09 10*3/uL 0.01-0.09                 



             = 704-7)                                            

 

             Lab Interpretation Abnormal                               



             (test code = 13686-7)                                        



[facility] WITH [id number] 12:11:01





             Test Item    Value        Reference Range Interpretation Comments

 

             WBC (test code =              See_Comment  H             [Automated



             0290-2)                                             message] The



                                                                 system which



                                                                 generated this



                                                                 result transmit

rodrick



                                                                 reference range

:



                                                                 4.20 - 10.70



                                                                 10*3/?L. The



                                                                 reference range



                                                                 was not used to



                                                                 interpret this



                                                                 result as



                                                                 normal/abnormal

.

 

             RBC (test code =              See_Comment                [Automated



             789-8)                                              message] The



                                                                 system which



                                                                 generated this



                                                                 result transmit

rodrick



                                                                 reference range

:



                                                                 4.26 - 5.52



                                                                 10*6/?L. The



                                                                 reference range



                                                                 was not used to



                                                                 interpret this



                                                                 result as



                                                                 normal/abnormal

.

 

             HGB (test code = 14.4 g/dL    12.2-16.4                 



             718-7)                                              

 

             HCT (test code = 43.5 %       38.4-49.3                 



             4544-3)                                             

 

             MCV (test code = 85.8 fL      81.7-95.6                 



             787-2)                                              

 

             MCH (test code = 28.4 pg      26.1-32.7                 



             785-6)                                              

 

             MCHC (test code = 33.1 g/dL    31.2-35.0                 



             786-4)                                              

 

             RDW-SD (test code = 40.9 fL      38.5-51.6                 



             72405-6)                                            

 

             RDW-CV (test code = 13.2 %       12.1-15.4                 



             788-0)                                              

 

             PLT (test code =              See_Comment                [Automated



             777-3)                                              message] The



                                                                 system which



                                                                 generated this



                                                                 result transmit

rodrick



                                                                 reference range

:



                                                                 150 - 328 10*3/

?L.



                                                                 The reference



                                                                 range was not u

sed



                                                                 to interpret th

is



                                                                 result as



                                                                 normal/abnormal

.

 

             MPV (test code = 10.9 fL      9.8-13.0                  



             57300-7)                                            

 

             NRBC/100 WBC (test              See_Comment                [Automat

ed



             code = 7165114686)                                        message] 

The



                                                                 system which



                                                                 generated this



                                                                 result transmit

rodrick



                                                                 reference range

:



                                                                 0.0 - 10.0 /100



                                                                 WBCs. The



                                                                 reference range



                                                                 was not used to



                                                                 interpret this



                                                                 result as



                                                                 normal/abnormal

.

 

             NRBC x10^3 (test code <0.01        See_Comment                [Auto

mated



             = 4710829971)                                        message] The



                                                                 system which



                                                                 generated this



                                                                 result transmit

rodrick



                                                                 reference range

:



                                                                 10*3/?L. The



                                                                 reference range



                                                                 was not used to



                                                                 interpret this



                                                                 result as



                                                                 normal/abnormal

.

 

             GRAN MAT (NEUT) % 80.8 %                                 



             (test code = 770-8)                                        

 

             IMM GRAN % (test code 5.80 %                                 



             = 9533583588)                                        

 

             LYMPH % (test code = 7.4 %                                  



             736-9)                                              

 

             MONO % (test code = 4.9 %                                  



             5905-5)                                             

 

             EOS % (test code = 0.6 %                                  



             713-8)                                              

 

             BASO % (test code = 0.5 %                                  



             706-2)                                              

 

             GRAN MAT x10^3(ANC) 17.18 10*3/uL 1.99-6.95    H            



             (test code =                                        



             2815879439)                                         

 

             IMM GRAN x10^3 (test 1.23 10*3/uL 0.00-0.06    H            



             code = 7621971553)                                        

 

             LYMPH x10^3 (test code 1.57 10*3/uL 1.09-3.23                 



             = 731-0)                                            

 

             MONO x10^3 (test code 1.04 10*3/uL 0.36-1.02    H            



             = 742-7)                                            

 

             EOS x10^3 (test code = 0.13 10*3/uL 0.06-0.53                 



             711-2)                                              

 

             BASO x10^3 (test code 0.10 10*3/uL 0.01-0.09    H            



             = 704-7)                                            

 

             Lab Interpretation Abnormal                               



             (test code = 14295-8)                                        



[facility] METABOLIC PANEL (NA, K, CL, CO2, 
GLUCOSE, BUN, CREATININE, CA)2021 12:01:30





             Test Item    Value        Reference Range Interpretation Comments

 

             NA (test code = 135 mmol/L   135-145                   



             2947256726)                                         

 

             K (test code = 4.3 mmol/L   3.5-5.0                   



             5719708565)                                         

 

             CL (test code = 102 mmol/L                       



             7744661846)                                         

 

             CO2 TOTAL (test code = 27 mmol/L    23-31                     



             7953690685)                                         

 

             AGAP (test code =              2-16                      



             9077267348)                                         

 

             BUN (test code = 21 mg/dL     7-23                      



             1751625291)                                         

 

             GLUCOSE (test code = 99 mg/dL                         



             5696377595)                                         

 

             CREATININE (test code = 0.57 mg/dL   0.60-1.25    L            



             8100191910)                                         

 

             CALCIUM (test code = 8.3 mg/dL    8.6-10.6     L            



             6111382688)                                         

 

             eGFR Calculation              mL/min/1.73m2              



             (Non-)                                        



             (test code =                                        



             2288590023)                                         

 

             eGFR Calculation              mL/min/1.73m2              



             (African American)                                        



             (test code =                                        



             4780995576)                                         

 

             MISA (test code = MISA) Association of                           



                          Glomerular Filtration                           



                          Rate (GFR) and Staging                           



                          of Kidney Disease*                           



                          +---------------------                           



                          --+-------------------                           



                          --+-------------------                           



                          ------+| GFR                           



                          (mL/min/1.73 m2) ?|                           



                          With Kidney Damage ?|                           



                          ?Without Kidney                           



                          Damage+---------------                           



                          --------+-------------                           



                          --------+-------------                           



                          ------------+| ?>90 ?                           



                          ? ? ? ? ? ? ? ?|                           



                          ?Stage one ? ? ? ? ?|                           



                          ? Normal ? ? ? ? ? ? ?                           



                          ?+--------------------                           



                          ---+------------------                           



                          ---+------------------                           



                          -------+| ?60-89 ? ? ?                           



                          ? ? ? ? ?| ?Stage two                           



                          ? ? ? ? ?| ? Decreased                           



                          GFR ? ? ? ?                            



                          +---------------------                           



                          --+-------------------                           



                          --+-------------------                           



                          ------+| ?30-59 ? ? ?                           



                          ? ? ? ? ?| ?Stage                           



                          three ? ? ? ?| ? Stage                           



                          three ? ? ? ? ?                           



                          +---------------------                           



                          --+-------------------                           



                          --+-------------------                           



                          ------+| ?15-29 ? ? ?                           



                          ? ? ? ? ?| ?Stage four                           



                          ? ? ? ? | ? Stage four                           



                          ? ? ? ? ?                              



                          ?+--------------------                           



                          ---+------------------                           



                          ---+------------------                           



                          -------+| ?<15 (or                           



                          dialysis) ? ?| ?Stage                           



                          five ? ? ? ? | ? Stage                           



                          five ? ? ? ? ?                           



                          ?+--------------------                           



                          ---+------------------                           



                          ---+------------------                           



                          -------+ *Each stage                           



                          assumes the associated                           



                          GFR level has been in                           



                          effect for at least                           



                          three months. ?Stages                           



                          1 to 5, with or                           



                          without kidney                           



                          disease, indicate                           



                          chronic kidney                           



                          disease. Notes:                           



                          Determination of                           



                          stages one and two                           



                          (with eGFR                             



                          >59mL/min/1.73 m2)                           



                          requires estimation of                           



                          kidney damage for at                           



                          least three months as                           



                          defined by structural                           



                          or functional                           



                          abnormalities of the                           



                          kidney, manifested by                           



                          either:Pathological                           



                          abnormalities or                           



                          Markers of kidney                           



                          damage (including                           



                          abnormalities in the                           



                          composition of the                           



                          blood or urine or                           



                          abnormalities in                           



                          imaging tests).                           

 

             Lab Interpretation Abnormal                               



             (test code = 29296-8)                                        



[id number]





             Test Item    Value        Reference Range Interpretation Comments

 

             Blood Culture-Aerobic No organisms No growth                 Previo

us



             (test code = 17928-3) isolated                               prelim

inary



                                                                 verified result



                                                                 was Culture In



                                                                 Progress on



                                                                 3/1/2021 at 140

2



                                                                 CSTPrevious



                                                                 preliminary



                                                                 verified result



                                                                 was No growth a

t



                                                                 24 hours on



                                                                 3/2/2021 at 110

1



                                                                 CSTPrevious



                                                                 preliminary



                                                                 verified result



                                                                 was No growth a

t



                                                                 48 hours on



                                                                 3/3/2021 at 110

1



                                                                 CSTPrevious



                                                                 preliminary



                                                                 verified result



                                                                 was No growth a

t



                                                                 72 hours on



                                                                 3/4/2021 at 110

1



                                                                 CST

 

             Blood        No organisms No growth                 Previous



             Culture-Anaerobic isolated                               preliminar

y



             (test code = 51371-0)                                        verifi

ed result



                                                                 was Culture In



                                                                 Progress on



                                                                 3/1/2021 at 140

2



                                                                 CSTPrevious



                                                                 preliminary



                                                                 verified result



                                                                 was No growth a

t



                                                                 24 hours on



                                                                 3/2/2021 at 110

1



                                                                 CSTPrevious



                                                                 preliminary



                                                                 verified result



                                                                 was No growth a

t



                                                                 48 hours on



                                                                 3/3/2021 at 110

1



                                                                 CSTPrevious



                                                                 preliminary



                                                                 verified result



                                                                 was No growth a

t



                                                                 72 hours on



                                                                 3/4/2021 at 110

1



                                                                 CST

 

             Lab Interpretation Normal                                 



             (test code = 78838-4)                                        



[id number]00





             Test Item    Value        Reference Range Interpretation Comments

 

             Blood Culture-Aerobic No organisms No growth                 Previo

us



             (test code = 17928-3) isolated                               prelim

inary



                                                                 verified result



                                                                 was Culture In



                                                                 Progress on



                                                                 3/1/2021 at 140

2



                                                                 CSTPrevious



                                                                 preliminary



                                                                 verified result



                                                                 was No growth a

t



                                                                 24 hours on



                                                                 3/2/2021 at 110

1



                                                                 CSTPrevious



                                                                 preliminary



                                                                 verified result



                                                                 was No growth a

t



                                                                 48 hours on



                                                                 3/3/2021 at 110

1



                                                                 CSTPrevious



                                                                 preliminary



                                                                 verified result



                                                                 was No growth a

t



                                                                 72 hours on



                                                                 3/4/2021 at 110

1



                                                                 CST

 

             Blood        No organisms No growth                 Previous



             Culture-Anaerobic isolated                               preliminar

y



             (test code = 04334-2)                                        verifi

ed result



                                                                 was Culture In



                                                                 Progress on



                                                                 3/1/2021 at 140

2



                                                                 CSTPrevious



                                                                 preliminary



                                                                 verified result



                                                                 was No growth a

t



                                                                 24 hours on



                                                                 3/2/2021 at 110

1



                                                                 CSTPrevious



                                                                 preliminary



                                                                 verified result



                                                                 was No growth a

t



                                                                 48 hours on



                                                                 3/3/2021 at 110

1



                                                                 CSTPrevious



                                                                 preliminary



                                                                 verified result



                                                                 was No growth a

t



                                                                 72 hours on



                                                                 3/4/2021 at 110

1



                                                                 CST

 

             Lab Interpretation Normal                                 



             (test code = 30603-5)                                        



[id number]





             Test Item    Value        Reference Range Interpretation Comments

 

             CRP (test code = 7443497346) 18.1 mg/dL   <0.8         H           

 

 

             Lab Interpretation (test code = Abnormal                           

    



             78305-9)                                            



[facility]





             Test Item    Value        Reference Range Interpretation Comments

 

             Procalcitonin (test 0.41 ng/mL   <0.07        H            



             code = 0609918002)                                        

 

             MISA (test code = MISA) INTERPRETATION OF                           



                          PROCALCITONIN RESULTS IN                           



                          ADULTS >= 18 YEARS OF                           



                          AGE Initiation and                           



                          discontinuation of                           



                          antibiotics on patients                           



                          with suspected or                           



                          confirmed Lower                           



                          Respiratory Tract                           



                          Infection in Adults >=                           



                          18 years of age.                           



                          +--------------+--------                           



                          --------+---------------                           



                          +-----------------------                           



                          -----+|Procalcitonin                           



                          |Interpretation                           



                          ?|Antibiotic ? ?                           



                          |Considerations ? ? ? ?                           



                          ? ? ? |ng/mL ? ? ? ? | ?                           



                          ? ? ? ? ? ?                            



                          ?|recommendation | ? ? ?                           



                          ? ? ? ? ? ? ? ? ? ? ?                           



                          +--------------+--------                           



                          --------+---------------                           



                          +-----------------------                           



                          -----+| <0.1 ? ? ? ? |                           



                          Bacterial ? ? ?|                           



                          Strongly ? ? ?| ? ? ? ?                           



                          ? ? ? ? ? ? ? ? ? ? | ?                           



                          ? ? ? ? ? ?| infection                           



                          very | discouraged ? |                           



                          Overruling: ? ? ? ? ? ?                           



                          ? ? | ? ? ? ? ? ? ?|                           



                          unlikely ? ? ? | ? ? ? ?                           



                          ? ? ? | ? Clinically                           



                          unstable ? ? ?                           



                          +--------------+--------                           



                          --------+---------------                           



                          + ? High risk for                           



                          adverse ? ? | <0.25 ? ?                           



                          ? ?| Bacterial ? ? ?|                           



                          Discouraged ? | ?                           



                          outcome ? ? ? ? ? ? ? ?                           



                          ? | ? ? ? ? ? ? ?|                           



                          infection ? ? ?| ? ? ? ?                           



                          ? ? ? | ? SEE IMPORTANT                           



                          NOTE ? ? ? ?| ? ? ? ? ?                           



                          ? ?| unlikely ? ? ? | ?                           



                          ? ? ? ? ? ? | ? ? ? ? ?                           



                          ? ? ? ? ? ? ? ? ?                           



                          +--------------+--------                           



                          --------+---------------                           



                          +-----------------------                           



                          -----+| >=0.25 ? ? ? |                           



                          Bacterial ? ? ?|                           



                          Encouraged ? ?| ? ? ? ?                           



                          ? ? ? ? ? ? ? ? ? ? | ?                           



                          ? ? ? ? ? ?| infection ?                           



                          ? ?| ? ? ? ? ? ? ? | ? ?                           



                          ? ? ? ? ? ? ? ? ? ? ? ?                           



                          | ? ? ? ? ? ? ?| likely                           



                          ? ? ? ? | ? ? ? ? ? ? ?                           



                          | Consider treatment                           



                          failure                                



                          ?+--------------+-------                           



                          ---------+--------------                           



                          -+ if levels does not                           



                          decrease | >0.5 ? ? ? ?                           



                          | Bacterial ? ? ?|                           



                          Strongly ? ? ?|                           



                          appropriately ? ? ? ? ?                           



                          ? ? | ? ? ? ? ? ? ?|                           



                          infection very |                           



                          encouraged ? ?| ? ? ? ?                           



                          ? ? ? ? ? ? ? ? ? ? | ?                           



                          ? ? ? ? ? ?| likely ? ?                           



                          ? ? | ? ? ? ? ? ? ? | ?                           



                          ? ? ? ? ? ? ? ? ? ? ? ?                           



                          ?                                      



                          +--------------+--------                           



                          --------+---------------                           



                          +-----------------------                           



                          -----+ Discontinuation                           



                          of antibiotics in                           



                          high-acuity patients                           



                          with suspected or                           



                          confirmed sepsis in                           



                          Adults >= 18 years of                           



                          age.                                   



                          +--------------+--------                           



                          --------+---------------                           



                          +-----------------------                           



                          -----+|Procalcitonin                           



                          |Interpretation                           



                          ?|Antibiotic ? ?                           



                          |Considerations ? ? ? ?                           



                          ? ? ? |ng/mL ? ? ? ? | ?                           



                          ? ? ? ? ? ?                            



                          ?|recommendation | ? ? ?                           



                          ? ? ? ? ? ? ? ? ? ? ?                           



                          +--------------+--------                           



                          --------+---------------                           



                          +-----------------------                           



                          -----+| <0.25 ? ? ? ?|                           



                          Bacterial ? ? ?|                           



                          Strongly ? ? ?| ? ? ? ?                           



                          ? ? ? ? ? ? ? ? ? ? | ?                           



                          ? ? ? ? ? ?| infection                           



                          very | discouraged ? |                           



                          Overruling: ? ? ? ? ? ?                           



                          ? ? | ? ? ? ? ? ? ?|                           



                          unlikely ? ? ? | ? ? ? ?                           



                          ? ? ? | ? Clinically                           



                          unstable ? ? ?                           



                          +--------------+--------                           



                          --------+---------------                           



                          + ? High risk for                           



                          adverse ? ? | <0.5 or                           



                          drop | Bacterial ? ? ?|                           



                          Discouraged ? | ?                           



                          outcome ? ? ? ? ? ? ? ?                           



                          ? | >80% from ? ?|                           



                          infection ? ? ?| ? ? ? ?                           



                          ? ? ? | ? SEE IMPORTANT                           



                          NOTE ? ? ? ?| highest                           



                          PCT ?| unlikely ? ? ? |                           



                          ? ? ? ? ? ? ? | ? ? ? ?                           



                          ? ? ? ? ? ? ? ? ? ? |                           



                          level ? ? ? ?| ? ? ? ? ?                           



                          ? ? ?| ? ? ? ? ? ? ? | ?                           



                          ? ? ? ? ? ? ? ? ? ? ? ?                           



                          ?                                      



                          +--------------+--------                           



                          --------+---------------                           



                          +-----------------------                           



                          -----+| >=0.5 ? ? ? ?|                           



                          Bacterial ? ? ?|                           



                          Encouraged ? ?| ? ? ? ?                           



                          ? ? ? ? ? ? ? ? ? ? | ?                           



                          ? ? ? ? ? ?| infection ?                           



                          ? ?| ? ? ? ? ? ? ? | ? ?                           



                          ? ? ? ? ? ? ? ? ? ? ? ?                           



                          | ? ? ? ? ? ? ?| likely                           



                          ? ? ? ? | ? ? ? ? ? ? ?                           



                          | Consider treatment                           



                          failure                                



                          ?+--------------+-------                           



                          ---------+--------------                           



                          -+ if levels does not                           



                          decrease | >1.0 ? ? ? ?                           



                          | Bacterial ? ? ?|                           



                          Strongly ? ? ?|                           



                          appropriately ? ? ? ? ?                           



                          ? ? | ? ? ? ? ? ? ?|                           



                          infection very |                           



                          encouraged ? ?| ? ? ? ?                           



                          ? ? ? ? ? ? ? ? ? ? | ?                           



                          ? ? ? ? ? ?| likely ? ?                           



                          ? ? | ? ? ? ? ? ? ? | ?                           



                          ? ? ? ? ? ? ? ? ? ? ? ?                           



                          ?                                      



                          +--------------+--------                           



                          --------+---------------                           



                          +-----------------------                           



                          -----+ Percentage of                           



                          drop of Procalcitonin                           



                          calculation for                           



                          Discontinuation of                           



                          antibiotics in                           



                          high-acuity patients                           



                          with suspected or                           



                          confirmed sepsis in                           



                          Adults >= 18 years of                           



                          age.  ? ? ? ? ? ? ? ? ?                           



                          ? ? Procalcitonin                           



                          highest{}-Procalcitonin                           



                          current{}Delta                           



                          Procalcitonin =                           



                          ________________________                           



                          _______________________                           



                          x100%  ? ? ? ? ? ? ? ? ?                           



                          ? ? ? ? ? ? ?                           



                          Procalcitonin current {}                           



                          IMPORTANT NOTE:                           



                          Procalcitonin may be                           



                          elevated without                           



                          bacterial infection by                           



                          physiologic stress                           



                          related to trauma,                           



                          burns, chronic dialysis,                           



                          metastatic cancer,                           



                          surgery in the past                           



                          seven days, malaria,                           



                          some fungal infections,                           



                          and some forms of                           



                          vasculitis. The                           



                          interpretation algorithm                           



                          may not apply to                           



                          patients with                           



                          immunosuppression                           



                          (equivalent of >10 mg of                           



                          prednisone daily), HIV                           



                          with CD4 cell count <                           



                          350 cells/mm3, active                           



                          malignancy on systemic                           



                          chemotherapy, solid                           



                          organ transplant or                           



                          hematopoietic stem cell                           



                          transplantation, or                           



                          hospital acquired                           



                          pneumonia. Additionally,                           



                          some clinical trials of                           



                          procalcitonin have                           



                          excluded patients with                           



                          shock requiring                           



                          vasopressor use, acute                           



                          respiratory failure                           



                          requiring mechanical                           



                          ventilation, or those                           



                          with known lung                           



                          abscess/empyema. For                           



                          further information                           



                          please refer                           



                          to:[email or web address]                           



                          [email or web address]                           

 

             Lab Interpretation Abnormal                               



             (test code = 19140-6)                                        



[facility] WITH [id number] 14:42:00





             Test Item    Value        Reference Range Interpretation Comments

 

             WBC (test code =              See_Comment  H             [Automated



             9190-2)                                             message] The



                                                                 system which



                                                                 generated this



                                                                 result transmit

rodrick



                                                                 reference range

:



                                                                 4.20 - 10.70



                                                                 10*3/?L. The



                                                                 reference range



                                                                 was not used to



                                                                 interpret this



                                                                 result as



                                                                 normal/abnormal

.

 

             RBC (test code =              See_Comment                [Automated



             789-8)                                              message] The



                                                                 system which



                                                                 generated this



                                                                 result transmit

rodrick



                                                                 reference range

:



                                                                 4.26 - 5.52



                                                                 10*6/?L. The



                                                                 reference range



                                                                 was not used to



                                                                 interpret this



                                                                 result as



                                                                 normal/abnormal

.

 

             HGB (test code = 15.5 g/dL    12.2-16.4                 



             718-7)                                              

 

             HCT (test code = 44.5 %       38.4-49.3                 



             4544-3)                                             

 

             MCV (test code = 83.2 fL      81.7-95.6                 



             787-2)                                              

 

             MCH (test code = 29.0 pg      26.1-32.7                 



             785-6)                                              

 

             MCHC (test code = 34.8 g/dL    31.2-35.0                 



             786-4)                                              

 

             RDW-SD (test code = 41.8 fL      38.5-51.6                 



             33049-9)                                            

 

             RDW-CV (test code = 13.8 %       12.1-15.4                 



             788-0)                                              

 

             PLT (test code =              See_Comment                [Automated



             777-3)                                              message] The



                                                                 system which



                                                                 generated this



                                                                 result transmit

rodrick



                                                                 reference range

:



                                                                 150 - 328 10*3/

?L.



                                                                 The reference



                                                                 range was not u

sed



                                                                 to interpret th

is



                                                                 result as



                                                                 normal/abnormal

.

 

             MPV (test code = 9.8 fL       9.8-13.0                  



             07979-5)                                            

 

             NRBC/100 WBC (test              See_Comment                [Automat

ed



             code = [id number])                                        message] 

The



                                                                 system which



                                                                 generated this



                                                                 result transmit

rodrick



                                                                 reference range

:



                                                                 0.0 - 10.0 /100



                                                                 WBCs. The



                                                                 reference range



                                                                 was not used to



                                                                 interpret this



                                                                 result as



                                                                 normal/abnormal

.

 

             NRBC x10^3 (test code <0.01        See_Comment                [Auto

mated



             = [id number])                                        message] The



                                                                 system which



                                                                 generated this



                                                                 result transmit

rodrick



                                                                 reference range

:



                                                                 10*3/?L. The



                                                                 reference range



                                                                 was not used to



                                                                 interpret this



                                                                 result as



                                                                 normal/abnormal

.

 

             GRAN MAT (NEUT) % 68.9 %                                 



             (test code = 770-8)                                        

 

             IMM GRAN % (test code 2.40 %                                 



             = 5531815890)                                        

 

             LYMPH % (test code = 8.1 %                                  



             736-9)                                              

 

             MONO % (test code = 11.7 %                                 



             5905-5)                                             

 

             EOS % (test code = 8.3 %                                  



             713-8)                                              

 

             BASO % (test code = 0.6 %                                  



             706-2)                                              

 

             GRAN MAT x10^3(ANC) 10.53 10*3/uL 1.99-6.95    H            



             (test code =                                        



             1214101773)                                         

 

             IMM GRAN x10^3 (test 0.36 10*3/uL 0.00-0.06    H            



             code = 0768895369)                                        

 

             LYMPH x10^3 (test code 1.23 10*3/uL 1.09-3.23                 



             = 731-0)                                            

 

             MONO x10^3 (test code 1.79 10*3/uL 0.36-1.02    H            



             = 742-7)                                            

 

             EOS x10^3 (test code = 1.26 10*3/uL 0.06-0.53    H            



             711-2)                                              

 

             BASO x10^3 (test code 0.09 10*3/uL 0.01-0.09                 



             = 704-7)                                            

 

             BANDS (test code = Increased                 A            



             1607153047)                                         

 

             Lab Interpretation Abnormal                               



             (test code = 11893-4)                                        



[facility]. METABOLIC PANEL (49541)2021 
13:01:00





             Test Item    Value        Reference Range Interpretation Comments

 

             NA (test code = 134 mmol/L   135-145      L            



             4782244951)                                         

 

             K (test code = 4.2 mmol/L   3.5-5.0                   



             3641289406)                                         

 

             CL (test code = 100 mmol/L                       



             8588778859)                                         

 

             CO2 TOTAL (test code = 28 mmol/L    23-31                     



             9496438560)                                         

 

             AGAP (test code =              2-16                      



             9316191936)                                         

 

             BUN (test code = 22 mg/dL     7-23                      



             4186898599)                                         

 

             GLUCOSE (test code = 100 mg/dL                        



             7393459689)                                         

 

             CREATININE (test code = 0.61 mg/dL   0.60-1.25                 



             7734769093)                                         

 

             TOTAL BILI (test code = 0.7 mg/dL    0.1-1.1                   



             4803852513)                                         

 

             CALCIUM (test code = 8.4 mg/dL    8.6-10.6     L            



             1981778037)                                         

 

             T PROTEIN (test code = 5.6 g/dL     6.3-8.2      L            



             3524404666)                                         

 

             ALBUMIN (test code = 3.0 g/dL     3.5-5.0      L            



             8324846741)                                         

 

             ALK PHOS (test code = 87 U/L                           



             8828187186)                                         

 

             ALTv (test code = 60 U/L       5-50         H            



             1742-6)                                             

 

             AST(SGOT) (test code = 38 U/L       13-40                     



             0034263330)                                         

 

             eGFR Calculation              mL/min/1.73m2              



             (Non-)                                        



             (test code =                                        



             4871925604)                                         

 

             eGFR Calculation              mL/min/1.73m2              



             (African American)                                        



             (test code =                                        



             0287250359)                                         

 

             MISA (test code = MISA) Association of                           



                          Glomerular Filtration                           



                          Rate (GFR) and Staging                           



                          of Kidney Disease*                           



                          +---------------------                           



                          --+-------------------                           



                          --+-------------------                           



                          ------+| GFR                           



                          (mL/min/1.73 m2) ?|                           



                          With Kidney Damage ?|                           



                          ?Without Kidney                           



                          Damage+---------------                           



                          --------+-------------                           



                          --------+-------------                           



                          ------------+| ?>90 ?                           



                          ? ? ? ? ? ? ? ?|                           



                          ?Stage one ? ? ? ? ?|                           



                          ? Normal ? ? ? ? ? ? ?                           



                          ?+--------------------                           



                          ---+------------------                           



                          ---+------------------                           



                          -------+| ?60-89 ? ? ?                           



                          ? ? ? ? ?| ?Stage two                           



                          ? ? ? ? ?| ? Decreased                           



                          GFR ? ? ? ?                            



                          +---------------------                           



                          --+-------------------                           



                          --+-------------------                           



                          ------+| ?30-59 ? ? ?                           



                          ? ? ? ? ?| ?Stage                           



                          three ? ? ? ?| ? Stage                           



                          three ? ? ? ? ?                           



                          +---------------------                           



                          --+-------------------                           



                          --+-------------------                           



                          ------+| ?15-29 ? ? ?                           



                          ? ? ? ? ?| ?Stage four                           



                          ? ? ? ? | ? Stage four                           



                          ? ? ? ? ?                              



                          ?+--------------------                           



                          ---+------------------                           



                          ---+------------------                           



                          -------+| ?<15 (or                           



                          dialysis) ? ?| ?Stage                           



                          five ? ? ? ? | ? Stage                           



                          five ? ? ? ? ?                           



                          ?+--------------------                           



                          ---+------------------                           



                          ---+------------------                           



                          -------+ *Each stage                           



                          assumes the associated                           



                          GFR level has been in                           



                          effect for at least                           



                          three months. ?Stages                           



                          1 to 5, with or                           



                          without kidney                           



                          disease, indicate                           



                          chronic kidney                           



                          disease. Notes:                           



                          Determination of                           



                          stages one and two                           



                          (with eGFR                             



                          >59mL/min/1.73 m2)                           



                          requires estimation of                           



                          kidney damage for at                           



                          least three months as                           



                          defined by structural                           



                          or functional                           



                          abnormalities of the                           



                          kidney, manifested by                           



                          either:Pathological                           



                          abnormalities or                           



                          Markers of kidney                           



                          damage (including                           



                          abnormalities in the                           



                          composition of the                           



                          blood or urine or                           



                          abnormalities in                           



                          imaging tests).                           

 

             Lab Interpretation Abnormal                               



             (test code = 71936-0)                                        



[facility]





             Test Item    Value        Reference Range Interpretation Comments

 

             POCT GLU (test code = 8267026647) 98 mg/dL                   

      

 

             Lab Interpretation (test code = Normal                             

    



             36773-5)                                            



[id number]





             Test Item    Value        Reference Range Interpretation Comments

 

             TSH (test code =              See_Comment               Biotin has 

been



             4826714628)                                         reported to cau

se a



                                                                 negative bias,



                                                                 interpret resul

ts



                                                                 relative to pat

shlomo's



                                                                 use of biotin.



                                                                 [Automated mess

age]



                                                                 The system whic

h



                                                                 generated this 

result



                                                                 transmitted ref

erence



                                                                 range: 0.45 - 4

.70



                                                                 mIU/L. The refe

rence



                                                                 range was not u

sed to



                                                                 interpret this 

result



                                                                 as normal/abnor

mal.

 

             Lab Interpretation (test Normal                                 



             code = 64277-6)                                        



[id number]





             Test Item    Value        Reference Range Interpretation Comments

 

             CRP (test code = 0441277999) 35.9 mg/dL   <0.8         H           

 

 

             Lab Interpretation (test code = Abnormal                           

    



             28260-0)                                            



[facility] with [id number] 13:01:00





             Test Item    Value        Reference Range Interpretation Comments

 

             WBC (test code =              See_Comment  H             [Automated



             8890-2)                                             message] The



                                                                 system which



                                                                 generated this



                                                                 result transmit

rodrick



                                                                 reference range

:



                                                                 4.20 - 10.70



                                                                 10*3/?L. The



                                                                 reference range



                                                                 was not used to



                                                                 interpret this



                                                                 result as



                                                                 normal/abnormal

.

 

             RBC (test code =              See_Comment                [Automated



             139-8)                                              message] The



                                                                 system which



                                                                 generated this



                                                                 result transmit

rodrick



                                                                 reference range

:



                                                                 4.26 - 5.52



                                                                 10*6/?L. The



                                                                 reference range



                                                                 was not used to



                                                                 interpret this



                                                                 result as



                                                                 normal/abnormal

.

 

             HGB (test code = 14.7 g/dL    12.2-16.4                 



             718-7)                                              

 

             HCT (test code = 42.9 %       38.4-49.3                 



             4544-3)                                             

 

             MCV (test code = 84.0 fL      81.7-95.6                 



             787-2)                                              

 

             MCH (test code = 28.8 pg      26.1-32.7                 



             785-6)                                              

 

             MCHC (test code = 34.3 g/dL    31.2-35.0                 



             786-4)                                              

 

             RDW-SD (test code = 42.5 fL      38.5-51.6                 



             58923-0)                                            

 

             RDW-CV (test code = 13.7 %       12.1-15.4                 



             788-0)                                              

 

             PLT (test code =              See_Comment                [Automated



             777-3)                                              message] The



                                                                 system which



                                                                 generated this



                                                                 result transmit

rodrick



                                                                 reference range

:



                                                                 150 - 328 10*3/

?L.



                                                                 The reference



                                                                 range was not u

sed



                                                                 to interpret th

is



                                                                 result as



                                                                 normal/abnormal

.

 

             MPV (test code = 10.4 fL      9.8-13.0                  



             66564-2)                                            

 

             NRBC/100 WBC (test              See_Comment                [Automat

ed



             code = 1042564093)                                        message] 

The



                                                                 system which



                                                                 generated this



                                                                 result transmit

rodrick



                                                                 reference range

:



                                                                 0.0 - 10.0 /100



                                                                 WBCs. The



                                                                 reference range



                                                                 was not used to



                                                                 interpret this



                                                                 result as



                                                                 normal/abnormal

.

 

             NRBC x10^3 (test code <0.01        See_Comment                [Auto

mated



             = 7756496572)                                        message] The



                                                                 system which



                                                                 generated this



                                                                 result transmit

rodrick



                                                                 reference range

:



                                                                 10*3/?L. The



                                                                 reference range



                                                                 was not used to



                                                                 interpret this



                                                                 result as



                                                                 normal/abnormal

.

 

             GRAN MAT (NEUT) % 85.9 %                                 



             (test code = 770-8)                                        

 

             IMM GRAN % (test code 1.20 %                                 



             = 1474199937)                                        

 

             LYMPH % (test code = 4.4 %                                  



             736-9)                                              

 

             MONO % (test code = 7.9 %                                  



             5905-5)                                             

 

             EOS % (test code = 0.3 %                                  



             713-8)                                              

 

             BASO % (test code = 0.3 %                                  



             706-2)                                              

 

             GRAN MAT x10^3(ANC) 15.92 10*3/uL 1.99-6.95    H            



             (test code =                                        



             0343068033)                                         

 

             IMM GRAN x10^3 (test 0.23 10*3/uL 0.00-0.06    H            



             code = 2185020717)                                        

 

             LYMPH x10^3 (test code 0.81 10*3/uL 1.09-3.23    L            



             = 731-0)                                            

 

             MONO x10^3 (test code 1.46 10*3/uL 0.36-1.02    H            



             = 742-7)                                            

 

             EOS x10^3 (test code = 0.05 10*3/uL 0.06-0.53    L            



             711-2)                                              

 

             BASO x10^3 (test code 0.05 10*3/uL 0.01-0.09                 



             = 704-7)                                            

 

             Lab Interpretation Abnormal                               



             (test code = 78369-1)                                        



[facility] Metabolic Panel (NA, K, CL, CO2, 
GLUCOSE, BUN, CREATININE, CA)2021 11:24:00





             Test Item    Value        Reference Range Interpretation Comments

 

             NA (test code = 133 mmol/L   135-145      L            



             7150655847)                                         

 

             K (test code = 4.2 mmol/L   3.5-5.0                   



             7175447537)                                         

 

             CL (test code = 99 mmol/L                        



             1524273567)                                         

 

             CO2 TOTAL (test code = 26 mmol/L    23-31                     



             0302900121)                                         

 

             AGAP (test code =              2-16                      



             6618724581)                                         

 

             BUN (test code = 22 mg/dL     7-23                      



             1469263497)                                         

 

             GLUCOSE (test code = 92 mg/dL                         



             2605387180)                                         

 

             CREATININE (test code = 1.04 mg/dL   0.60-1.25                 



             8474098964)                                         

 

             CALCIUM (test code = 8.4 mg/dL    8.6-10.6     L            



             3201322249)                                         

 

             eGFR Calculation              mL/min/1.73m2              



             (Non-)                                        



             (test code =                                        



             2608343383)                                         

 

             eGFR Calculation              mL/min/1.73m2              



             (African American)                                        



             (test code =                                        



             5381981377)                                         

 

             MISA (test code = MISA) Association of                           



                          Glomerular Filtration                           



                          Rate (GFR) and Staging                           



                          of Kidney Disease*                           



                          +---------------------                           



                          --+-------------------                           



                          --+-------------------                           



                          ------+| GFR                           



                          (mL/min/1.73 m2) ?|                           



                          With Kidney Damage ?|                           



                          ?Without Kidney                           



                          Damage+---------------                           



                          --------+-------------                           



                          --------+-------------                           



                          ------------+| ?>90 ?                           



                          ? ? ? ? ? ? ? ?|                           



                          ?Stage one ? ? ? ? ?|                           



                          ? Normal ? ? ? ? ? ? ?                           



                          ?+--------------------                           



                          ---+------------------                           



                          ---+------------------                           



                          -------+| ?60-89 ? ? ?                           



                          ? ? ? ? ?| ?Stage two                           



                          ? ? ? ? ?| ? Decreased                           



                          GFR ? ? ? ?                            



                          +---------------------                           



                          --+-------------------                           



                          --+-------------------                           



                          ------+| ?30-59 ? ? ?                           



                          ? ? ? ? ?| ?Stage                           



                          three ? ? ? ?| ? Stage                           



                          three ? ? ? ? ?                           



                          +---------------------                           



                          --+-------------------                           



                          --+-------------------                           



                          ------+| ?15-29 ? ? ?                           



                          ? ? ? ? ?| ?Stage four                           



                          ? ? ? ? | ? Stage four                           



                          ? ? ? ? ?                              



                          ?+--------------------                           



                          ---+------------------                           



                          ---+------------------                           



                          -------+| ?<15 (or                           



                          dialysis) ? ?| ?Stage                           



                          five ? ? ? ? | ? Stage                           



                          five ? ? ? ? ?                           



                          ?+--------------------                           



                          ---+------------------                           



                          ---+------------------                           



                          -------+ *Each stage                           



                          assumes the associated                           



                          GFR level has been in                           



                          effect for at least                           



                          three months. ?Stages                           



                          1 to 5, with or                           



                          without kidney                           



                          disease, indicate                           



                          chronic kidney                           



                          disease. Notes:                           



                          Determination of                           



                          stages one and two                           



                          (with eGFR                             



                          >59mL/min/1.73 m2)                           



                          requires estimation of                           



                          kidney damage for at                           



                          least three months as                           



                          defined by structural                           



                          or functional                           



                          abnormalities of the                           



                          kidney, manifested by                           



                          either:Pathological                           



                          abnormalities or                           



                          Markers of kidney                           



                          damage (including                           



                          abnormalities in the                           



                          composition of the                           



                          blood or urine or                           



                          abnormalities in                           



                          imaging tests).                           

 

             Lab Interpretation Abnormal                               



             (test code = 94572-0)                                        



[id number] 
08:59:00





             Test Item    Value        Reference Range Interpretation Comments

 

             Legionella Urinary Negative     Negative                  



             Antigen (test code =                                        



             1594853502)                                         

 

             MISA (test code = MISA) Negative for L.                           



                          pneumophilia                           



                          serogroup I antigen                           



                          in urine suggesting                           



                          no recent or current                           



                          infection. Infection                           



                          due to Legionella                           



                          cannot be ruled out                           



                          since other                            



                          serogroups and                           



                          species may cause                           



                          disease. Furthermore,                           



                          antigens may not be                           



                          present in urine                           



                          during early stage of                           



                          infection, or the                           



                          level of antigen                           



                          present in urine may                           



                          be below the                           



                          detection limit of                           



                          the test.                              

 

             Lab Interpretation (test Normal                                 



             code = 26033-3)                                        



[id number]56[facility]





             Test Item    Value        Reference Range Interpretation Comments

 

             S. pneumoniae antigen (test code = Negative     Negative           

       



             [id number])                                         

 

             Lab Interpretation (test code = Normal                             

    



             36075-3)                                            



[facility]





             Test Item    Value        Reference Range Interpretation Comments

 

             APPEARANCE (test code Slightly Cloudy Clear        A            



             = 8963568014)                                        

 

             COLOR (test code = Yellow       Yellow                    



             5743708484)                                         

 

             PH (test code =              4.8-8.0                   



             1593436516)                                         

 

             SP GRAVITY (test code              1.003-1.030               



             = 7518600712)                                        

 

             GLU U QUAL (test code 100 mg/dL    Negative     A            



             = 7383040862)                                        

 

             BLOOD (test code = Small        Negative     A            



             3281489849)                                         

 

             KETONES (test code = Negative     Negative                  



             1622021615)                                         

 

             PROTEIN (test code = 100 mg/dL    Negative     A            



             2887-8)                                             

 

             UROBILIN (test code = 1.0 mg/dL    See_Comment                [Auto

mated



             7963347877)                                         message] The



                                                                 system which



                                                                 generated this



                                                                 result transmit

rodrick



                                                                 reference range

:



                                                                 0-1.0 mg/dL. Th

e



                                                                 reference range



                                                                 was not used to



                                                                 interpret this



                                                                 result as



                                                                 normal/abnormal

.

 

             BILIRUBIN (test code Negative     Negative                  



             = 4474135240)                                        

 

             NITRITE (test code = Negative     Negative                  



             8786382787)                                         

 

             LEUK CLAYTON (test code Negative     Negative                  



             = 5353661876)                                        

 

             RBC/HPF (test code =              See_Comment  H             [Autom

ated



             7712838123)                                         message] The



                                                                 system which



                                                                 generated this



                                                                 result transmit

rodrick



                                                                 reference range

: 0



                                                                 - 3 HPF. The



                                                                 reference range



                                                                 was not used to



                                                                 interpret this



                                                                 result as



                                                                 normal/abnormal

.

 

             WBC/HPF (test code =              See_Comment                [Autom

ated



             0257878696)                                         message] The



                                                                 system which



                                                                 generated this



                                                                 result transmit

rodrick



                                                                 reference range

: 0



                                                                 - 5 HPF. The



                                                                 reference range



                                                                 was not used to



                                                                 interpret this



                                                                 result as



                                                                 normal/abnormal

.

 

             BACTERIA (test code = Few          Negative     A            



             5859534613)                                         

 

             MUCOUS (test code = Marked       Negative LPF A            



             6795897428)                                         

 

             SQ EPITH (test code =              HPF                       



             4555137995)                                         

 

             GRAN CASTS (test code              See_Comment  H             [Auto

mated



             = 3279700610)                                        message] The



                                                                 system which



                                                                 generated this



                                                                 result transmit

rodrick



                                                                 reference range

:



                                                                 <=1 LPF. The



                                                                 reference range



                                                                 was not used to



                                                                 interpret this



                                                                 result as



                                                                 normal/abnormal

.

 

             Lab Interpretation Abnormal                               



             (test code = 73714-8)                                        



[facility]





             Test Item    Value        Reference Range Interpretation Comments

 

             Procalcitonin (test 1.37 ng/mL   <0.07        H            



             code = 6328472647)                                        

 

             MISA (test code = MISA) INTERPRETATION OF                           



                          PROCALCITONIN RESULTS IN                           



                          ADULTS >= 18 YEARS OF                           



                          AGE Initiation and                           



                          discontinuation of                           



                          antibiotics on patients                           



                          with suspected or                           



                          confirmed Lower                           



                          Respiratory Tract                           



                          Infection in Adults >=                           



                          18 years of age.                           



                          +--------------+--------                           



                          --------+---------------                           



                          +-----------------------                           



                          -----+|Procalcitonin                           



                          |Interpretation                           



                          ?|Antibiotic ? ?                           



                          |Considerations ? ? ? ?                           



                          ? ? ? |ng/mL ? ? ? ? | ?                           



                          ? ? ? ? ? ?                            



                          ?|recommendation | ? ? ?                           



                          ? ? ? ? ? ? ? ? ? ? ?                           



                          +--------------+--------                           



                          --------+---------------                           



                          +-----------------------                           



                          -----+| <0.1 ? ? ? ? |                           



                          Bacterial ? ? ?|                           



                          Strongly ? ? ?| ? ? ? ?                           



                          ? ? ? ? ? ? ? ? ? ? | ?                           



                          ? ? ? ? ? ?| infection                           



                          very | discouraged ? |                           



                          Overruling: ? ? ? ? ? ?                           



                          ? ? | ? ? ? ? ? ? ?|                           



                          unlikely ? ? ? | ? ? ? ?                           



                          ? ? ? | ? Clinically                           



                          unstable ? ? ?                           



                          +--------------+--------                           



                          --------+---------------                           



                          + ? High risk for                           



                          adverse ? ? | <0.25 ? ?                           



                          ? ?| Bacterial ? ? ?|                           



                          Discouraged ? | ?                           



                          outcome ? ? ? ? ? ? ? ?                           



                          ? | ? ? ? ? ? ? ?|                           



                          infection ? ? ?| ? ? ? ?                           



                          ? ? ? | ? SEE IMPORTANT                           



                          NOTE ? ? ? ?| ? ? ? ? ?                           



                          ? ?| unlikely ? ? ? | ?                           



                          ? ? ? ? ? ? | ? ? ? ? ?                           



                          ? ? ? ? ? ? ? ? ?                           



                          +--------------+--------                           



                          --------+---------------                           



                          +-----------------------                           



                          -----+| >=0.25 ? ? ? |                           



                          Bacterial ? ? ?|                           



                          Encouraged ? ?| ? ? ? ?                           



                          ? ? ? ? ? ? ? ? ? ? | ?                           



                          ? ? ? ? ? ?| infection ?                           



                          ? ?| ? ? ? ? ? ? ? | ? ?                           



                          ? ? ? ? ? ? ? ? ? ? ? ?                           



                          | ? ? ? ? ? ? ?| likely                           



                          ? ? ? ? | ? ? ? ? ? ? ?                           



                          | Consider treatment                           



                          failure                                



                          ?+--------------+-------                           



                          ---------+--------------                           



                          -+ if levels does not                           



                          decrease | >0.5 ? ? ? ?                           



                          | Bacterial ? ? ?|                           



                          Strongly ? ? ?|                           



                          appropriately ? ? ? ? ?                           



                          ? ? | ? ? ? ? ? ? ?|                           



                          infection very |                           



                          encouraged ? ?| ? ? ? ?                           



                          ? ? ? ? ? ? ? ? ? ? | ?                           



                          ? ? ? ? ? ?| likely ? ?                           



                          ? ? | ? ? ? ? ? ? ? | ?                           



                          ? ? ? ? ? ? ? ? ? ? ? ?                           



                          ?                                      



                          +--------------+--------                           



                          --------+---------------                           



                          +-----------------------                           



                          -----+ Discontinuation                           



                          of antibiotics in                           



                          high-acuity patients                           



                          with suspected or                           



                          confirmed sepsis in                           



                          Adults >= 18 years of                           



                          age.                                   



                          +--------------+--------                           



                          --------+---------------                           



                          +-----------------------                           



                          -----+|Procalcitonin                           



                          |Interpretation                           



                          ?|Antibiotic ? ?                           



                          |Considerations ? ? ? ?                           



                          ? ? ? |ng/mL ? ? ? ? | ?                           



                          ? ? ? ? ? ?                            



                          ?|recommendation | ? ? ?                           



                          ? ? ? ? ? ? ? ? ? ? ?                           



                          +--------------+--------                           



                          --------+---------------                           



                          +-----------------------                           



                          -----+| <0.25 ? ? ? ?|                           



                          Bacterial ? ? ?|                           



                          Strongly ? ? ?| ? ? ? ?                           



                          ? ? ? ? ? ? ? ? ? ? | ?                           



                          ? ? ? ? ? ?| infection                           



                          very | discouraged ? |                           



                          Overruling: ? ? ? ? ? ?                           



                          ? ? | ? ? ? ? ? ? ?|                           



                          unlikely ? ? ? | ? ? ? ?                           



                          ? ? ? | ? Clinically                           



                          unstable ? ? ?                           



                          +--------------+--------                           



                          --------+---------------                           



                          + ? High risk for                           



                          adverse ? ? | <0.5 or                           



                          drop | Bacterial ? ? ?|                           



                          Discouraged ? | ?                           



                          outcome ? ? ? ? ? ? ? ?                           



                          ? | >80% from ? ?|                           



                          infection ? ? ?| ? ? ? ?                           



                          ? ? ? | ? SEE IMPORTANT                           



                          NOTE ? ? ? ?| highest                           



                          PCT ?| unlikely ? ? ? |                           



                          ? ? ? ? ? ? ? | ? ? ? ?                           



                          ? ? ? ? ? ? ? ? ? ? |                           



                          level ? ? ? ?| ? ? ? ? ?                           



                          ? ? ?| ? ? ? ? ? ? ? | ?                           



                          ? ? ? ? ? ? ? ? ? ? ? ?                           



                          ?                                      



                          +--------------+--------                           



                          --------+---------------                           



                          +-----------------------                           



                          -----+| >=0.5 ? ? ? ?|                           



                          Bacterial ? ? ?|                           



                          Encouraged ? ?| ? ? ? ?                           



                          ? ? ? ? ? ? ? ? ? ? | ?                           



                          ? ? ? ? ? ?| infection ?                           



                          ? ?| ? ? ? ? ? ? ? | ? ?                           



                          ? ? ? ? ? ? ? ? ? ? ? ?                           



                          | ? ? ? ? ? ? ?| likely                           



                          ? ? ? ? | ? ? ? ? ? ? ?                           



                          | Consider treatment                           



                          failure                                



                          ?+--------------+-------                           



                          ---------+--------------                           



                          -+ if levels does not                           



                          decrease | >1.0 ? ? ? ?                           



                          | Bacterial ? ? ?|                           



                          Strongly ? ? ?|                           



                          appropriately ? ? ? ? ?                           



                          ? ? | ? ? ? ? ? ? ?|                           



                          infection very |                           



                          encouraged ? ?| ? ? ? ?                           



                          ? ? ? ? ? ? ? ? ? ? | ?                           



                          ? ? ? ? ? ?| likely ? ?                           



                          ? ? | ? ? ? ? ? ? ? | ?                           



                          ? ? ? ? ? ? ? ? ? ? ? ?                           



                          ?                                      



                          +--------------+--------                           



                          --------+---------------                           



                          +-----------------------                           



                          -----+ Percentage of                           



                          drop of Procalcitonin                           



                          calculation for                           



                          Discontinuation of                           



                          antibiotics in                           



                          high-acuity patients                           



                          with suspected or                           



                          confirmed sepsis in                           



                          Adults >= 18 years of                           



                          age.  ? ? ? ? ? ? ? ? ?                           



                          ? ? Procalcitonin                           



                          highest{}-Procalcitonin                           



                          current{}Delta                           



                          Procalcitonin =                           



                          ________________________                           



                          _______________________                           



                          x100%  ? ? ? ? ? ? ? ? ?                           



                          ? ? ? ? ? ? ?                           



                          Procalcitonin current {}                           



                          IMPORTANT NOTE:                           



                          Procalcitonin may be                           



                          elevated without                           



                          bacterial infection by                           



                          physiologic stress                           



                          related to trauma,                           



                          burns, chronic dialysis,                           



                          metastatic cancer,                           



                          surgery in the past                           



                          seven days, malaria,                           



                          some fungal infections,                           



                          and some forms of                           



                          vasculitis. The                           



                          interpretation algorithm                           



                          may not apply to                           



                          patients with                           



                          immunosuppression                           



                          (equivalent of >10 mg of                           



                          prednisone daily), HIV                           



                          with CD4 cell count <                           



                          350 cells/mm3, active                           



                          malignancy on systemic                           



                          chemotherapy, solid                           



                          organ transplant or                           



                          hematopoietic stem cell                           



                          transplantation, or                           



                          hospital acquired                           



                          pneumonia. Additionally,                           



                          some clinical trials of                           



                          procalcitonin have                           



                          excluded patients with                           



                          shock requiring                           



                          vasopressor use, acute                           



                          respiratory failure                           



                          requiring mechanical                           



                          ventilation, or those                           



                          with known lung                           



                          abscess/empyema. For                           



                          further information                           



                          please refer                           



                          to:[email or web address]                           



                          [email or web address]                           

 

             Lab Interpretation Abnormal                               



             (test code = 99007-4)                                        



[id number]00





             Test Item    Value        Reference Range Interpretation Comments

 

             VIT D 25OH (test code = 33 ng/mL     25-80                     



             56292-8)                                            

 

             MISA (test code = MISA) Deficiency: <20                           



                          ng/mLInsufficiency                           



                          : 20-24                                



                          ng/mLOptimal:                           



                          25-80 ng/mL                            

 

             Lab Interpretation (test Normal                                 



             code = 01981-6)                                        



[facility] 
21:23:00





             Test Item    Value        Reference Range Interpretation Comments

 

             HGB A1C (test code = 5.9 %        4.0-6.0                   



             4548-4)                                             

 

             MISA (test code = MISA) %A1C (NGSP)                            



                          Interpretation                           



                          (ADA)4.8-5.6 ? ?                           



                          Normal or                              



                          (Non-Diabetic                           



                          Range)5.7-6.4 ? ?                           



                          Increased Risk                           



                          (Pre-Diabetic)>6.5 ? ?                           



                          ? ?Diabetes Indicated                           

 

             Lab Interpretation Normal                                 



             (test code = 35309-1)                                        



[facility] [id number] 
20:27:24 No acute pulmonary embolus. Diffuse groundglass/nodular opacities 
predominantly within the periphery ofboth lungs, consistent with known Covid-19 
infection. _______________________________ I, Paul Parker MD., have reviewed 
this study and agree with the abovereport.EXAM: CT CHEST PULMONARY ANGIOGRAM 
CLINICAL INDICATION: PE suspected, high pretest prob COVID pna ?  COMPARISON: 
?None. TECHNIQUE: ?Helical CT was performed and reconstructed at 1.25 mm 
slicethickness from lung bases to apices using 100 mL Omnipaque 
intravenouscontrast, without complication. ? Axial MIPS and coronal/sagittal 
MPRS werereconstructed. ? (DFOV = 30 cm) FINDINGS: PULMONARY ARTERIES: 
Enhancement is excellent. There is no filling defect within the 
pulmonaryarterial system. CHEST: Lower neck/thyroid: Unremarkable. Lungs: Dif
fuse groundglass/nodular opacities predominantly along theperiphery of both 
lungs. Central airway: Unremarkable. Pleura: No pleural effusion, thickening or 
pneumothorax. Thoracic aorta and great vessels: ?Normal in diameter. Heart and 
pericardium: The heart is normal in size. . No pericardialeffusion.Lymph nodes: 
Enlarged left paratracheal node measuring 1.2 cm (4:27). Mediastinum: 
Unremarkable. Bones and soft tissues: No acute osseous abnormality. Soft tissues
areunremarkable. Visualized upper abdomen: Unremarkable. 
_______________________________ [facility], Radiant Results Inft User - 2021  
2:28 PM CSTEXAM: CT CHEST PULMONARY ANGIOGRAMCLINICAL INDICATION: PE suspected, 
high pretest prob COVID pna   COMPARISON:  None.TECHNIQUE:  Helical CT was 
performed and reconstructed at 1.25 mm slicethickness from lung bases to apices 
using 100 mL Omnipaque intravenouscontrast, without complication.   Axial MIPS 
and coronal/sagittal MPRS werereconstructed.   (DFOV = 30 cm)FINDINGS:PULMONARY 
ARTERIES:Enhancement is excellent. There is no filling defect within the 
pulmonaryarterial system.CHEST:Lower neck/thyroid: Unremarkable.Lungs: Diffuse 
groundglass/nodular opacities predominantly along theperiphery of both 
lungs.Central airway: Unremarkable.Pleura: No pleural effusion, thickening or 
pneumothorax.Thoracic aorta and great vessels:  Normal in diameter.Heart and 
pericardium: The heart is normal in size. . No pericardialeffusion.Lymph nodes: 
Enlarged left paratracheal node measuring 1.2 cm (4:27).Mediastinum: 
Unremarkable.Bones and soft tissues: No acute osseous abnormality. Soft tissues 
areunremarkable.Visualized upper abdomen: 
Unremarkable._______________________________IMPRESSIONNo acute pulmonary 
embolus.Diffuse groundglass/nodular opacities predominantly within the periphery
ofboth lungs, consistent with known Covid-19 
infection._______________________________I, Paul Murphy MD., have reviewed 
this study and agree with the abovereport.[facility]
LACTATE [id number] 19:40:00





             Test Item    Value        Reference Range Interpretation Comments

 

             LDH (test code = 9786169252) 706 U/L      300-600      H           

 

 

             Lab Interpretation (test code = Abnormal                           

    



             15267-3)                                            



[facility] [id number] 18:54:00





             Test Item    Value        Reference Range Interpretation Comments

 

             FERRITIN (test code = 428.0 ng/mL  18.0-464.0                



             [id number])                                         

 

             MISA (test code = MISA) Biotin has been                           



                          reported to cause a                           



                          negative bias,                           



                          interpret results                           



                          relative to                            



                          patient's use of                           



                          biotin.                                

 

             Lab Interpretation (test Normal                                 



             code = 67038-4)                                        



[facility] HospitalD[id number] 18:31:00





             Test Item    Value        Reference    Interpretation Comments



                                       Range                     

 

             D-DIMER (test code = <0.27        See_Comment                [Autom

ated



             [id number])                                         message] The



                                                                 system which



                                                                 generated this



                                                                 result



                                                                 transmitted



                                                                 reference range

:



                                                                 <0.41 ?g/mL



                                                                 (FEU). The



                                                                 reference range



                                                                 was not used to



                                                                 interpret this



                                                                 result as



                                                                 normal/abnormal

.

 

             MISA (test code = This test may be                           



             MISA)         used in conjunction                           



                          with a clinical                           



                          pretest probability                           



                          (PTP) assessment                           



                          model to exclude                           



                          venous                                 



                          thromboembolism                           



                          (VTE) in patients                           



                          suspected of deep                           



                          venous thrombosis                           



                          (DVT) and pulmonary                           



                          embolism (PE)  A                           



                          D-Dimer value less                           



                          than 0.50 ?g/ml                           



                          (FEU) has a negative                           



                          predicative value of                           



                          96 to 100% (95%                           



                          CI)and 97 to 100%                           



                          (95% CI) as an aid                           



                          in the diagnosis of                           



                          deep vein thrombosis                           



                          (DVT) and pulmonary                           



                          embolism when there                           



                          is low or moderate                           



                          pretest probability                           



                          of PE or DVT.                           



                          D-Dimer values are                           



                          expressed in initial                           



                          fibrinogen                             



                          equivalent units                           



                          (FEU)" The assay                           



                          results should be                           



                          used with other                           



                          information,                           



                          including the                           



                          clinical context, in                           



                          forming a diagnosis.                           



                                                                 

 

             Lab Interpretation Normal                                 



             (test code =                                        



             32899-6)                                            



[facility] HospitalCREAT[facility] [id number] 18:18:00





             Test Item    Value        Reference Range Interpretation Comments

 

             CK (test code = 7582453866) 61 U/L                           

 

             Lab Interpretation (test code = Normal                             

    



             67194-9)                                            



[facility] HospitalCBC with [id number] 17:21:00





             Test Item    Value        Reference Range Interpretation Comments

 

             WBC (test code =              See_Comment  H             [Automated



             4979-2)                                             message] The



                                                                 system which



                                                                 generated this



                                                                 result transmit

rodrick



                                                                 reference range

:



                                                                 4.20 - 10.70



                                                                 10*3/?L. The



                                                                 reference range



                                                                 was not used to



                                                                 interpret this



                                                                 result as



                                                                 normal/abnormal

.

 

             RBC (test code =              See_Comment  H             [Automated



             460-8)                                              message] The



                                                                 system which



                                                                 generated this



                                                                 result transmit

rodrick



                                                                 reference range

:



                                                                 4.26 - 5.52



                                                                 10*6/?L. The



                                                                 reference range



                                                                 was not used to



                                                                 interpret this



                                                                 result as



                                                                 normal/abnormal

.

 

             HGB (test code = 15.9 g/dL    12.2-16.4                 



             718-7)                                              

 

             HCT (test code = 48.1 %       38.4-49.3                 



             4544-3)                                             

 

             MCV (test code = 86.0 fL      81.7-95.6                 



             787-2)                                              

 

             MCH (test code = 28.4 pg      26.1-32.7                 



             785-6)                                              

 

             MCHC (test code = 33.1 g/dL    31.2-35.0                 



             786-4)                                              

 

             RDW-SD (test code = 43.2 fL      38.5-51.6                 



             99577-1)                                            

 

             RDW-CV (test code = 13.7 %       12.1-15.4                 



             788-0)                                              

 

             PLT (test code =              See_Comment                [Automated



             777-3)                                              message] The



                                                                 system which



                                                                 generated this



                                                                 result transmit

rodrick



                                                                 reference range

:



                                                                 150 - 328 10*3/

?L.



                                                                 The reference



                                                                 range was not u

sed



                                                                 to interpret th

is



                                                                 result as



                                                                 normal/abnormal

.

 

             MPV (test code = 9.9 fL       9.8-13.0                  



             45228-3)                                            

 

             NRBC/100 WBC (test              See_Comment                [Automat

ed



             code = 6629349959)                                        message] 

The



                                                                 system which



                                                                 generated this



                                                                 result transmit

rodrick



                                                                 reference range

:



                                                                 0.0 - 10.0 /100



                                                                 WBCs. The



                                                                 reference range



                                                                 was not used to



                                                                 interpret this



                                                                 result as



                                                                 normal/abnormal

.

 

             NRBC x10^3 (test code <0.01        See_Comment                [Auto

mated



             = 6486526962)                                        message] The



                                                                 system which



                                                                 generated this



                                                                 result transmit

rodrick



                                                                 reference range

:



                                                                 10*3/?L. The



                                                                 reference range



                                                                 was not used to



                                                                 interpret this



                                                                 result as



                                                                 normal/abnormal

.

 

             GRAN MAT (NEUT) % 92.7 %                                 



             (test code = 770-8)                                        

 

             IMM GRAN % (test code 1.10 %                                 



             = 5427653810)                                        

 

             LYMPH % (test code = 1.2 %                                  



             736-9)                                              

 

             MONO % (test code = 4.8 %                                  



             5905-5)                                             

 

             EOS % (test code = 0.0 %                                  



             713-8)                                              

 

             BASO % (test code = 0.2 %                                  



             706-2)                                              

 

             GRAN MAT x10^3(ANC) 21.85 10*3/uL 1.99-6.95    H            



             (test code =                                        



             0611855841)                                         

 

             IMM GRAN x10^3 (test 0.27 10*3/uL 0.00-0.06    H            



             code = 3709869424)                                        

 

             LYMPH x10^3 (test code 0.28 10*3/uL 1.09-3.23    L            



             = 731-0)                                            

 

             MONO x10^3 (test code 1.13 10*3/uL 0.36-1.02    H            



             = 742-7)                                            

 

             EOS x10^3 (test code = <0.03        0.06-0.53    L            



             711-2)                                              

 

             BASO x10^3 (test code 0.05 10*3/uL 0.01-0.09                 



             = 704-7)                                            

 

             Lab Interpretation Abnormal                               



             (test code = 58255-5)                                        



[id number]00 Moderate 
multifocal pneumonia findings, apparently Covid 19.  EXAM: XR CHEST 1 VW 
COMPARISON: 2020 HISTORY: pneumonia  FINDINGS: Lungs: Decreased 
inspiratory effort comparison with the previous study.There are moderate hazy 
opacities in the mid to lower lungs, somewhatsimilar to the remote study, 
however increased. Heart/Mediastinum: The cardiomediastinal silhouette is normal
in sizeaccountingfor technique. Bones: No osseous lesions are detected. The soft
tissues appear normal [facility], Radiant Results Inft User - 2021 10:56 AM 
CSTEXAM: XR CHEST 1 VWCOMPARISON: 2020HISTORY: pneumonia FINDINGS:Lungs: 
Decreased inspiratory effort comparison with the previous study.There are 
moderate hazy opacities in the mid to lower lungs, somewhatsimilar to the remote
study, however increased.Heart/Mediastinum: The cardiomediastinal silhouette is 
normal in sizeaccounting for technique.Bones: No osseous lesions are detected. 
The soft tissues appear normalIMPRESSIONModerate multifocal pneumonia findings, 
apparently Covid 19.[facility] 
(ALB, T.PRO, BILI T, BU/BC, ALT, AST, ALK PHOS)2021 16:54:00





             Test Item    Value        Reference Range Interpretation Comments

 

             TOTAL BILI (test code = 6933826072) 1.2 mg/dL    0.1-1.1      H    

        

 

             BILI UNCON (test code = 4363092479) 0.6 mg/dL    0.1-1.1           

        

 

             BILI CONJ (test code = 9299145926) 0.0 mg/dL    0.0-0.3            

       

 

             T PROTEIN (test code = 9293368586) 7.8 g/dL     6.3-8.2            

       

 

             ALBUMIN (test code = 6172678717) 4.4 g/dL     3.5-5.0              

     

 

             ALK PHOS (test code = 8641681436) 132 U/L             H      

      

 

             ALTv (test code = 1742-6) 98 U/L       5-50         H            

 

             AST(SGOT) (test code = 7722815510) 59 U/L       13-40        H     

       

 

             Lab Interpretation (test code = Abnormal                           

    



             62605-2)                                            



[facility]





             Test Item    Value        Reference Range Interpretation Comments

 

             TROPONIN I (test 0.001 ng/mL  See_Comment                [Automated



             code = 3167373497)                                        message] 

The



                                                                 system which



                                                                 generated this



                                                                 result



                                                                 transmitted



                                                                 reference range

:



                                                                 <=0.034. The



                                                                 reference range



                                                                 was not used to



                                                                 interpret this



                                                                 result as



                                                                 normal/abnormal

.

 

             MISA (test code = Equal or Less than                           



             MISA)         0.034                                  



                          ng/ml---Normal                           



                          ?Note: Cardiac                           



                          troponin begins to                           



                          rise 3-4 hours                           



                          after the onset of                           



                          ischemia. Repeat                           



                          in 4-6 hours if                           



                          the sample was                           



                          drawn within 3-4                           



                          hours of the onset                           



                          of the symptom and                           



                          found normal.                           



                          Between 0.035 and                           



                          0.120 ng/mL---                           



                          Borderline.                            



                          Questionable                           



                          myocardial injury                           



                          or necrosis ?                           



                          ?Note: Serial                           



                          measurement may be                           



                          necessary to                           



                          confirm or exclude                           



                          the diagnosis of                           



                          myocardial injury                           



                          or necrosis;                           



                          Clinical                               



                          correlation                            



                          (symptoms, EKGs,                           



                          imaging studies,                           



                          and others)                            



                          required; Repeat                           



                          in 4-6 hours if                           



                          clinically                             



                          indicated. ? ? ? ?                           



                          Equal or Higher                           



                          than 0.121                             



                          ng/mL---Abnormal.                           



                          Myocardial Injury                           



                          or Necrosis Likely                           



                          ? ? ? ?  Biotin                           



                          has been reported                           



                          to cause a                             



                          negative bias,                           



                          interpret results                           



                          relative to                            



                          patient's use of                           



                          biotin. ? ? ? ? ?                           



                          ? ? ? ? ? ? ? ? ?                           



                          ? ? ? ? ? ? ? ?  ?                           



                          ? ? ? ? ? ? ? ? ?                           



                          ? ? ? ? ? ? ? ? ?                           



                          ? ? ? ? ? ? ? ? ?                           

 

             Lab Interpretation Normal                                 



             (test code =                                        



             92705-2)                                            



[id number] 16:46:00





             Test Item    Value        Reference Range Interpretation Comments

 

             NT-proBNP (test code 91 pg/mL     See_Comment                [Autom

ated



             = 0629217557)                                        message] The



                                                                 system which



                                                                 generated this



                                                                 result



                                                                 transmitted



                                                                 reference range

:



                                                                 <=125. The



                                                                 reference range



                                                                 was not used to



                                                                 interpret this



                                                                 result as



                                                                 normal/abnormal

.

 

             MISA (test code = MISA) Biotin has been                           



                          reported to                            



                          cause a negative                           



                          bias, interpret                           



                          results relative                           



                          to patient's use                           



                          of biotin.                             

 

             Lab Interpretation Normal                                 



             (test code = 51406-4)                                        



[facility]





             Test Item    Value        Reference Range Interpretation Comments

 

             APTT Patient (test              See_Comment                [Automat

ed



             code = 3173-2)                                        message] The



                                                                 system which



                                                                 generated this



                                                                 result



                                                                 transmitted



                                                                 reference range

:



                                                                 23 - 38 Seconds

.



                                                                 The reference



                                                                 range was not



                                                                 used to interpr

et



                                                                 this result as



                                                                 normal/abnormal

.

 

             MISA (test code = MISA) The [facility] patient                           



                          population mean                           



                          normal value for                           



                          aPTT is 30                             



                          seconds.                               

 

             Lab Interpretation Normal                                 



             (test code = 42796-2)                                        



[facility] / [id number] 16:40:00





             Test Item    Value        Reference Range Interpretation Comments

 

             PROTIME PATIENT (test              See_Comment                [Auto

mated message]



             code = 5964-2)                                        The system wh

ich



                                                                 generated this 

result



                                                                 transmitted ref

erence



                                                                 range: 12.0 - 1

4.7



                                                                 Seconds. The re

ference



                                                                 range was not u

sed to



                                                                 interpret this 

result



                                                                 as normal/abnor

mal.

 

             INR (test code = 6301-6)                                        Nor

mal INR <1.1;



                                                                 Warfarin Therap

eutic



                                                                 range 2.0 to 3.

0 or



                                                                 2.5 to 3.5, dep

ending



                                                                 upon the indica

tions.

 

             Lab Interpretation (test Normal                                 



             code = 16230-0)                                        



[facility] (NA, K, CL, CO2, 
GLUCOSE, BUN, CREATININE, CA)2021 16:37:00





             Test Item    Value        Reference Range Interpretation Comments

 

             NA (test code = 136 mmol/L   135-145                   



             7992940620)                                         

 

             K (test code = 4.0 mmol/L   3.5-5.0                   



             1985289697)                                         

 

             CL (test code = 99 mmol/L                        



             7026268102)                                         

 

             CO2 TOTAL (test code = 25 mmol/L    23-31                     



             7784151848)                                         

 

             AGAP (test code =              2-16                      



             1794262948)                                         

 

             BUN (test code = 22 mg/dL     7-23                      



             5897746265)                                         

 

             GLUCOSE (test code = 155 mg/dL           H            



             0756608165)                                         

 

             CREATININE (test code = 0.82 mg/dL   0.60-1.25                 



             0243106100)                                         

 

             CALCIUM (test code = 9.0 mg/dL    8.6-10.6                  



             8353435761)                                         

 

             eGFR Calculation              mL/min/1.73m2              



             (Non-)                                        



             (test code =                                        



             4876888837)                                         

 

             eGFR Calculation              mL/min/1.73m2              



             (African American)                                        



             (test code =                                        



             6023088956)                                         

 

             MISA (test code = MISA) Association of                           



                          Glomerular Filtration                           



                          Rate (GFR) and Staging                           



                          of Kidney Disease*                           



                          +---------------------                           



                          --+-------------------                           



                          --+-------------------                           



                          ------+| GFR                           



                          (mL/min/1.73 m2) ?|                           



                          With Kidney Damage ?|                           



                          ?Without Kidney                           



                          Damage+---------------                           



                          --------+-------------                           



                          --------+-------------                           



                          ------------+| ?>90 ?                           



                          ? ? ? ? ? ? ? ?|                           



                          ?Stage one ? ? ? ? ?|                           



                          ? Normal ? ? ? ? ? ? ?                           



                          ?+--------------------                           



                          ---+------------------                           



                          ---+------------------                           



                          -------+| ?60-89 ? ? ?                           



                          ? ? ? ? ?| ?Stage two                           



                          ? ? ? ? ?| ? Decreased                           



                          GFR ? ? ? ?                            



                          +---------------------                           



                          --+-------------------                           



                          --+-------------------                           



                          ------+| ?30-59 ? ? ?                           



                          ? ? ? ? ?| ?Stage                           



                          three ? ? ? ?| ? Stage                           



                          three ? ? ? ? ?                           



                          +---------------------                           



                          --+-------------------                           



                          --+-------------------                           



                          ------+| ?15-29 ? ? ?                           



                          ? ? ? ? ?| ?Stage four                           



                          ? ? ? ? | ? Stage four                           



                          ? ? ? ? ?                              



                          ?+--------------------                           



                          ---+------------------                           



                          ---+------------------                           



                          -------+| ?<15 (or                           



                          dialysis) ? ?| ?Stage                           



                          five ? ? ? ? | ? Stage                           



                          five ? ? ? ? ?                           



                          ?+--------------------                           



                          ---+------------------                           



                          ---+------------------                           



                          -------+ *Each stage                           



                          assumes the associated                           



                          GFR level has been in                           



                          effect for at least                           



                          three months. ?Stages                           



                          1 to 5, with or                           



                          without kidney                           



                          disease, indicate                           



                          chronic kidney                           



                          disease. Notes:                           



                          Determination of                           



                          stages one and two                           



                          (with eGFR                             



                          >59mL/min/1.73 m2)                           



                          requires estimation of                           



                          kidney damage for at                           



                          least three months as                           



                          defined by structural                           



                          or functional                           



                          abnormalities of the                           



                          kidney, manifested by                           



                          either:Pathological                           



                          abnormalities or                           



                          Markers of kidney                           



                          damage (including                           



                          abnormalities in the                           



                          composition of the                           



                          blood or urine or                           



                          abnormalities in                           



                          imaging tests).                           

 

             Lab Interpretation Abnormal                               



             (test code = 03389-8)                                        



[facility] 
16:37:00





             Test Item    Value        Reference Range Interpretation Comments

 

             SARS-CoV-2 Rapid ID NOW Positive     Not Detected A            



             (test code = 99985-4)                                        

 

             MISA (test code = MISA) ID NOW COVID-19 Assay                        

   



                          is an isothermal                           



                          nucleic acid                           



                          amplification test                           



                          intended for the                           



                          qualitative detection                           



                          of nucleic acid from                           



                          SARS-CoV-2 viral RNA                           



                          in nasopharyngeal (NP)                           



                          specimens. It is used                           



                          under Emergency Use                           



                          Authorization (EUA) by                           



                          FDA. The limit of                           



                          detection (LOD) of the                           



                          assay is 125 Genome                           



                          Equivalents/mL. A                           



                          positive result is                           



                          indicative of the                           



                          presence of SARS-CoV-2                           



                          RNA. ?Clinical                           



                          correlation with                           



                          patient history and                           



                          other diagnostic                           



                          information is                           



                          necessary to determine                           



                          patient infection                           



                          status. A negative                           



                          (Not Detected) result                           



                          does not preclude                           



                          SARS-CoV-2 infection.                           



                          In patients with                           



                          clinical symptoms and                           



                          other tests that are                           



                          consistent with                           



                          SARS-CoV-2 infection,                           



                          negative results                           



                          should be treated as                           



                          presumptive negative                           



                          and a new specimen                           



                          should be tested with                           



                          alternative PCR                           



                          molecular test.                           



                          Invalid: Please                           



                          collect a new specimen                           



                          for repeat patient                           



                          testing if clinically                           



                          indicated.                             

 

             Lab Interpretation Abnormal                               



             (test code = 74022-0)                                        



[facility]

## 2022-01-22 NOTE — EDPHYS
Physician Documentation                                                                           

 [facility]                                                                 

Name: Ralph Yañez                                                                             

Age: 41 yrs                                                                                       

Sex: Male                                                                                         

: 1980                                                                                   

MRN: [id number]                                                                                   

Arrival Date: 2022                                                                          

Time: 01:28                                                                                       

Account#: [id number]                                                                            

Bed 10                                                                                            

Private MD:                                                                                       

ED Physician Wally Kapadia                                                                      

HPI:                                                                                              

                                                                                             

02:18 This 41 yrs old Male presents to ER via Ambulatory with complaints of Breathing         mh7 

      Difficulty.                                                                                 

02:18 The patient presents to the emergency department with wheezing, Current therapy:        mh7 

      albuterol nebs, that began after exposure to animal dander, after exposure to pollen,       

      the patient was reported to have audible wheezing, non-productive cough, trouble            

      breathing, Pre-hospital care: med neb, Xopenex.                                             

02:18 Onset: The symptoms/episode began/occurred 1 week(s) ago. Modifying factors: The        mh7 

      symptoms are alleviated by nebulizer treatment, the symptoms are aggravated by animal       

      dander, cold weather, pollen. Associated signs and symptoms: Pertinent negatives: chest     

      pain, choking, fever, headache, nausea, palpitations, rash, vomiting. Severity of           

      symptoms: At their worst the symptoms were moderate 3 day(s) ago, in the emergency          

      department the symptoms have improved moderately. The patient has experienced similar       

      episodes in the past, chronically. States that he has been having an asthma                 

      exacerbation for the past week with noted wheezing and coughing..                           

                                                                                                  

Historical:                                                                                       

- Allergies:                                                                                      

01:35 Dilantin;                                                                               as6 

- Home Meds:                                                                                      

01:35 albuterol sulfate 0.63 mg/3 mL Inhl nebu [Active]; Flonase 50 mcg/actuation Nasal spsn  as6 

      1 spray 2 times per day [Active]; levothyroxine oral [Active]; Zyrtec 10 mg Oral chew 1     

      tab once daily [Active];                                                                    

- PMHx:                                                                                           

01:35 Asthma; hyperthyroidism; Pneumonia; seasonal allergies;                                 as6 

- PSHx:                                                                                           

01:35 Thyroidectomy;                                                                          as6 

                                                                                                  

- Immunization history:: Client reports having NOT received the Covid vaccine.                    

- Social history:: Smoking status: Patient denies any tobacco usage or history of.                

                                                                                                  

                                                                                                  

ROS:                                                                                              

02:18 Constitutional: Negative for fever, chills, and weight loss, Eyes: Negative for injury, mh7 

      pain, redness, and discharge, ENT: Negative for injury, pain, and discharge, Neck:          

      Negative for injury, pain, and swelling, Cardiovascular: Negative for chest pain,           

      palpitations, and edema, Abdomen/GI: Negative for abdominal pain, nausea, vomiting,         

      diarrhea, and constipation, Back: Negative for injury and pain, : Negative for            

      injury, bleeding, discharge, and swelling, MS/Extremity: Negative for injury and            

      deformity, Skin: Negative for injury, rash, and discoloration, Neuro: Negative for          

      headache, weakness, numbness, tingling, and seizure, Psych: Negative for depression,        

      anxiety, suicide ideation, homicidal ideation, and hallucinations, Allergy/Immunology:      

      Negative for hives, rash, and allergies, Endocrine: Negative for neck swelling,             

      polydipsia, polyuria, polyphagia, and marked weight changes, Hematologic/Lymphatic:         

      Negative for swollen nodes, abnormal bleeding, and unusual bruising.                        

                                                                                                  

Exam:                                                                                             

02:18 Constitutional:  This is a well developed, well nourished patient who is awake, alert,  mh7 

      and in no acute distress. Head/Face:  Normocephalic, atraumatic. Eyes:  Pupils equal        

      round and reactive to light, extra-ocular motions intact.  Lids and lashes normal.          

      Conjunctiva and sclera are non-icteric and not injected.  Cornea within normal limits.      

      Periorbital areas with no swelling, redness, or edema. Neck:  Trachea midline, no           

      thyromegaly or masses palpated, and no cervical lymphadenopathy.  Supple, full range of     

      motion without nuchal rigidity, or vertebral point tenderness.  No Meningismus.             

      Chest/axilla:  Normal chest wall appearance and motion.  Nontender with no deformity.       

      No lesions are appreciated. Cardiovascular:  Regular rate and rhythm with a normal S1       

      and S2.  No gallops, murmurs, or rubs.  Normal PMI, no JVD.  No pulse deficits.             

02:18 Abdomen/GI:  Soft, non-tender, with normal bowel sounds.  No distension or tympany.  No     

      guarding or rebound.  No evidence of tenderness throughout. Back:  No spinal                

      tenderness.  No costovertebral tenderness.  Full range of motion. Skin:  Warm, dry with     

      normal turgor.  Normal color with no rashes, no lesions, and no evidence of cellulitis.     

      MS/ Extremity:  Pulses equal, no cyanosis.  Neurovascular intact.  Full, normal range       

      of motion. Neuro:  Awake and alert, GCS 15, oriented to person, place, time, and            

      situation.  Cranial nerves II-XII grossly intact.  Motor strength 5/5 in all                

      extremities.  Sensory grossly intact.  Cerebellar exam normal.  Normal gait. Psych:         

      Awake, alert, with orientation to person, place and time.  Behavior, mood, and affect       

      are within normal limits.                                                                   

02:18 Respiratory: the patient does not display signs of respiratory distress,  Respirations:     

      prolonged exhalation, that is moderate, Breath sounds: wheezing: expiratory that is         

      moderate, is heard diffusely, Respiratory rate:  22                                         

                                                                                                  

Vital Signs:                                                                                      

01:33  / 90; Pulse 76; Resp 22 S; Temp 97.0(TE); Pulse Ox 98% on R/A; Weight 88.45 kg   as6 

      (R); Height 5 ft. 9 in. (175.26 cm) (R); Pain 0/10;                                         

04:17  / 86; Pulse 80; Resp 18; Pulse Ox 98% on R/A;                                    as6 

01:33 Body Mass Index 28.80 (88.45 kg, 175.26 cm)                                             as6 

                                                                                                  

MDM:                                                                                              

04:15 Differential diagnosis: acute asthma, exercise-induced asthma, reactive airway, URI.    [facility] 

      Data reviewed: vital signs, nurses notes, old medical records, lab test result(s), Flu:     

      negative COVID-negative. Data interpreted: Pulse oximetry: on room air is 100 %.            

      Interpretation: normal. Counseling: I had a detailed discussion with the patient and/or     

      guardian regarding: the historical points, exam findings, and any diagnostic results        

      supporting the discharge/admit diagnosis, the presence of at least one elevated blood       

      pressure reading (>120/80) during this emergency department visit, lab results, the         

      need for outpatient follow up, to return to the emergency department if symptoms worsen     

      or persist or if there are any questions or concerns that arise at home. Response to        

      treatment: the patient's symptoms have resolved after treatment, the patient's blood        

      pressure is in an acceptable range, mental status has returned to baseline, the patient     

      no longer shows bradycardia, the patient is not short of breath, the patient is not         

      tachycardic, the patient's pain is gone, the patient's temperature has normalized.          

04:16 Patient medically screened.                                                             [facility] 

                                                                                                  

                                                                                             

02:18 Order name: COVID-19/FLU A+B (Document "Date of Onset" if Symptomatic)                  [facility] 

                                                                                             

02:18 Order name: COVID-19/FLU A+B; Complete Time: 03:28                                      [facility]

                                                                                                  

Administered Medications:                                                                         

02:40 Drug: Albuterol - atroVENT (ipratropium) (3:1) (2.5 mg - 0.5 mg) 3 ml Route: Nebulizer; as6 

04:17 Follow up: Response: No adverse reaction; Wheezing diminished                           as6 

02:40 Drug: predniSONE 60 mg Route: PO;                                                       as6 

04:17 Follow up: Response: No adverse reaction                                                as6 

                                                                                                  

                                                                                                  

Disposition Summary:                                                                              

22 04:16                                                                                    

Discharge Ordered                                                                                 

      Location: Home                                                                          [facility] 

      Problem: an acute exacerbation                                                          [facility] 

      Symptoms: have improved                                                                 [facility] 

      Condition: Stable                                                                       [facility] 

      Diagnosis                                                                                   

        - Unspecified asthma with (acute) exacerbation                                        [facility] 

      Followup:                                                                               [facility] 

        - With: Private Physician                                                                  

        - When: 1 - 2 days                                                                         

        - Reason: Worsening of condition, Recheck today's complaints, Continuance of care,         

      Re-evaluation by your physician                                                             

      Discharge Instructions:                                                                     

        - Discharge Summary Sheet                                                             [facility] 

        - Asthma, Adult                                                                       [facility] 

      Forms:                                                                                      

        - Medication Reconciliation Form                                                      [facility] 

        - Thank You Letter                                                                    [facility] 

        - Antibiotic Education                                                                [facility] 

        - Prescription Opioid Use                                                             [facility] 

      Prescriptions:                                                                              

        - Xopenex 0.63 mg/3 mL Inhalation Solution for Nebulization                                

            - inhale 1 unit by NEBULIZATION route every 8 hours As needed; 1 box; Refills: 0, [facility] 

      Product Selection Permitted                                                                 

        - Prednisone 20 mg Oral Tablet                                                             

            - take 2 tablets by ORAL route once daily for 5 days; 10 tablet; Refills: 0,      [facility] 

      Product Selection Permitted                                                                 

Signatures:                                                                                       

Dispatcher [employer]                           Wally Davis MD MD   [facility]                                                  

Clive Roy RN                      RN   as6                                                  

                                                                                                  

**************************************************************************************************

## 2022-04-18 ENCOUNTER — HOSPITAL ENCOUNTER (EMERGENCY)
Dept: HOSPITAL 97 - ER | Age: 42
Discharge: HOME | End: 2022-04-18
Payer: COMMERCIAL

## 2022-04-18 VITALS — DIASTOLIC BLOOD PRESSURE: 96 MMHG | SYSTOLIC BLOOD PRESSURE: 139 MMHG | OXYGEN SATURATION: 97 %

## 2022-04-18 VITALS — TEMPERATURE: 97.5 F

## 2022-04-18 DIAGNOSIS — J45.901: Primary | ICD-10-CM

## 2022-04-18 DIAGNOSIS — Z88.8: ICD-10-CM

## 2022-04-18 PROCEDURE — 96374 THER/PROPH/DIAG INJ IV PUSH: CPT

## 2022-04-18 PROCEDURE — 99284 EMERGENCY DEPT VISIT MOD MDM: CPT

## 2022-04-18 PROCEDURE — 71045 X-RAY EXAM CHEST 1 VIEW: CPT

## 2022-04-18 PROCEDURE — 96375 TX/PRO/DX INJ NEW DRUG ADDON: CPT

## 2022-04-18 NOTE — XMS REPORT
Continuity of Care Document

                            Created on:2022



Patient:JERMAINE ZIEGLER

Sex:Male

:1980

External Reference #:[id number]





Demographics







                          Address                   [address]



                                                    [place] [address]

 

                          Home Phone                [phone]

 

                          Work Phone                [phone]

 

                          Mobile Phone              [phone]

 

                          Email Address             [email or web address]

 

                          Preferred Language        English

 

                          Marital Status            Unknown

 

                          Synagogue Affiliation     Unknown

 

                          Race                      Unknown

 

                          Additional Race(s)        White

 

                          Ethnic Group              Unknown









Author







                          Organization              [facility]

t

 

                          Address                   [address]



                                                    [place] [address]

 

                          Phone                     [phone]









Support







                Name            Relationship    Address         Phone

 

                Otilio        Spouse          [address]      [phone]



                                                [place] [address] 

 

                Renaldo Ziegler Spouse          [address]  [phone]



                                                [place] [address] 

 

                RENALDO ZIEGLER X               [address]  Unavailable



                                                [place] [address] 









Care Team Providers







                    Name                Role                Phone

 

                    Kamari               Primary Care Physician [phone]

 

                    CRISPIN LOPEZ          Attending Clinician Unavailable

 

                    John CHAVEZ  S       Attending Clinician [phone]

 

                    PASCALE             Attending Clinician Unavailable

 

                    Pascale MALIK         Attending Clinician [phone]

 

                    Doctor Unassigned,  Name Attending Clinician Unavailable

 

                    Beni Smith DO   Attending Clinician [phone]

 

                    Dayron RN  E        Attending Clinician [phone]

 

                    Steve               Attending Clinician [phone]

 

                    Migue CHAVEZ           Attending Clinician [phone]

 

                    Chanel CHAVEZ         Attending Clinician [phone]

 

                    RADIOLOGY           Attending Clinician Unavailable

 

                    Jacky CHAVEZ              Attending Clinician [phone]

 

                    PASCALE             Admitting Clinician Unavailable

 

                    Chanel CHAVEZ         Admitting Clinician [phone]









Payers







           Payer Name Policy Type Policy Number Effective Date Expiration Date S

ource

 

           HIM AMBSAMIRR FROM            [id number] 2021            



           [facility]                       00:00:00              







Problems







       Condition Condition Condition Status Onset  Resolution Last   Treating Co

mments 

Source



       Name   Details Category        Date   Date   Treatment Clinician        



                                                 Date                 

 

       Lower  Lower  Disease Active                              [facility]



       respirator respirator               3-01                               it

y of



       y tract y tract               00:00:                             [place]



       infection infection               00                                 Medi

yeni



       due to due to                                                  Branch



       COVID-19 COVID-19                                                  



       virus  virus                                                   

 

       Postsurgic Postsurgic Disease Active                              U

justin



       al     al                   5-02                               ity of



       hypothyroi hypothyroi               00:00:                             [place]



       dism   dism                 00                                 Medical



                                                                      Branch

 

       S/P    S/P    Disease Active                              [facility]



       thyroidect thyroidect               4-21                               it

y of



       ramonita    ramonita                  00:00:                             [place]



                                                                    Medical



                                                                      Branch

 

       Hyperthyro Hyperthyro Disease Active 2016                             U

garoers



       idism  idism                1-29                               ity of



                                   00:00:                             [place]



                                   00                                 Medical



                                                                      Branch







Allergies, Adverse Reactions, Alerts







       Allergy Allergy Status Severity Reaction(s) Onset  Inactive Treating Comm

ents 

Source



       Name   Type                        Date   Date   Clinician        

 

       PHENYTOI DRUG   Active        Hives                        [facility]



       N SODIUM INGREDI                      7-12                        ity of



       EXTENDED                             00:00:                      [place]



                                                                    Medical



                                                                      Branch

 

       Phenytoi Propensi Active        Hives                        Univer

s



       n Sodium ty to                       7-12                        ity of



       Extended adverse                      00:00:                      [place]



              reaction                      00                          Medical



              s                                                       Branch







Social History







           Social Habit Start Date Stop Date  Quantity   Comments   Source

 

           Exposure to                       Not sure              [facility]

 [place]



           SARS-CoV-2 (event)                                             [facility]

l Branch

 

           Alcohol intake 2022 0 /d                  [facility]

 [facility] [place]



                      00:00:00   00:00:00                         [facility]

 

           Tobacco use and 2016 Never used            [facility]



           exposure   00:00:00   00:00:00                         [facility]

 

           Sex Assigned At 1980                       [facility]



           Birth      00:00:00   00:00:00                         [facility]









                Smoking Status  Start Date      Stop Date       Source

 

                Never smoker                                    [facility]







Medications







       Ordered Filled Start  Stop   Current Ordering Indication Dosage Frequency

 Signature

                    Comments            Components          Source



     Medication Medication Date Date Medication? Clinician                (SIG) 

          



     Name Name                                                   

 

     ipratropium            Yes            3mL       3 mL,           Unive

rs



     -albuteroL      2-20                               Inhalation           ity

 of



     (DUONEB)      14:00:                               , QID,           [place]



     0.5 mg-3      00                                 First dose           Medic

al



     mg(2.5 mg                                         on Sun           Branch



     base)/3 mL                                         22 at           



     nebulizer                                         0800,           



     solution 3                                         Until           



     mL                                           Discontinu           



                                                  ed,            



                                                  Routine           

 

     ipratropium      2022- No             3mL       3 mL,           Univ

ers



     -albuteroL      2-20 -20                          Inhalation           it

y of



     (DUONEB)      14:00: 11:06                          , QID,           [place]



     0.5 mg-3      00   :35                           First dose           Medic

al



     mg(2.5 mg                                         on Sun           Branch



     base)/3 mL                                         22 at           



     nebulizer                                         0800,           



     solution 3                                         Until           



     mL                                           Discontinu           



                                                  ed,            



                                                  Routine           

 

     methylPREDN      2022- No             40mg      40 mg,           Uni

vers



     ISolone sod                                Intravenou           i

ty of



     succ      10:45: 09:46                          s, ONCE, 1           [place]



     (SOLU-MEDRO      00   :00                           dose, On           Medi

yeni



     L (PF))                                         Sun            Branch



     injection                                         22 at           



     40 mg                                         0445, STAT           

 

     predniSONE      0      Yes       [id number]           Take one         

  [facility]



     20 mg      2-20                               tablet by           ity of



     tablet      00:00:                               mouth           [place]



               00                                 daily           Medical



                                                                 Branch

 

     dexamethaso      2021- No        [id number] 10mg      10 mg,         

  [facility]



     ne        --19                          Intramuscu           ity of



     (DECADRON      01:30: 01:30                          lar, ONCE,           T

exas



     PHOSPHATE)      00   :00                           1 dose, On           Med

ical



     injection                                         Sat            Branch



     10 mg                                         21           



                                                  at 1930,           



                                                  STAT           

 

     predniSONE      2021- No        [id number] 40mg      Take 2          

 [facility]



     20 mg      2-18 12-24                          tablets by           ity of



     tablet      00:00: 05:59                          mouth           [place]



               00   :00                           daily for           Medical



                                                  5 days.           Branch

 

     methylPREDN            Yes       [id number]           Take  by         

  [facility]



     ISolone 4      3-09                               mouth           ity of



     mg tablets      00:00:                               SEE-INSTRU           T

exas



               00                                 CTIONS.           Medical



                                                  follow           Branch



                                                  package           



                                                  directions           

 

     methylPREDN      -0      Yes       [id number]           Take  by         

  [facility]



     ISolone 4      3-09                               mouth           ity of



     mg tablets      00:00:                               SEE-INSTRU           T

exas



               00                                 CTIONS.           Medical



                                                  follow           Branch



                                                  package           



                                                  directions           

 

     methylPREDN      -0      Yes       [id number]           Take  by         

  [facility]



     ISolone 4      3-09                               mouth           ity of



     mg tablets      00:00:                               SEE-INSTRU           T

exas



               00                                 CTIONS.           Medical



                                                  follow           Branch



                                                  package           



                                                  directions           

 

     methylPREDN      -      Yes       [id number]           Take  by         

  [facility]



     ISolone 4      3-09                               mouth           ity of



     mg tablets      00:00:                               SEE-INSTRU           T

exas



               00                                 CTIONS.           Medical



                                                  follow           Branch



                                                  package           



                                                  directions           

 

     methylPREDN      -0      Yes       [id number]           Take  by         

  [facility]



     ISolone 4      3-09                               mouth           ity of



     mg tablets      00:00:                               SEE-INSTRU           T

exas



               00                                 CTIONS.           Medical



                                                  follow           Branch



                                                  package           



                                                  directions           

 

     methylPREDN      -0      Yes       [id number]           Take  by         

  [facility]



     ISolone 4      3-09                               mouth           ity of



     mg tablets      00:00:                               SEE-INSTRU           T

exas



               00                                 CTIONS.           Medical



                                                  follow           Branch



                                                  package           



                                                  directions           

 

     methylPREDN      -0      Yes       [id number]           Take  by         

  [facility]



     ISolone 4      3-09                               mouth           ity of



     mg tablets      00:00:                               SEE-INSTRU           T

exas



               00                                 CTIONS.           Medical



                                                  follow           Branch



                                                  package           



                                                  directions           

 

     budesonide-            Yes            2{puff}      Inhale 2          

 [facility]



     formoterol      3-08                               Puffs 2           ity of



     (SYMBICORT)      22:37:                               (two)           [place]



     160-4.5      15                                 times           Medical



     mcg/actuati                                         daily.           Branch



     on inhaler                                                        

 

     budesonide-            Yes            2{puff}      Inhale 2          

 [facility]



     formoterol      3-08                               Puffs 2           ity of



     (SYMBICORT)      22:37:                               (two)           [place]



     160-4.5      15                                 times           Medical



     mcg/actuati                                         daily.           Branch



     on inhaler                                                        

 

     budesonide-            Yes            2{puff}      Inhale 2          

 [facility]



     formoterol      3-08                               Puffs 2           ity of



     (SYMBICORT)      22:37:                               (two)           [place]



     160-4.5      15                                 times           Medical



     mcg/actuati                                         daily.           Branch



     on inhaler                                                        

 

     budesonide-            Yes            2{puff}      Inhale 2          

 [facility]



     formoterol      3-08                               Puffs 2           ity of



     (SYMBICORT)      22:37:                               (two)           [place]



     160-4.5      15                                 times           Medical



     mcg/actuati                                         daily.           Branch



     on inhaler                                                        

 

     budesonide-            Yes            2{puff}      Inhale 2          

 [facility]



     formoterol      3-08                               Puffs 2           ity of



     (SYMBICORT)      16:37:                               (two)           [place]



     160-4.5      15                                 times           Medical



     mcg/actuati                                         daily.           Branch



     on inhaler                                                        

 

     budesonide-            Yes            2{puff}      Inhale 2          

 [facility]



     formoterol      3-08                               Puffs 2           ity of



     (SYMBICORT)      16:37:                               (two)           [place]



     160-4.5      15                                 times           Medical



     mcg/actuati                                         daily.           Branch



     on inhaler                                                        

 

     budesonide-            Yes            2{puff}      Inhale 2          

 [facility]



     formoterol      3-08                               Puffs 2           ity of



     (SYMBICORT)      16:37:                               (two)           [place]



     160-4.5      15                                 times           Medical



     mcg/actuati                                         daily.           Branch



     on inhaler                                                        

 

     dexamethaso            Yes            6mg       6 mg, IV           Un

ethan



     ne        3-06                               Piggyback,           ity of



     (DECADRON      15:00:                               DAILY,           [place]



     PHOSPHATE)      00                                 First dose           Med

ical



     6 mg in                                         (after           Branch



     NaCl 0.9%                                         last           



     (NS) 50 mL                                         modificati           



     piggyback                                         on) on Sat           



                                                  3/6/21 at           



                                                  0900,           



                                                  Until           



                                                  Discontinu           



                                                  ed, 50 mL           

 

     sodium            Yes            1{spray      1 Spray,           Univ

ers



     chloride      305                     }         Nasal,           ity of



     (OCEAN MIST      17:30:                               PRN,           [place]



     NASAL) 0.65      50                                 Starting           Medi

yeni



     % nasal                                         Fri 3/5/21           Branch



     spray 1                                         at 1130,           



     Spray                                         Until           



                                                  Discontinu           



                                                  ed,            



                                                  Routine,           



                                                  Surgery/Pr           



                                                  ocedure,           



                                                  Fro            



                                                  irritation           



                                                  from High           



                                                  Flow Nasal           



                                                  Cannula           

 

     azithromyci       No             250mg      250 mg,           U

nivers



     n         3-04 03-06                          Oral,           ity of



     (ZITHROMAX)      15:00: 14:00                          DAILY, 3           T

exas



     tablet 250      00   :00                           doses,           Medical



     mg                                           First dose           Branch



                                                  on Thu           



                                                  3/4/21 at           



                                                  0900, Last           



                                                  dose on           



                                                  Sat 3/6/21           



                                                  at 0900,           



                                                  ASAP<br>Re           



                                                  ason for           



                                                  Anti-Infec           



                                                  tive:           



                                                  Empiric           



                                                  Therapy           



                                                  for            



                                                  Suspected           



                                                  Infection<           



                                                  br>Empiric           



                                                  Therapy           



                                                  Site:           



                                                  Respirator           



                                                  y<br>Durat           



                                                  ion of           



                                                  therapy:           



                                                  72 hours           

 

     sennosides            Yes            8.6mg      8.6 mg,           Uni

vers



     (SENOKOT)      3-03                               Oral, BID,           ity 

of



     tablet 8.6      17:30:                               First dose           T

exas



     mg        00                                 on Wed           Medical



                                                  3/3/21 at           Branch



                                                  1130,           



                                                  Until           



                                                  Discontinu           



                                                  ed,            



                                                  Routine           

 

     Polyethylen            Yes            17g       17 g,           Unive

rs



     e Glycol      3-03                               Oral, BID,           ity o

f



     3350      17:30:                               First dose           [place]



     (MIRALAX)      00                                 on Wed           Medical



     powder 17 g                                         3/3/21 at           [facility]

[facility]



                                                  1130,           



                                                  Until           



                                                  Discontinu           



                                                  ed,            



                                                  Routine           

 

     bisacodyL      0      Yes            10mg      10 mg,           Univer

s



     (DULCOLAX)      3                               Rectal,           ity of



     suppository      17:20:                               QDAILYPRN,           

[place]



     10 mg      14                                 Starting           Medical



                                                  Wed 3/3/21           [facility]



                                                  at 1120,           



                                                  Until           



                                                  Discontinu           



                                                  ed,            



                                                  Routine,           



                                                  Constipati           



                                                  on             



                                                  unresolved           



                                                  by oral           



                                                  medication           



                                                  s              

 

     codeine-gua      0      Yes            10mL      10 mL,           Univ

ers



     ifenesin      3-03                               Oral,           ity of



     (ROBITUSSIN      14:50:                               Q4HPRN,           [facility]

as



     AC)       40                                 Starting           Medic

al



     mg/5 mL                                         Wed 3/3/21           [facility]



     solution 10                                         at 0850,           



     mL                                           Until           



                                                  Discontinu           



                                                  ed,            



                                                  Routine,           



                                                  Cough           

 

     ergocalcife            Yes            43252N      50,000           Un

ethan



     rol       3-02                               Units,           ity of



     (vitamin      15:00:                               Oral,           [place]



     d2)       00                                 QWEEKLY,           Medical



     (CALCIFEROL                                         First dose           Br

anch



     ) capsule                                         on Tue           



     50,000                                         3/2/21 at           



     Units                                         0900,           



                                                  Until           



                                                  Discontinu           



                                                  ed,            



                                                  Routine           

 

     levothyroxi            Yes            137ug      137 mcg,           U

nivers



     ne        3-02                               Oral,           ity of



     (SYNTHROID)      12:00:                               QAM-0600,           T

[facility]



     tablet 137      00                                 First dose           Med

ical



     mcg                                          on Tue           [facility]



                                                  3/2/21 at           



                                                  0600,           



                                                  Until           



                                                  Discontinu           



                                                  ed             

 

     ascorbic      0      Yes            500mg      500 mg,           Unive

rs



     acid      3                               Oral, BID,           ity of



     (vitamin C)      02:00:                               First dose           

[place]



     (VITAMIN C)      00                                 on Mon           Medica

l



     tablet 500                                         3/1/21 at           [facility]

ch



     mg                                           ,           



                                                  Until           



                                                  Discontinu           



                                                  ed,            



                                                  Routine           

 

     budesonide-      -0      Yes            2{puff}      2 Puff,           

[facility]



     formoteroL      3-02                               Inhalation           ity

 of



     (SYMBICORT)      02:00:                               , BID,           [place]

s



     160-4.5      00                                 First dose           Medica

l



     mcg/actuati                                         on 



     on inhaler                                         3/1/21 at           



     2 Puff                                         ,           



                                                  Until           



                                                  Discontinu           



                                                  ed,            



                                                  Routine           

 

     enoxaparin      0      Yes            40mg      40 mg,           Unive

rs



     (LOVENOX)      3                               Subcutaneo           ity 

of



     injection      23:00:                               us, DAILY,           [facility]

[facility]



     40 mg      00                                 First dose           Medical



                                                  on Mon           [facility]



                                                  3/1/21 at           



                                                  1700,           



                                                  Until           



                                                  Discontinu           



                                                  ed,            



                                                  Routine           

 

     ondansetron            Yes            4mg       4 mg, Slow           

[facility]



     (ZOFRAN      3-01                               IV Push,           ity of



     (PF))      19:01:                               Q6HPRN,           [place]



     injection 4      50                                 Starting           Medi

yeni



     mg                                           Mon 3/1/21           [facility]



                                                  at 1301,           



                                                  Until           



                                                  Discontinu           



                                                  ed,            



                                                  Routine,           



                                                  Nausea and           



                                                  Vomiting           



                                                  (N/V)           

 

     cefTRIAXone            Yes            1000mg      1,000 mg,          

 [facility]



     (ROCEPHIN)      3-01                               IV             ity of



     1,000 mg in      18:45:                               Piggyback,           

[place]



     NaCl 0.9%      00                                 Q24H ABX,           Medic

al



     (NS) 50 mL                                         First dose           [facility]

[facility]



     MINI-BAG                                         on Mon           



                                                  3/1/21 at           



                                                  1245,           



                                                  Until           



                                                  Discontinu           



                                                  ed, 50           



                                                  mL<br&gt;R           



                                                  elizabeth for           



                                                  Anti-Infec           



                                                  tive:           



                                                  [facility]           



                                                  Therapy           



                                                  for            



                                                  Suspected           



                                                  Infection<           



                                                  br>[facility]           



                                                  Therapy           



                                                  Site:           



                                                  Respirator           



                                                  y<br>Durat           



                                                  ion of           



                                                  therapy:           



                                                  72 hours           

 

     iohexol      2021- No             100mL      100 mL,           Unive

rs



     (OMNIPAQUE      3-01 03-01                          Intravenou           it

y of



     350       18:05: 18:05                          s, ONCE, 1           [place]



     BULK-100      00   :00                           dose, Mon           Medica

l



     mL)                                          3/1/21 at           [facility]



     injection                                         1230,           



     100 mL                                         Routine           

 

     zinc            Yes            220mg      220 mg,           [facility]



     sulfate      3-01                               Oral,           ity of



     (ORAZINC)      18:00:                               DAILY,           [place]



     capsule 220      00                                 First dose           Me

dical



     mg                                           on Mon           [facility]



                                                  3/1/21 at           



                                                  1200,           



                                                  Until           



                                                  Discontinu           



                                                  ed,            



                                                  Routine           

 

     thiamine            Yes            100mg      100 mg,           Unive

rs



     (VITAMIN      3                               Oral,           ity of



     B1) tablet      18:00:                               DAILY,           [place]



     100 mg      00                                 First dose           Medical



                                                  on Mon           [facility]



                                                  3/1/21 at           



                                                  1200,           



                                                  Until           



                                                  Discontinu           



                                                  ed,            



                                                  Routine           

 

     azithromyci      2021- No             500mg      500 mg,           U

nivers



     n         3-01 03-03                          Oral,           ity of



     (ZITHROMAX)      17:45: 14:52                          DAILY, 3           T

[facility]



     tablet 500      00   :00                           doses,           Medical



     mg                                           First dose           [facility]



                                                  on Mon           



                                                  3/1/21 at           



                                                  1145, Last           



                                                  dose on           



                                                  Wed 3/3/21           



                                                  at 0900,           



                                                  ASAP<br>Re           



                                                  ason for           



                                                  Anti-Infec           



                                                  tive:           



                                                  Empir[facility]           



                                                  Therapy           



                                                  for            



                                                  Suspected           



                                                  Infection<           



                                                  br>Empiric           



                                                  Therapy           



                                                  Site: Skin           



                                                  / Soft           



                                                  tissue<br>           



                                                  Duration           



                                                  of             



                                                  therapy:           



                                                  72 hours           

 

     acetaminoph            Yes            650mg      650 mg,           Un

ethan



     en        3-01                               Oral,           ity of



     (TYLENOL)      17:41:                               Q6HPRN,           [place]



     tablet 650      38                                 Starting           Medic

al



     mg                                           Mon 3/1/21           [facility]



                                                  at 1141,           



                                                  Until           



                                                  Discontinu           



                                                  ed,            



                                                  Routine,           



                                                  Pain           



                                                  (scale           



                                                  1-3)           

 

     ibuprofen      2021- No             800mg      800 mg,           Uni

vers



     (IBU)      3-01 03-01                          Oral,           ity of



     tablet 800      17:00: 15:56                          ONCE, 1           [facility]

as



     mg        00   :00                           dose, Mon           Medical



                                                  3/1/21 at           [facility]



                                                  1100, ASAP           

 

     budesonide-      2019      Yes            2{puff}      Inhale 2          

 [facility]



     formoterol      1-25                               Puffs 2           ity of



     (SYMBICORT)      22:32:                               (two)           [place]



     160-4.5      24                                 times           Medical



     mcg/actuati                                         daily.           [facility]



     on inhaler                                                        

 

     predniSONE      2019      Yes       [id number] 40mg      Take 2           U

nivers



     20 mg      1-25                               tablets by           ity of



     tablet      00:00:                               mouth           [place]



               00                                 daily.           Medical



                                                                 [facility]

 

     albuterol      2019      Yes       [id number] 2{puff}      Inhale 2        

   [facility]



     90        1-25                               Puffs           ity of



     mcg/actuati      00:00:                               every 4           [facility]

as



     on inhaler      00                                 (four)           Medical



                                                  hours as           [facility]



                                                  needed for           



                                                  Wheezing           



                                                  or             



                                                  Shortness           



                                                  of Breath.           

 

     azithromyci      2019      Yes       [id number] 250mg      Take 1          

 [facility]



     n         1-25                               tablet by           ity of



     (ZITHROMAX      00:00:                               mouth           [place]



     Z-ELIZABETH) 250      00                                 SEE-INSTRU           Med

ical



     mg tablet                                         CTIONS.           [facility]



                                                  Take 500           



                                                  mg day 1,           



                                                  then 250           



                                                  mg days 2           



                                                  to 5.           

 

     benzonatate      2019      Yes       [id number] 100mg      Take 1          

 [facility]



     100 mg      1-25                               capsule by           ity of



     capsule      00:00:                               mouth 3           [place]



               00                                 (three)           Medical



                                                  times           [facility]



                                                  daily as           



                                                  needed for           



                                                  Cough.           

 

     predniSONE      2019- No        [id number] 40mg      Take 2           

[facility]



     20 mg      1-25 03-08                          tablets by           ity of



     tablet      00:00: 00:00                          mouth           [place]



               00   :00                           daily.           Medical



                                                                 [facility]

 

     albuterol      2019- No        [id number] 2{puff}      Inhale 2       

    [facility]



     90         03-08                          Puffs           ity of



     mcg/actuati      00:00: 00:00                          every 4           [facility]

[facility]



     on inhaler      00   :00                           (four)           Medical



                                                  hours as           [facility]



                                                  needed for           



                                                  Wheezing           



                                                  or             



                                                  Shortness           



                                                  of Breath.           

 

     azithromyci      2019- No        [id number] 250mg      Take 1         

  [facility]



     n         08                          tablet by           ity of



     (ZITHROMAX      00:00: 00:00                          mouth           [place]



     Z-ELIZABETH) 250      00   :00                           SEE-INSTRU           Med

ical



     mg tablet                                         CTIONS.           [facility]



                                                  Take 500           



                                                  mg day 1,           



                                                  then 250           



                                                  mg days 2           



                                                  to 5.           

 

     benzonatate      2019- No        [id number] 100mg      Take 1         

  [facility]



     100 mg      08                          capsule by           ity of



     capsule      00:00: 00:00                          mouth 3           [place]



               00   :00                           (three)           Medical



                                                  times           [facility]



                                                  daily as           



                                                  needed for           



                                                  Cough.           

 

     budesonide-            Yes            2{puff}      Inhale 2          

 [facility]



     formoterol      8-07                               Puffs 2           ity of



     (SYMBICORT)      13:22:                               (two)           [place]



     160-4.5      24                                 times           Medical



     mcg/actuati                                         daily.           [facility]



     on inhaler                                                        

 

     budesonide-            Yes            2{puff}      Inhale 2          

 [facility]



     formoterol      8-07                               Puffs 2           ity of



     (SYMBICORT)      13:22:                               (two)           [place]



     160-4.5      24                                 times           Medical



     mcg/actuati                                         daily.           [facility]



     on inhaler                                                        

 

     Levothyroxi            Yes            137ug      Take 137           U

nivers



     ne 137 mcg      8-07                               mcg by           ity of



     Cap       00:00:                               mouth           [place]



               00                                 daily.           Medical



                                                                 [facility]

 

     Levothyroxi            Yes            137ug      Take 137           U

nivers



     ne 137 mcg      8-07                               mcg by           ity of



     Cap       00:00:                               mouth           [place]



               00                                 daily.           Medical



                                                                 [facility]

 

     Levothyroxi            Yes            137ug      Take 137           U

nivers



     ne 137 mcg      8-07                               mcg by           ity of



     Cap       00:00:                               mouth           [place]



               00                                 daily.           Medical



                                                                 [facility]

 

     Levothyroxi            Yes            137ug      Take 137           U

nivers



     ne 137 mcg      8-07                               mcg by           ity of



     Cap       00:00:                               mouth           [place]



               00                                 daily.           Medical



                                                                 [facility]

 

     Levothyroxi            Yes            137ug      Take 137           U

nivers



     ne 137 mcg      8-07                               mcg by           ity of



     Cap       00:00:                               mouth           [place]



               00                                 daily.           Medical



                                                                 [facility]

 

     Levothyroxi            Yes            137ug      Take 137           [facility]

nivers



     ne 137 mcg      8-07                               mcg by           [facility] of



     Cap       00:00:                               mouth           [place]



               00                                 daily.           Medical



                                                                 Branch

 

     Levothyroxi            Yes            137ug      Take 137           [facility]

nivers



     ne 137 mcg      8-07                               mcg by           ity of



     Cap       00:00:                               mouth           [place]



               00                                 daily.           Medical



                                                                 Branch

 

     Levothyroxi            Yes            137ug      Take 137           U

nivers



     ne 137 mcg      8-07                               mcg by           ity of



     Cap       00:00:                               mouth           [place]



               00                                 daily.           Medical



                                                                 Branch

 

     Levothyroxi            Yes            137ug      Take 137           U

nivers



     ne 137 mcg      8-07                               mcg by           ity of



     Cap       00:00:                               mouth           [place]



               00                                 daily.           Medical



                                                                 Branch

 

     Levothyroxi            Yes            137ug      Take 137           [facility]

nivers



     ne 137 mcg      8-07                               mcg by           [facility] of



     Cap       00:00:                               mouth           [place]



               00                                 daily.           Medical



                                                                 Branch

 

     oseltamivir      2017      Yes            75mg      Take 1           Un[facility]

ers



     (TAMIFLU)      2-26                               capsule by           [facility] 

of



     75 mg      00:00:                               mouth 2           [place]



     capsule      00                                 (two)           Medical



                                                  times           Branch



                                                  daily.           

 

     oseltamivir      2017      Yes            75mg      Take 1           Univ

ers



     (TAMIFLU)      2-26                               capsule by           [facility] 

of



     75 mg      00:00:                               mouth 2           [place]



     capsule      00                                 (two)           Medical



                                                  times           Branch



                                                  daily.           

 

     ALBUTEROL            Yes            2.5mg      Inhale 3           Uni

vers



     2.5 mg /3      5-20                               mL every 6           ity 

of



     mL (0.083      00:00:                               (six)           [place]



     %)        00                                 hours as           Medical



     nebulizer                                         needed for           [facility]

ch



     solution                                         Wheezing           



                                                  or             



                                                  Shortness           



                                                  of Breath.           



                                                  May also           



                                                  nebulize           



                                                  one extra           



                                                  every 6           



                                                  hours.           

 

     ALBUTEROL            Yes            2{puff}      Inhale 2           U

nivers



     90        5-20                               Puffs           ity of



     mcg/actuati      00:00:                               every 4           [facility]

[facility]



     on inhaler      00                                 (four)           Medical



                                                  hours as           Branch



                                                  needed for           



                                                  Wheezing           



                                                  or             



                                                  Shortness           



                                                  of Breath.           

 

     ALBUTEROL            Yes            2.5mg      Inhale 3           Uni

vers



     2.5 mg /3      5-20                               mL every 6           ity 

of



     mL (0.083      00:00:                               (six)           [place]



     %)        00                                 hours as           Medical



     nebulizer                                         needed for           [facility]

ch



     solution                                         Wheezing           



                                                  or             



                                                  Shortness           



                                                  of Breath.           



                                                  May also           



                                                  nebulize           



                                                  one extra           



                                                  every 6           



                                                  hours.           

 

     ALBUTEROL      -      Yes            2{puff}      Inhale 2           U

nivers



     90        5-20                               Puffs           [facility] of



     mcg/actuati      00:00:                               every 4           [facility]

[facility]



     on inhaler      00                                 (four)           Medical



                                                  hours as           Branch



                                                  needed for           



                                                  Wheezing           



                                                  or             



                                                  Shortness           



                                                  of Breath.           

 

     ALBUTEROL            Yes            2.5mg      Inhale 3           Uni

vers



     2.5 mg /3      5-20                               mL every 6           ity 

of



     mL (0.083      00:00:                               (six)           [place]



     %)        00                                 hours as           Medical



     nebulizer                                         needed for           [facility]

ch



     solution                                         Wheezing           



                                                  or             



                                                  Shortness           



                                                  of Breath.           



                                                  May also           



                                                  nebulize           



                                                  one extra           



                                                  every 6           



                                                  hours.           

 

     ALBUTEROL            Yes            2{puff}      Inhale 2           U

nivers



     90        5-20                               Puffs           ity of



     mcg/actuati      00:00:                               every 4           [facility]

[facility]



     on inhaler      00                                 (four)           Medical



                                                  hours as           Branch



                                                  needed for           



                                                  Wheezing           



                                                  or             



                                                  Shortness           



                                                  of Breath.           

 

     ALBUTEROL            Yes            2.5mg      Inhale 3           Uni

vers



     2.5 mg /3      5-20                               mL every 6           ity 

of



     mL (0.083      00:00:                               (six)           [place]



     %)        00                                 hours as           Medical



     nebulizer                                         needed for           [facility]

ch



     solution                                         Wheezing           



                                                  or             



                                                  Shortness           



                                                  of Breath.           



                                                  May also           



                                                  nebulize           



                                                  one extra           



                                                  every 6           



                                                  hours.           

 

     ALBUTEROL            Yes            2{puff}      Inhale 2           U

nivers



     90        5-20                               Puffs           ity of



     mcg/actuati      00:00:                               every 4           [facility]

[facility]



     on inhaler      00                                 (four)           Medical



                                                  hours as           Branch



                                                  needed for           



                                                  Wheezing           



                                                  or             



                                                  Shortness           



                                                  of Breath.           

 

     ALBUTEROL            Yes            2.5mg      Inhale 3           Uni

vers



     2.5 mg /3      5-20                               mL every 6           ity 

of



     mL (0.083      00:00:                               (six)           [place]



     %)        00                                 hours as           Medical



     nebulizer                                         needed for           [facility]

ch



     solution                                         Wheezing           



                                                  or             



                                                  Shortness           



                                                  of Breath.           



                                                  May also           



                                                  nebulize           



                                                  one extra           



                                                  every 6           



                                                  hours.           

 

     ALBUTEROL      0      Yes            2{puff}      Inhale 2           U

nivers



     90        5-20                               Puffs           ity of



     mcg/actuati      00:00:                               every 4           [facility]

[facility]



     on inhaler      00                                 (four)           Medical



                                                  hours as           [facility]anch



                                                  needed for           



                                                  Wheezing           



                                                  or             



                                                  Shortness           



                                                  of Breath.           

 

     ALBUTEROL            Yes            2.5mg      Inhale 3           Uni

vers



     2.5 mg /3      5-20                               mL every 6           ity 

of



     mL (0.083      00:00:                               (six)           [place]



     %)        00                                 hours as           Medical



     nebulizer                                         needed for           [facility]

ch



     solution                                         Wheezing           



                                                  or             



                                                  Shortness           



                                                  of Breath.           



                                                  May also           



                                                  nebulize           



                                                  one extra           



                                                  every 6           



                                                  hours.           

 

     ALBUTEROL      -0      Yes            2{puff}      Inhale 2           U

nivers



     90        5-20                               Puffs           [facility] of



     mcg/actuati      00:00:                               every 4           [facility]

[facility]



     on inhaler      00                                 (four)           Medical



                                                  hours as           Branch



                                                  needed for           



                                                  Wheezing           



                                                  or             



                                                  Shortness           



                                                  of Breath.           

 

     ZITHROMAX      2017-0      Yes            250mg      Take 1           Un[facility]

rs



     Z-ELIZABETH 250      5-20                               tablet by           ity o

f



     mg dose      00:00:                               mouth           [place]



     pack      00                                 SEE-INSTRU           Medical



                                                  CTIONS.           Branch



                                                  Take 500           



                                                  mg day 1,           



                                                  then 250           



                                                  mg days 2           



                                                  to 5.           

 

     ALBUTEROL      -0      Yes            2.5mg      Inhale 3           Uni

vers



     2.5 mg /3      5-20                               mL every 6           ity 

of



     mL (0.083      00:00:                               (six)           [place]



     %)        00                                 hours as           Medical



     nebulizer                                         needed for           [facility]

ch



     solution                                         Wheezing           



                                                  or             



                                                  Shortness           



                                                  of Breath.           



                                                  May also           



                                                  nebulize           



                                                  one extra           



                                                  every 6           



                                                  hours.           

 

     ALBUTEROL      -      Yes            2{puff}      Inhale 2           U

nivers



     90        5-20                               Puffs           ity of



     mcg/actuati      00:00:                               every 4           [facility]

[facility]



     on inhaler      00                                 (four)           Medical



                                                  hours as           [facility]



                                                  needed for           



                                                  Wheezing           



                                                  or             



                                                  Shortness           



                                                  of Breath.           

 

     ZITHROMAX      2017-0      Yes            250mg      Take 1           [facility]

rs



     Z-ELIZABETH 250      5-20                               tablet by           ity o

f



     mg dose      00:00:                               mouth           [place]



     pack      00                                 SEE-INSTRU           Medical



                                                  CTIONS.           Branch



                                                  Take 500           



                                                  mg day 1,           



                                                  then 250           



                                                  mg days 2           



                                                  to 5.           

 

     ALBUTEROL      2017-0      Yes            2.5mg      Inhale 3           Uni

vers



     2.5 mg /3      5-20                               mL every 6           ity 

of



     mL (0.083      00:00:                               (six)           [place]



     %)        00                                 hours as           Medical



     nebulizer                                         needed for           [facility]

ch



     solution                                         Wheezing           



                                                  or             



                                                  Shortness           



                                                  of Breath.           



                                                  May also           



                                                  nebulize           



                                                  one extra           



                                                  every 6           



                                                  hours.           

 

     ALBUTEROL      -0      Yes            2{puff}      Inhale 2           U

nivers



     90        5-20                               Puffs           [facility] of



     mcg/actuati      00:00:                               every 4           [facility]

[facility]



     on inhaler      00                                 (four)           Medical



                                                  hours as           [facility]



                                                  needed for           



                                                  Wheezing           



                                                  or             



                                                  Shortness           



                                                  of Breath.           

 

     ALBUTEROL            Yes            2.5mg      Inhale 3           Uni

vers



     2.5 mg /3      5-20                               mL every 6           ity 

of



     mL (0.083      00:00:                               (six)           [place]



     %)        00                                 hours as           Medical



     nebulizer                                         needed for           [facility]

ch



     solution                                         Wheezing           



                                                  or             



                                                  Shortness           



                                                  of Breath.           



                                                  May also           



                                                  nebulize           



                                                  one extra           



                                                  every 6           



                                                  hours.           

 

     ALBUTEROL            Yes            2{puff}      Inhale 2           U

nivers



     90        5-20                               Puffs           ity of



     mcg/actuati      00:00:                               every 4           [facility]

[facility]



     on inhaler      00                                 (four)           Medical



                                                  hours as           [facility]



                                                  needed for           



                                                  Wheezing           



                                                  or             



                                                  Shortness           



                                                  of Breath.           

 

     ALBUTEROL            Yes            2.5mg      Inhale 3           Uni

vers



     2.5 mg /3      5-20                               mL every 6           ity 

of



     mL (0.083      00:00:                               (six)           [place]



     %)        00                                 hours as           Medical



     nebulizer                                         needed for           [facility]

ch



     solution                                         Wheezing           



                                                  or             



                                                  Shortness           



                                                  of Breath.           



                                                  May also           



                                                  nebulize           



                                                  one extra           



                                                  every 6           



                                                  hours.           

 

     ALBUTEROL            Yes            2{puff}      Inhale 2           U

nivers



     90        5-20                               Puffs           ity of



     mcg/actuati      00:00:                               every 4           [facility]

[facility]



     on inhaler      00                                 (four)           Medical



                                                  hours as           Br[facility]



                                                  needed for           



                                                  Wheezing           



                                                  or             



                                                  Shortness           



                                                  of Breath.           

 

     FLUTICASONE            Yes                      Inhale           Univ

ers



     /VILANTEROL      4-22                               daily.           ity of



     (BREO      16:25:                                              [place]



     ELLIPTA      10                                                Medical



     INHALE)                                                        Branch

 

     FLUTICASONE      0      Yes                      Inhale           Univ

ers



     /VILANTEROL      4-22                               daily.           ity of



     (BREO      16:25:                                              [place]



     ELLIPTA      10                                                Medical



     INHALE)                                                        Branch

 

     meclizine            Yes                      TAKE 1           Univer

s



     (ANTIVERT)      8-21                               TABLET BY           ity 

of



     25 mg      00:00:                               MOUTH           [place]



     tablet      00                                 EVERY 8           Medical



                                                  HOURS AS           Branch



                                                  NEEDED           

 

     meclizine            Yes                      TAKE 1           Univer

s



     (ANTIVERT)      8-21                               TABLET BY           ity 

of



     25 mg      00:00:                               MOUTH           [place]



     tablet      00                                 EVERY 8           Medical



                                                  HOURS AS           Branch



                                                  NEEDED           







Vital Signs







             Vital Name   Observation Time Observation Value Comments     Source

 

             Systolic blood 2022 11:00:00 129 mm[Hg]                Univer

sity of



             pressure                                            [facility]

 

             Diastolic blood 2022 11:00:00 85 mm[Hg]                 Unive

rsity of



             pressure                                            [facility]



                                                                 Branch

 

             Heart rate   2022 11:00:00 73 /min                   Universi

ty of



                                                                 [facility]



                                                                 Branch

 

             Respiratory rate 2022 11:00:00 20 /min                   Univ

ersity of



                                                                 [facility]



                                                                 Branch

 

             Oxygen saturation in 2022 11:00:00 94 /min                   

University of



             Arterial blood by                                        [place] [facility]

yeni



             Pulse oximetry                                        Branch

 

             Body temperature 2022 09:34:21 36.11 Cathy                 Univ

ersity of



                                                                 [facility]



                                                                 Branch

 

             Body weight  2022 09:29:00 79 kg                     Universi

ty of



                                                                 [facility]



                                                                 Branch

 

             BMI          2022 09:29:00 26.48 kg/m2               Universi

ty of



                                                                 [facility]



                                                                 Branch

 

             Systolic blood 2021 22:35:00 134 mm[Hg]                Univer

sity of



             pressure                                            [facility]



                                                                 Branch

 

             Diastolic blood 2021 22:35:00 88 mm[Hg]                 Unive

rsity of



             pressure                                            [facility]



                                                                 Branch

 

             Heart rate   2021 22:35:00 88 /min                   Universi

ty of



                                                                 [facility]



                                                                 Branch

 

             Body temperature 2021 22:35:00 36.39 Cathy                 Univ

ersity of



                                                                 [facility]



                                                                 Branch

 

             Respiratory rate 2021 22:35:00 18 /min                   Univ

ersity of



                                                                 [facility]



                                                                 Branch

 

             Body weight  2021 22:35:00 79.379 kg                 Universi

ty of



                                                                 [place] [facility]



                                                                 Branch

 

             BMI          2021 22:35:00 26.61 kg/m2               Universi

ty of



                                                                 [place] [facility]



                                                                 Branch

 

             Oxygen saturation in 2021 22:35:00 97 /min                   

University of



             Arterial blood by                                        [place] [facility]

yeni



             Pulse oximetry                                        Branch

 

             Systolic blood 2021 21:00:00 107 mm[Hg]                Univer

sity of



             pressure                                            [facility]



                                                                 Branch

 

             Diastolic blood 2021 21:00:00 66 mm[Hg]                 Unive

rsity of



             pressure                                            [facility]



                                                                 Branch

 

             Body temperature 2021 21:00:00 36.67 Cathy                 Univ

ersity of



                                                                 [facility]



                                                                 Branch

 

             Heart rate   2021 10:01:00 64 /min                   Universi

ty of



                                                                 [place] [facility]



                                                                 Branch

 

             Respiratory rate 2021 10:01:00 18 /min                   Univ

ersity of



                                                                 [facility]



                                                                 Branch

 

             Oxygen saturation in 2021 10:01:00 97 /min                   

University [facility]



             Arterial blood by                                        [place] [facility]

yeni



             Pulse oximetry                                        Branch

 

             Body height  2021 17:45:00 172.7 cm                  [facility]

 

             Body weight  2021 17:45:00 79.47 kg                  [facility]

 

             BMI          2021 17:45:00 26.64 kg/m2               [facility]







Procedures







                Procedure       Date / Time     Performing Clinician Source



                                Performed                       

 

                COMP. METABOLIC PANEL 2022 09:45:00 Matt Lopez [facility]



                ([id number])                                         [facility] [place]

 

                CBC WITH DIFF   2022 09:45:00 John Waotis [facility]

 

                COVID-19 (ID NOW RAPID 2022 09:45:00 [facility]



                TESTING)                                        [facility]

 

                XR CHEST 2 VW   2021 23:43:46 Chhaya Reyes [facility]

 

                CONSENT/REFUSAL FOR 2021 22:29:32 Doctor Unassigned, No Un

[facility]



                DIAGNOSIS AND TREATMENT                 Name            Medical 

[place]

 

                BASIC METABOLIC PANEL 2021 11:39:00 [facility]



                (NA, K, CL, CO2,                                 [facility]



                GLUCOSE, BUN,                                   



                CREATININE, CA)                                 

 

                CBC WITH DIFF   2021 11:39:00 [facility]

 

                BASIC METABOLIC PANEL 2021 10:47:00 [facility]



                (NA, K, CL, CO2,                                 [facility]



                GLUCOSE, BUN,                                   



                CREATININE, CA)                                 

 

                CBC WITH DIFF   2021 10:47:00 [facility]

 

                C-REACTIVE PROTEIN 2021 11:18:00 Marcos Buchanan [facility]

 

                COMP. METABOLIC PANEL 2021 11:18:00 Marcos Buchanan [facility]



                ([id number])                                         [facility]

 

                CBC WITH DIFF   2021 11:18:00 Marcos Buchanan [facility]

 

                PROCALCITONIN   2021 11:18:00 Chanel Marcos [facility]

 

                POCT GLUCOSE (AUTOMATED) 2021 17:38:00 Marcos Buchanan [facility]

 

                THYROID STIMULATING 2021 09:59:00 Marcos Buchanan [facility]



                HORMONE                                         [facility]

 

                BASIC METABOLIC PANEL 2021 09:59:00 Marcos Buchanan [facility]



                (NA, K, CL, CO2,                                 Medical Branch



                GLUCOSE, BUN,                                   



                CREATININE, CA)                                 

 

                CBC WITH DIFF   2021 09:59:00 Chanel [facility]

 

                URINALYSIS      2021 03:16:00 Migue [facility]

 

                PNEUMOCOCCAL ANTIGEN 2021 03:16:00 Chanel Marcos [facility]

 

                LACTATE DEHYDROGENASE 2021 18:48:00 Chanel [facility]

 

                C-REACTIVE PROTEIN 2021 18:48:00 Chanel [facility]

 

                VITAMIN D, 25-OH 2021 18:48:00 Chanel [facility]

 

                PROCALCITONIN   2021 18:48:00 Chanel [facility]

 

                CT CHEST PULMONARY 2021 18:15:58 Chanel Marcos [facility]



                ANGIOGRAM                                       [facility]

 

                HB ECG ROUTINE & RHYTHM 2021 16:14:47 Michael Camarena [facility]



                STRIP                                           [facility]

 

                XR CHEST 1 VW   2021 16:14:07 Michael Camarena [facility]

 

                BLOOD CULTURE SCREEN 2021 16:05:00 Michael Camarena [facility]

 

                COVID-19 (ID NOW RAPID 2021 16:05:00 Michael Camarena [facility]



                TESTING)                                        Medical Branch

 

                BLOOD CULTURE SCREEN 2021 16:04:00 Michael Camarena [facility]

 

                CREATINE KINASE 2021 16:04:00 Chanel [facility]

 

                FERRITIN SERUM  2021 16:04:00 Chanel [facility]

 

                TROPONIN I      2021 16:04:00 Michael Camarena [facility]

 

                HEPATIC FUNCTION PANEL 2021 16:04:00 Michael Camarena [facility]



                ([id number]) (ALB,T.PRO,BILI                                 Medical 

Branch



                T,BU/BC,ALT,AST,ALK                                 



                PHOS)                                           

 

                BASIC METABOLIC PANEL 2021 16:04:00 Michael Camarena [facility]



                (NA, K, CL, CO2,                                 Medical Branch



                GLUCOSE, BUN,                                   



                CREATININE, CA)                                 

 

                CBC WITH DIFF   2021 16:04:00 Migue Michael [facility]

 

                GLYCOSYLATED HEMOGLOBIN 2021 16:04:00 [facility]



                (A1C)                                           [facility]

 

                PROTHROMBIN TIME / INR 2021 16:04:00 Migue Michael [facility]

 

                D-DIMER         2021 16:04:00 [facility]

 

                ACTIVATED PARTIAL 2021 16:04:00 Migue [facility]



                THRMPLAS ROSE                                    [facility]

 

                N-TERMINAL PRO-BNP 2021 16:04:00 [facility]

 

                NOTICE OF PRIVACY 2021 15:19:40 Doctor Unassigned, No [facility]



                PRACTICES                       Name            Medical Branch

 

                CONSENT/REFUSAL FOR 2021 15:19:24 Doctor Unassigned, No [facility]



                DIAGNOSIS AND TREATMENT                 Name            Medical 

Branch

 

                CONSENT/REFUSAL FOR 2019 20:22:33 Doctor Unassigned, No [facility]



                DIAGNOSIS AND TREATMENT                 [facility]

 

                ASSIGNMENT OF BENEFITS 2019 20:22:19 Doctor Unassigned, No

 [facility]







Encounters







        Start   End     Encounter Admission Attending Care    Care    Encounter 

Source



        Date/Time Date/Time Type    Type    Clinicians Facility Department ID   

   

 

        2021-10-31         Emergency                 [facility]    [id number] 

Univers



        02:01:55                                                         [facility]

 

        2022 Emergency X       JOHN [facility]    ERT     [id number]

04 Univers



        03:24:00 05:45:00                 [facility]

 

        2022 Emergency         [facility]    [id number]

[id number] Univers



        03:24:00 05:45:00                 Matt TATUM 350.1.13.10         

ity of



                                                [place] [id number]         [facility]  [phone]         Medi

yeni



                                                        084             Branch

 

        2021 Emergency X       [facility]    ERT     [id number]

534 Univers



        16:37:00 18:38:00                 CHHAYA                         ity of



                                                                        [facility]

 

        2021 Emergency         [facility]    1.2.840.114 [id number]

[id number] Univers



        16:37:00 18:38:00                 Chhaya TATUM 350.1.13.10         i

ty of



                                                [place] [id number]         [facility]  [phone]         Medi

yeni



                                                        084             Branch

 

        2021 Orders          Doctor  JULIO    [id number]

70 Univers



        00:00:00 00:00:00 Only            Unassigned, MAYRA   350.1.13.10       

  ity of



                                        [facility] [id number]         Matthew

as



                                                        [phone]         Medi

yeni



                                                        009             Branch

 

        2021 Patient         Luis  [facility]    [id number]

96 Univers



        00:00:00 00:00:00 Outreach         Bernardo  PRIMARY [id number]         i

ty of



                                        Beni  CARE    [id number]         [place]

[place]



                                                [place] [phone]         Me

dical



                                                        388             Branch

 

        2021-03-10 2021-03-10 Patient         DayronDarya Arelis  1.2.840.114 [id number]

[id number] Univers



        00:00:00 00:00:00 Outreach         E       [place]   350.1.13.10         i

ty of



                                                [place]   [id number]         [place]

[place]



                                                        [phone]         Medi

yeni



                                                        403             Branch

 

        2021 Transition         Arelis Wilson  [id number]

[id number] Univers



        00:00:00 00:00:00 of Care         Katie De Jesusy   350.1.13.10        

 ity of



                                                [place]   [id number]         [place]

[place]



                                                        [phone]         Medi

yeni



                                                        403             Branch

 

        2021 Hospital         Michael Camarena [facility]    1.2.840.1

[id number] 

Univers



        09:36:00 16:20:00 Encounter         Marcos Buchanan 350.1.13.10

         ity of



                                                [place] [id number]         [place]

[place]



                                                [place]  [phone]         Medi

yeni



                                                        080             Branch

 

        2021 Orders          Doctor  JULIO    1.2.840.114 [id number]

23 Univers



        00:00:00 00:00:00 Only            Unassigned, MAYRA   350.1.13.10       

  ity of



                                        [facility] [id number]         Matthew

as



                                                        [phone]         [address]

 

        2020 Outpatient                 [facility]    [id number]

-[id number] Univers



        13:30:00 13:30:00                                         [id number]  ity [facility]

 

        2020 Outpatient R       RADIOLOGY [facility]    [id number]

[id number] Univers



        00:00:00 00:00:00                                                 ity of



                                                                        [facility]

 

        2019 Orders          Doctor  JULIO    1.2.840.114 [id number]

53 



        00:00:00 00:00:00 Only            Unassigned, MAYRA   350.1.13.10       

  



                                        [facility] [id number]         



                                                        [phone]         



                                                        009             

 

        2019 Orders          Doctor KIM    1.2.840.114 [id number]

53 Univers



        00:00:00 00:00:00 Only            Unassigned, MAYRA   350.1.13.10       

  ity of



                                        [facility] [id number]         Matthew

as



                                                        [phone]         [address]

 

        2019 Telephone         Jacky    [facility]    [id number]

[id number] 



        00:00:00 00:00:00                 Ava Tatum 350.1.13.10         



                                                [place] [id number]         



                                                [place] [phone]         



                                                [address]                 

 

        2019 Telephone         [facility]    [id number]

[id number] Univers



        00:00:00 00:00:00                 Ava Tatum 350.1.13.10         i

ty of



                                                [place] [id number]         [place]

[place]



                                                [place] [phone]         Me

dical



                                                nal     [address]             Branch



                                                Building                 







Results







           Test Description Test Time  Test Comments Results    Result Comments 

Source









                    CBC WITH DIFF       2022 10:57:06 









                      Test Item  Value      Reference Range Interpretation Comme

nts









             WBC (test code = 6690-2)              See_Comment  H             [A

utomated message] The



                                                                 system which ge

nerated this



                                                                 result transmit

rodrick reference



                                                                 range: 4.20 - 1

0.70 10*3/?L.



                                                                 The reference r

kevin was not



                                                                 used to interpr

et this result



                                                                 as normal/abnor

mal.

 

             RBC (test code = 789-8)              See_Comment                [Au

tomated message] The



                                                                 system which ge

nerated this



                                                                 result transmit

rodrick reference



                                                                 range: 4.26 - 5

.52 10*6/?L.



                                                                 The reference r

kevin was not



                                                                 used to interpr

et this result



                                                                 as normal/abnor

mal.

 

             HGB (test code = 718-7) 15.2 g/dL    12.2-16.4                 

 

             HCT (test code = 4544-3) 45.0 %       38.4-49.3                 

 

             MCV (test code = 787-2) 87.0 fL      81.7-95.6                 

 

             MCH (test code = 785-6) 29.4 pg      26.1-32.7                 

 

             MCHC (test code = 786-4) 33.8 g/dL    31.2-35.0                 

 

             RDW-SD (test code = 31745-9) 42.8 fL      38.5-51.6                

 

 

             RDW-CV (test code = 788-0) 13.5 %       12.1-15.4                 

 

             PLT (test code = 777-3)              See_Comment                [Au

tomated message] The



                                                                 system which ge

nerated this



                                                                 result transmit

rodrick reference



                                                                 range: 150 - 32

8 10*3/?L. The



                                                                 reference range

 was not used



                                                                 to interpret th

is result as



                                                                 normal/abnormal

.

 

             MPV (test code = 79054-6) 9.9 fL       9.8-13.0                  

 

             NRBC/100 WBC (test code =              See_Comment                [

Automated message] The



             [id number])                                         system which ge

nerated this



                                                                 result transmit

rodrick reference



                                                                 range: 0.0 - 10

.0 /100 WBCs.



                                                                 The reference r

kevin was not



                                                                 used to interpr

et this result



                                                                 as normal/abnor

mal.

 

             NRBC x10^3 (test code = <0.01        See_Comment                [Au

tomated message] The



             [id number])                                         system which [employer]

nerated this



                                                                 result transmit

rodrick reference



                                                                 range: 10*3/?L.

 The reference



                                                                 range was not u

sed to



                                                                 interpret this 

result as



                                                                 normal/abnormal

.

 

             GRAN MAT (NEUT) % (test code 46.9 %                                

 



             = 770-8)                                            

 

             IMM GRAN % (test code = 0.40 %                                 



             [id number])                                         

 

             LYMPH % (test code = 736-9) 19.3 %                                 

 

             MONO % (test code = 5905-5) 8.4 %                                  

 

             EOS % (test code = 713-8) 23.2 %                                 

 

             BASO % (test code = 706-2) 1.8 %                                  

 

             GRAN MAT x10^3(ANC) (test 5.17 10*3/uL 1.99-6.95                 



             code = 6331453139)                                        

 

             IMM GRAN x10^3 (test code = 0.04 10*3/uL 0.00-0.06                 



             [id number])                                         

 

             LYMPH x10^3 (test code = 2.12 10*3/uL 1.09-3.23                 



             731-0)                                              

 

             MONO x10^3 (test code = 0.93 10*3/uL 0.36-1.02                 



             742-7)                                              

 

             EOS x10^3 (test code = 2.55 10*3/uL 0.06-0.53    H            



             711-2)                                              

 

             BASO x10^3 (test code = 0.20 10*3/uL 0.01-0.09    H            



             704-7)                                              

 

             Lab Interpretation (test Abnormal                               



             code = 55874-1)                                        



[id number] 
[facility]40





             Test Item    Value        Reference Range Interpretation Comments

 

             NA (test code = 138 mmol/L   135-145                   



             [id number])                                         

 

             K (test code = 4.1 mmol/L   3.5-5.0                   



             2811467782)                                         

 

             CL (test code = 108 mmol/L                       



             4925640704)                                         

 

             CO2 TOTAL (test code = 23 mmol/L    23-31                     



             4961659518)                                         

 

             AGAP (test code =              2-16                      



             8790261318)                                         

 

             BUN (test code = 22 mg/dL     7-23                      



             9059449201)                                         

 

             GLUCOSE (test code = 117 mg/dL           H            



             1719051613)                                         

 

             CREATININE (test code = 1.20 mg/dL   0.60-1.25                 



             2512263493)                                         

 

             TOTAL BILI (test code = 0.5 mg/dL    0.1-1.1                   



             0045342797)                                         

 

             CALCIUM (test code = 8.8 mg/dL    8.6-10.6                  



             7811202282)                                         

 

             T PROTEIN (test code = 7.0 g/dL     6.3-8.2                   



             7968095550)                                         

 

             ALBUMIN (test code = 4.6 g/dL     3.5-5.0                   



             9090607937)                                         

 

             ALK PHOS (test code = 73 U/L                           



             5699221379)                                         

 

             ALTv (test code = 31 U/L       5-50                      



             1742-6)                                             

 

             AST(SGOT) (test code = 38 U/L       13-40                     



             5002436065)                                         

 

             eGFR (test code =              mL/min/1.73m2              



             8025534344)                                         

 

             MISA (test code = MISA) Association of                           



                          Glomerular Filtration                           



                          Rate (GFR) and Staging                           



                          of Kidney Disease*                           



                          +---------------------                           



                          --+-------------------                           



                          --+-------------------                           



                          ------+| GFR                           



                          (mL/min/1.73 m2) ?|                           



                          With Kidney Damage ?|                           



                          ?Without Kidney                           



                          Damage+---------------                           



                          --------+-------------                           



                          --------+-------------                           



                          ------------+| ?>90 ?                           



                          ? ? ? ? ? ? ? ?|                           



                          ?Stage one ? ? ? ? ?|                           



                          ? Normal ? ? ? ? ? ? ?                           



                          ?+--------------------                           



                          ---+------------------                           



                          ---+------------------                           



                          -------+| ?60-89 ? ? ?                           



                          ? ? ? ? ?| ?Stage two                           



                          ? ? ? ? ?| ? Decreased                           



                          GFR ? ? ? ?                            



                          +---------------------                           



                          --+-------------------                           



                          --+-------------------                           



                          ------+| ?30-59 ? ? ?                           



                          ? ? ? ? ?| ?Stage                           



                          three ? ? ? ?| ? Stage                           



                          three ? ? ? ? ?                           



                          +---------------------                           



                          --+-------------------                           



                          --+-------------------                           



                          ------+| ?15-29 ? ? ?                           



                          ? ? ? ? ?| ?Stage four                           



                          ? ? ? ? | ? Stage four                           



                          ? ? ? ? ?                              



                          ?+--------------------                           



                          ---+------------------                           



                          ---+------------------                           



                          -------+| ?<15 (or                           



                          dialysis) ? ?| ?Stage                           



                          five ? ? ? ? | ? Stage                           



                          five ? ? ? ? ?                           



                          ?+--------------------                           



                          ---+------------------                           



                          ---+------------------                           



                          -------+ *Each stage                           



                          assumes the associated                           



                          GFR level has been in                           



                          effect for at least                           



                          three months. ?Stages                           



                          1 to 5, with or                           



                          without kidney                           



                          disease, indicate                           



                          chronic kidney                           



                          disease. Notes:                           



                          Determination of                           



                          stages one and two                           



                          (with eGFR                             



                          >59mL/min/1.73 m2)                           



                          requires estimation of                           



                          kidney damage for at                           



                          least three months as                           



                          defined by structural                           



                          or functional                           



                          abnormalities of the                           



                          kidney, manifested by                           



                          either:Pathological                           



                          abnormalities or                           



                          Markers of kidney                           



                          damage (including                           



                          abnormalities in the                           



                          composition of the                           



                          blood or urine or                           



                          abnormalities in                           



                          imaging tests).                           

 

             Lab Interpretation Abnormal                               



             (test code = 96780-9)                                        



[facility] METABOLIC PANEL (NA, K, CL, CO2, 
GLUCOSE, BUN, CREATININE, CA)2021 12:18:34





             Test Item    Value        Reference Range Interpretation Comments

 

             NA (test code = 137 mmol/L   135-145                   



             6722851050)                                         

 

             K (test code = 4.0 mmol/L   3.5-5.0                   



             9483253930)                                         

 

             CL (test code = 102 mmol/L                       



             8680316045)                                         

 

             CO2 TOTAL (test code = 30 mmol/L    23-31                     



             9849189830)                                         

 

             AGAP (test code =              2-16                      



             7910275466)                                         

 

             BUN (test code = 22 mg/dL     7-23                      



             7825244280)                                         

 

             GLUCOSE (test code = 102 mg/dL                        



             8126897427)                                         

 

             CREATININE (test code = 0.69 mg/dL   0.60-1.25                 



             5438756457)                                         

 

             CALCIUM (test code = 8.5 mg/dL    8.6-10.6     L            



             9362954700)                                         

 

             eGFR Calculation              mL/min/1.73m2              



             (Non-)                                        



             (test code =                                        



             9751101223)                                         

 

             eGFR Calculation              mL/min/1.73m2              



             (African American)                                        



             (test code =                                        



             1813989474)                                         

 

             MISA (test code = MISA) Association of                           



                          Glomerular Filtration                           



                          Rate (GFR) and Staging                           



                          of Kidney Disease*                           



                          +---------------------                           



                          --+-------------------                           



                          --+-------------------                           



                          ------+| GFR                           



                          (mL/min/1.73 m2) ?|                           



                          With Kidney Damage ?|                           



                          ?Without Kidney                           



                          Damage+---------------                           



                          --------+-------------                           



                          --------+-------------                           



                          ------------+| ?>90 ?                           



                          ? ? ? ? ? ? ? ?|                           



                          ?Stage one ? ? ? ? ?|                           



                          ? Normal ? ? ? ? ? ? ?                           



                          ?+--------------------                           



                          ---+------------------                           



                          ---+------------------                           



                          -------+| ?60-89 ? ? ?                           



                          ? ? ? ? ?| ?Stage two                           



                          ? ? ? ? ?| ? Decreased                           



                          GFR ? ? ? ?                            



                          +---------------------                           



                          --+-------------------                           



                          --+-------------------                           



                          ------+| ?30-59 ? ? ?                           



                          ? ? ? ? ?| ?Stage                           



                          three ? ? ? ?| ? Stage                           



                          three ? ? ? ? ?                           



                          +---------------------                           



                          --+-------------------                           



                          --+-------------------                           



                          ------+| ?15-29 ? ? ?                           



                          ? ? ? ? ?| ?Stage four                           



                          ? ? ? ? | ? Stage four                           



                          ? ? ? ? ?                              



                          ?+--------------------                           



                          ---+------------------                           



                          ---+------------------                           



                          -------+| ?<15 (or                           



                          dialysis) ? ?| ?Stage                           



                          five ? ? ? ? | ? Stage                           



                          five ? ? ? ? ?                           



                          ?+--------------------                           



                          ---+------------------                           



                          ---+------------------                           



                          -------+ *Each stage                           



                          assumes the associated                           



                          GFR level has been in                           



                          effect for at least                           



                          three months. ?Stages                           



                          1 to 5, with or                           



                          without kidney                           



                          disease, indicate                           



                          chronic kidney                           



                          disease. Notes:                           



                          Determination of                           



                          stages one and two                           



                          (with eGFR                             



                          >59mL/min/1.73 m2)                           



                          requires estimation of                           



                          kidney damage for at                           



                          least three months as                           



                          defined by structural                           



                          or functional                           



                          abnormalities of the                           



                          kidney, manifested by                           



                          either:Pathological                           



                          abnormalities or                           



                          Markers of kidney                           



                          damage (including                           



                          abnormalities in the                           



                          composition of the                           



                          blood or urine or                           



                          abnormalities in                           



                          imaging tests).                           

 

             Lab Interpretation Abnormal                               



             (test code = 60781-5)                                        



[facility] WITH [id number] 12:08:31





             Test Item    Value        Reference Range Interpretation Comments

 

             WBC (test code =              See_Comment  H             [Automated



             4891-2)                                             message] The



                                                                 system which



                                                                 generated this



                                                                 result transmit

rodrick



                                                                 reference range

:



                                                                 4.20 - 10.70



                                                                 10*3/?L. The



                                                                 reference range



                                                                 was not used to



                                                                 interpret this



                                                                 result as



                                                                 normal/abnormal

.

 

             RBC (test code =              See_Comment                [Automated



             859-8)                                              message] The



                                                                 system which



                                                                 generated this



                                                                 result transmit

rodrick



                                                                 reference range

:



                                                                 4.26 - 5.52



                                                                 10*6/?L. The



                                                                 reference range



                                                                 was not used to



                                                                 interpret this



                                                                 result as



                                                                 normal/abnormal

.

 

             HGB (test code = 14.3 g/dL    12.2-16.4                 



             718-7)                                              

 

             HCT (test code = 40.6 %       38.4-49.3                 



             4544-3)                                             

 

             MCV (test code = 83.4 fL      81.7-95.6                 



             787-2)                                              

 

             MCH (test code = 29.4 pg      26.1-32.7                 



             785-6)                                              

 

             MCHC (test code = 35.2 g/dL    31.2-35.0    H            



             786-4)                                              

 

             RDW-SD (test code = 39.8 fL      38.5-51.6                 



             29963-9)                                            

 

             RDW-CV (test code = 13.2 %       12.1-15.4                 



             788-0)                                              

 

             PLT (test code =              See_Comment  H             [Automated



             777-3)                                              message] The



                                                                 system which



                                                                 generated this



                                                                 result transmit

rodrick



                                                                 reference range

:



                                                                 150 - 328 10*3/

?L.



                                                                 The reference



                                                                 range was not u

sed



                                                                 to interpret th

is



                                                                 result as



                                                                 normal/abnormal

.

 

             MPV (test code = 9.9 fL       9.8-13.0                  



             15646-4)                                            

 

             NRBC/100 WBC (test              See_Comment                [Automat

ed



             code = 9992404954)                                        message] 

The



                                                                 system which



                                                                 generated this



                                                                 result transmit

rodrick



                                                                 reference range

:



                                                                 0.0 - 10.0 /100



                                                                 WBCs. The



                                                                 reference range



                                                                 was not used to



                                                                 interpret this



                                                                 result as



                                                                 normal/abnormal

.

 

             NRBC x10^3 (test code <0.01        See_Comment                [Auto

mated



             = 9691832253)                                        message] The



                                                                 system which



                                                                 generated this



                                                                 result transmit

rodrick



                                                                 reference range

:



                                                                 10*3/?L. The



                                                                 reference range



                                                                 was not used to



                                                                 interpret this



                                                                 result as



                                                                 normal/abnormal

.

 

             GRAN MAT (NEUT) % 82.9 %                                 



             (test code = 770-8)                                        

 

             IMM GRAN % (test code 3.60 %                                 



             = [id number])                                        

 

             LYMPH % (test code = 8.3 %                                  



             736-9)                                              

 

             MONO % (test code = 4.4 %                                  



             5905-5)                                             

 

             EOS % (test code = 0.5 %                                  



             713-8)                                              

 

             BASO % (test code = 0.3 %                                  



             706-2)                                              

 

             GRAN MAT x10^3(ANC) 21.94 10*3/uL 1.99-6.95    H            



             (test code =                                        



             2471339760)                                         

 

             IMM GRAN x10^3 (test 0.95 10*3/uL 0.00-0.06    H            



             code = [id number])                                        

 

             LYMPH x10^3 (test code 2.21 10*3/uL 1.09-3.23                 



             = 731-0)                                            

 

             MONO x10^3 (test code 1.16 10*3/uL 0.36-1.02    H            



             = 742-7)                                            

 

             EOS x10^3 (test code = 0.13 10*3/uL 0.06-0.53                 



             711-2)                                              

 

             BASO x10^3 (test code 0.09 10*3/uL 0.01-0.09                 



             = 704-7)                                            

 

             Lab Interpretation Abnormal                               



             (test code = 79845-3)                                        



[facility] WITH [id number] 12:11:01





             Test Item    Value        Reference Range Interpretation Comments

 

             WBC (test code =              See_Comment  H             [Automated



             6690-2)                                             message] The



                                                                 system which



                                                                 generated this



                                                                 result transmit

rodrick



                                                                 reference range

:



                                                                 4.20 - 10.70



                                                                 10*3/?L. The



                                                                 reference range



                                                                 was not used to



                                                                 interpret this



                                                                 result as



                                                                 normal/abnormal

.

 

             RBC (test code =              See_Comment                [Automated



             789-8)                                              message] The



                                                                 system which



                                                                 generated this



                                                                 result transmit

rodrick



                                                                 reference range

:



                                                                 4.26 - 5.52



                                                                 10*6/?L. The



                                                                 reference range



                                                                 was not used to



                                                                 interpret this



                                                                 result as



                                                                 normal/abnormal

.

 

             HGB (test code = 14.4 g/dL    12.2-16.4                 



             718-7)                                              

 

             HCT (test code = 43.5 %       38.4-49.3                 



             4544-3)                                             

 

             MCV (test code = 85.8 fL      81.7-95.6                 



             787-2)                                              

 

             MCH (test code = 28.4 pg      26.1-32.7                 



             785-6)                                              

 

             MCHC (test code = 33.1 g/dL    31.2-35.0                 



             786-4)                                              

 

             RDW-SD (test code = 40.9 fL      38.5-51.6                 



             93225-5)                                            

 

             RDW-CV (test code = 13.2 %       12.1-15.4                 



             788-0)                                              

 

             PLT (test code =              See_Comment                [Automated



             777-3)                                              message] The



                                                                 system which



                                                                 generated this



                                                                 result transmit

rodrick



                                                                 reference range

:



                                                                 150 - 328 10*3/

?L.



                                                                 The reference



                                                                 range was not u

sed



                                                                 to interpret th

is



                                                                 result as



                                                                 normal/abnormal

.

 

             MPV (test code = 10.9 fL      9.8-13.0                  



             90527-5)                                            

 

             NRBC/100 WBC (test              See_Comment                [Automat

ed



             code = [id number])                                        message] 

The



                                                                 system which



                                                                 generated this



                                                                 result transmit

rodrick



                                                                 reference range

:



                                                                 0.0 - 10.0 /100



                                                                 WBCs. The



                                                                 reference range



                                                                 was not used to



                                                                 interpret this



                                                                 result as



                                                                 normal/abnormal

.

 

             NRBC x10^3 (test code <0.01        See_Comment                [Auto

mated



             = [id number])                                        message] The



                                                                 system which



                                                                 generated this



                                                                 result transmit

rodrick



                                                                 reference range

:



                                                                 10*3/?L. The



                                                                 reference range



                                                                 was not used to



                                                                 interpret this



                                                                 result as



                                                                 normal/abnormal

.

 

             GRAN MAT (NEUT) % 80.8 %                                 



             (test code = 770-8)                                        

 

             IMM GRAN % (test code 5.80 %                                 



             = 8641770244)                                        

 

             LYMPH % (test code = 7.4 %                                  



             736-9)                                              

 

             MONO % (test code = 4.9 %                                  



             5905-5)                                             

 

             EOS % (test code = 0.6 %                                  



             713-8)                                              

 

             BASO % (test code = 0.5 %                                  



             706-2)                                              

 

             GRAN MAT x10^3(ANC) 17.18 10*3/uL 1.99-6.95    H            



             (test code =                                        



             4832949236)                                         

 

             IMM GRAN x10^3 (test 1.23 10*3/uL 0.00-0.06    H            



             code = 8545107032)                                        

 

             LYMPH x10^3 (test code 1.57 10*3/uL 1.09-3.23                 



             = 731-0)                                            

 

             MONO x10^3 (test code 1.04 10*3/uL 0.36-1.02    H            



             = 742-7)                                            

 

             EOS x10^3 (test code = 0.13 10*3/uL 0.06-0.53                 



             711-2)                                              

 

             BASO x10^3 (test code 0.10 10*3/uL 0.01-0.09    H            



             = 704-7)                                            

 

             Lab Interpretation Abnormal                               



             (test code = 26833-5)                                        



[facility] METABOLIC PANEL (NA, K, CL, CO2, 
GLUCOSE, BUN, CREATININE, CA)2021 12:01:30





             Test Item    Value        Reference Range Interpretation Comments

 

             NA (test code = 135 mmol/L   135-145                   



             2064585856)                                         

 

             K (test code = 4.3 mmol/L   3.5-5.0                   



             7807092710)                                         

 

             CL (test code = 102 mmol/L                       



             7539937232)                                         

 

             CO2 TOTAL (test code = 27 mmol/L    23-31                     



             5019570773)                                         

 

             AGAP (test code =              2-16                      



             3060159673)                                         

 

             BUN (test code = 21 mg/dL     7-23                      



             6725057833)                                         

 

             GLUCOSE (test code = 99 mg/dL                         



             7708935119)                                         

 

             CREATININE (test code = 0.57 mg/dL   0.60-1.25    L            



             9612022148)                                         

 

             CALCIUM (test code = 8.3 mg/dL    8.6-10.6     L            



             4364845700)                                         

 

             eGFR Calculation              mL/min/1.73m2              



             (Non-)                                        



             (test code =                                        



             6850266454)                                         

 

             eGFR Calculation              mL/min/1.73m2              



             (African American)                                        



             (test code =                                        



             0371368385)                                         

 

             MISA (test code = MISA) Association of                           



                          Glomerular Filtration                           



                          Rate (GFR) and Staging                           



                          of Kidney Disease*                           



                          +---------------------                           



                          --+-------------------                           



                          --+-------------------                           



                          ------+| GFR                           



                          (mL/min/1.73 m2) ?|                           



                          With Kidney Damage ?|                           



                          ?Without Kidney                           



                          Damage+---------------                           



                          --------+-------------                           



                          --------+-------------                           



                          ------------+| ?>90 ?                           



                          ? ? ? ? ? ? ? ?|                           



                          ?Stage one ? ? ? ? ?|                           



                          ? Normal ? ? ? ? ? ? ?                           



                          ?+--------------------                           



                          ---+------------------                           



                          ---+------------------                           



                          -------+| ?60-89 ? ? ?                           



                          ? ? ? ? ?| ?Stage two                           



                          ? ? ? ? ?| ? Decreased                           



                          GFR ? ? ? ?                            



                          +---------------------                           



                          --+-------------------                           



                          --+-------------------                           



                          ------+| ?30-59 ? ? ?                           



                          ? ? ? ? ?| ?Stage                           



                          three ? ? ? ?| ? Stage                           



                          three ? ? ? ? ?                           



                          +---------------------                           



                          --+-------------------                           



                          --+-------------------                           



                          ------+| ?15-29 ? ? ?                           



                          ? ? ? ? ?| ?Stage four                           



                          ? ? ? ? | ? Stage four                           



                          ? ? ? ? ?                              



                          ?+--------------------                           



                          ---+------------------                           



                          ---+------------------                           



                          -------+| ?<15 (or                           



                          dialysis) ? ?| ?Stage                           



                          five ? ? ? ? | ? Stage                           



                          five ? ? ? ? ?                           



                          ?+--------------------                           



                          ---+------------------                           



                          ---+------------------                           



                          -------+ *Each stage                           



                          assumes the associated                           



                          GFR level has been in                           



                          effect for at least                           



                          three months. ?Stages                           



                          1 to 5, with or                           



                          without kidney                           



                          disease, indicate                           



                          chronic kidney                           



                          disease. Notes:                           



                          Determination of                           



                          stages one and two                           



                          (with eGFR                             



                          >59mL/min/1.73 m2)                           



                          requires estimation of                           



                          kidney damage for at                           



                          least three months as                           



                          defined by structural                           



                          or functional                           



                          abnormalities of the                           



                          kidney, manifested by                           



                          either:Pathological                           



                          abnormalities or                           



                          Markers of kidney                           



                          damage (including                           



                          abnormalities in the                           



                          composition of the                           



                          blood or urine or                           



                          abnormalities in                           



                          imaging tests).                           

 

             Lab Interpretation Abnormal                               



             (test code = 38069-9)                                        



[id number]





             Test Item    Value        Reference Range Interpretation Comments

 

             Blood Culture-Aerobic No organisms No growth                 Previo

us



             (test code = 17928-3) isolated                               prelim

inary



                                                                 verified result



                                                                 was Culture In



                                                                 Progress on



                                                                 3/1/2021 at 140

2



                                                                 CSTPrevious



                                                                 preliminary



                                                                 verified result



                                                                 was No growth a

t



                                                                 24 hours on



                                                                 3/2/2021 at 110

1



                                                                 CSTPrevious



                                                                 preliminary



                                                                 verified result



                                                                 was No growth a

t



                                                                 48 hours on



                                                                 3/3/2021 at 110

1



                                                                 CSTPrevious



                                                                 preliminary



                                                                 verified result



                                                                 was No growth a

t



                                                                 72 hours on



                                                                 3/4/2021 at 110

1



                                                                 CST

 

             Blood        No organisms No growth                 Previous



             Culture-Anaerobic isolated                               preliminar

y



             (test code = 65193-4)                                        verifi

ed result



                                                                 was Culture In



                                                                 Progress on



                                                                 3/1/2021 at 140

2



                                                                 CSTPrevious



                                                                 preliminary



                                                                 verified result



                                                                 was No growth a

t



                                                                 24 hours on



                                                                 3/2/2021 at 110

1



                                                                 CSTPrevious



                                                                 preliminary



                                                                 verified result



                                                                 was No growth a

t



                                                                 48 hours on



                                                                 3/3/2021 at 110

1



                                                                 CSTPrevious



                                                                 preliminary



                                                                 verified result



                                                                 was No growth a

t



                                                                 72 hours on



                                                                 3/4/2021 at 110

1



                                                                 CST

 

             Lab Interpretation Normal                                 



             (test code = 26041-6)                                        



[id number]01[facility]00





             Test Item    Value        Reference Range Interpretation Comments

 

             Blood Culture-Aerobic No organisms No growth                 Previo

us



             (test code = 17928-3) isolated                               prelim

inary



                                                                 verified result



                                                                 was Culture In



                                                                 Progress on



                                                                 3/1/2021 at 140

2



                                                                 CSTPrevious



                                                                 preliminary



                                                                 verified result



                                                                 was No growth a

t



                                                                 24 hours on



                                                                 3/2/2021 at 110

1



                                                                 CSTPrevious



                                                                 preliminary



                                                                 verified result



                                                                 was No growth a

t



                                                                 48 hours on



                                                                 3/3/2021 at 110

1



                                                                 CSTPrevious



                                                                 preliminary



                                                                 verified result



                                                                 was No growth a

t



                                                                 72 hours on



                                                                 3/4/2021 at 110

1



                                                                 CST

 

             Blood        No organisms No growth                 Previous



             Culture-Anaerobic isolated                               preliminar

y



             (test code = 60949-6)                                        verifi

ed result



                                                                 was Culture In



                                                                 Progress on



                                                                 3/1/2021 at 140

2



                                                                 CSTPrevious



                                                                 preliminary



                                                                 verified result



                                                                 was No growth a

t



                                                                 24 hours on



                                                                 3/2/2021 at 110

1



                                                                 CSTPrevious



                                                                 preliminary



                                                                 verified result



                                                                 was No growth a

t



                                                                 48 hours on



                                                                 3/3/2021 at 110

1



                                                                 CSTPrevious



                                                                 preliminary



                                                                 verified result



                                                                 was No growth a

t



                                                                 72 hours on



                                                                 3/4/2021 at 110

1



                                                                 CST

 

             Lab Interpretation Normal                                 



             (test code = 75695-3)                                        



[id number]





             Test Item    Value        Reference Range Interpretation Comments

 

             CRP (test code = 0978871960) 18.1 mg/dL   <0.8         H           

 

 

             Lab Interpretation (test code = Abnormal                           

    



             76068-7)                                            



[id number]





             Test Item    Value        Reference Range Interpretation Comments

 

             Procalcitonin (test 0.41 ng/mL   <0.07        H            



             code = 4134685063)                                        

 

             MISA (test code = MISA) INTERPRETATION OF                           



                          PROCALCITONIN RESULTS IN                           



                          ADULTS >= 18 YEARS OF                           



                          AGE Initiation and                           



                          discontinuation of                           



                          antibiotics on patients                           



                          with suspected or                           



                          confirmed Lower                           



                          Respiratory Tract                           



                          Infection in Adults >=                           



                          18 years of age.                           



                          +--------------+--------                           



                          --------+---------------                           



                          +-----------------------                           



                          -----+|Procalcitonin                           



                          |Interpretation                           



                          ?|Antibiotic ? ?                           



                          |Considerations ? ? ? ?                           



                          ? ? ? |ng/mL ? ? ? ? | ?                           



                          ? ? ? ? ? ?                            



                          ?|recommendation | ? ? ?                           



                          ? ? ? ? ? ? ? ? ? ? ?                           



                          +--------------+--------                           



                          --------+---------------                           



                          +-----------------------                           



                          -----+| <0.1 ? ? ? ? |                           



                          Bacterial ? ? ?|                           



                          Strongly ? ? ?| ? ? ? ?                           



                          ? ? ? ? ? ? ? ? ? ? | ?                           



                          ? ? ? ? ? ?| infection                           



                          very | discouraged ? |                           



                          Overruling: ? ? ? ? ? ?                           



                          ? ? | ? ? ? ? ? ? ?|                           



                          unlikely ? ? ? | ? ? ? ?                           



                          ? ? ? | ? Clinically                           



                          unstable ? ? ?                           



                          +--------------+--------                           



                          --------+---------------                           



                          + ? High risk for                           



                          adverse ? ? | <0.25 ? ?                           



                          ? ?| Bacterial ? ? ?|                           



                          Discouraged ? | ?                           



                          outcome ? ? ? ? ? ? ? ?                           



                          ? | ? ? ? ? ? ? ?|                           



                          infection ? ? ?| ? ? ? ?                           



                          ? ? ? | ? SEE IMPORTANT                           



                          NOTE ? ? ? ?| ? ? ? ? ?                           



                          ? ?| unlikely ? ? ? | ?                           



                          ? ? ? ? ? ? | ? ? ? ? ?                           



                          ? ? ? ? ? ? ? ? ?                           



                          +--------------+--------                           



                          --------+---------------                           



                          +-----------------------                           



                          -----+| >=0.25 ? ? ? |                           



                          Bacterial ? ? ?|                           



                          Encouraged ? ?| ? ? ? ?                           



                          ? ? ? ? ? ? ? ? ? ? | ?                           



                          ? ? ? ? ? ?| infection ?                           



                          ? ?| ? ? ? ? ? ? ? | ? ?                           



                          ? ? ? ? ? ? ? ? ? ? ? ?                           



                          | ? ? ? ? ? ? ?| likely                           



                          ? ? ? ? | ? ? ? ? ? ? ?                           



                          | Consider treatment                           



                          failure                                



                          ?+--------------+-------                           



                          ---------+--------------                           



                          -+ if levels does not                           



                          decrease | >0.5 ? ? ? ?                           



                          | Bacterial ? ? ?|                           



                          Strongly ? ? ?|                           



                          appropriately ? ? ? ? ?                           



                          ? ? | ? ? ? ? ? ? ?|                           



                          infection very |                           



                          encouraged ? ?| ? ? ? ?                           



                          ? ? ? ? ? ? ? ? ? ? | ?                           



                          ? ? ? ? ? ?| likely ? ?                           



                          ? ? | ? ? ? ? ? ? ? | ?                           



                          ? ? ? ? ? ? ? ? ? ? ? ?                           



                          ?                                      



                          +--------------+--------                           



                          --------+---------------                           



                          +-----------------------                           



                          -----+ Discontinuation                           



                          of antibiotics in                           



                          high-acuity patients                           



                          with suspected or                           



                          confirmed sepsis in                           



                          Adults >= 18 years of                           



                          age.                                   



                          +--------------+--------                           



                          --------+---------------                           



                          +-----------------------                           



                          -----+|Procalcitonin                           



                          |Interpretation                           



                          ?|Antibiotic ? ?                           



                          |Considerations ? ? ? ?                           



                          ? ? ? |ng/mL ? ? ? ? | ?                           



                          ? ? ? ? ? ?                            



                          ?|recommendation | ? ? ?                           



                          ? ? ? ? ? ? ? ? ? ? ?                           



                          +--------------+--------                           



                          --------+---------------                           



                          +-----------------------                           



                          -----+| <0.25 ? ? ? ?|                           



                          Bacterial ? ? ?|                           



                          Strongly ? ? ?| ? ? ? ?                           



                          ? ? ? ? ? ? ? ? ? ? | ?                           



                          ? ? ? ? ? ?| infection                           



                          very | discouraged ? |                           



                          Overruling: ? ? ? ? ? ?                           



                          ? ? | ? ? ? ? ? ? ?|                           



                          unlikely ? ? ? | ? ? ? ?                           



                          ? ? ? | ? Clinically                           



                          unstable ? ? ?                           



                          +--------------+--------                           



                          --------+---------------                           



                          + ? High risk for                           



                          adverse ? ? | <0.5 or                           



                          drop | Bacterial ? ? ?|                           



                          Discouraged ? | ?                           



                          outcome ? ? ? ? ? ? ? ?                           



                          ? | >80% from ? ?|                           



                          infection ? ? ?| ? ? ? ?                           



                          ? ? ? | ? SEE IMPORTANT                           



                          NOTE ? ? ? ?| highest                           



                          PCT ?| unlikely ? ? ? |                           



                          ? ? ? ? ? ? ? | ? ? ? ?                           



                          ? ? ? ? ? ? ? ? ? ? |                           



                          level ? ? ? ?| ? ? ? ? ?                           



                          ? ? ?| ? ? ? ? ? ? ? | ?                           



                          ? ? ? ? ? ? ? ? ? ? ? ?                           



                          ?                                      



                          +--------------+--------                           



                          --------+---------------                           



                          +-----------------------                           



                          -----+| >=0.5 ? ? ? ?|                           



                          Bacterial ? ? ?|                           



                          Encouraged ? ?| ? ? ? ?                           



                          ? ? ? ? ? ? ? ? ? ? | ?                           



                          ? ? ? ? ? ?| infection ?                           



                          ? ?| ? ? ? ? ? ? ? | ? ?                           



                          ? ? ? ? ? ? ? ? ? ? ? ?                           



                          | ? ? ? ? ? ? ?| likely                           



                          ? ? ? ? | ? ? ? ? ? ? ?                           



                          | Consider treatment                           



                          failure                                



                          ?+--------------+-------                           



                          ---------+--------------                           



                          -+ if levels does not                           



                          decrease | >1.0 ? ? ? ?                           



                          | Bacterial ? ? ?|                           



                          Strongly ? ? ?|                           



                          appropriately ? ? ? ? ?                           



                          ? ? | ? ? ? ? ? ? ?|                           



                          infection very |                           



                          encouraged ? ?| ? ? ? ?                           



                          ? ? ? ? ? ? ? ? ? ? | ?                           



                          ? ? ? ? ? ?| likely ? ?                           



                          ? ? | ? ? ? ? ? ? ? | ?                           



                          ? ? ? ? ? ? ? ? ? ? ? ?                           



                          ?                                      



                          +--------------+--------                           



                          --------+---------------                           



                          +-----------------------                           



                          -----+ Percentage of                           



                          drop of Procalcitonin                           



                          calculation for                           



                          Discontinuation of                           



                          antibiotics in                           



                          high-acuity patients                           



                          with suspected or                           



                          confirmed sepsis in                           



                          Adults >= 18 years of                           



                          age.  ? ? ? ? ? ? ? ? ?                           



                          ? ? Procalcitonin                           



                          highest{}-Procalcitonin                           



                          current{}Delta                           



                          Procalcitonin =                           



                          ________________________                           



                          _______________________                           



                          x100%  ? ? ? ? ? ? ? ? ?                           



                          ? ? ? ? ? ? ?                           



                          Procalcitonin current {}                           



                          IMPORTANT NOTE:                           



                          Procalcitonin may be                           



                          elevated without                           



                          bacterial infection by                           



                          physiologic stress                           



                          related to trauma,                           



                          burns, chronic dialysis,                           



                          metastatic cancer,                           



                          surgery in the past                           



                          seven days, malaria,                           



                          some fungal infections,                           



                          and some forms of                           



                          vasculitis. The                           



                          interpretation algorithm                           



                          may not apply to                           



                          patients with                           



                          immunosuppression                           



                          (equivalent of >10 mg of                           



                          prednisone daily), HIV                           



                          with CD4 cell count <                           



                          350 cells/mm3, active                           



                          malignancy on systemic                           



                          chemotherapy, solid                           



                          organ transplant or                           



                          hematopoietic stem cell                           



                          transplantation, or                           



                          hospital acquired                           



                          pneumonia. Additionally,                           



                          some clinical trials of                           



                          procalcitonin have                           



                          excluded patients with                           



                          shock requiring                           



                          vasopressor use, acute                           



                          respiratory failure                           



                          requiring mechanical                           



                          ventilation, or those                           



                          with known lung                           



                          abscess/empyema. For                           



                          further information                           



                          please refer                           



                          to:[email or web address]                           



                          [email or web address]                           

 

             Lab Interpretation Abnormal                               



             (test code = 74120-5)                                        



[facility] WITH [id number] 14:42:00





             Test Item    Value        Reference Range Interpretation Comments

 

             WBC (test code =              See_Comment  H             [Automated



             8118-2)                                             message] The



                                                                 system which



                                                                 generated this



                                                                 result transmit

rodrick



                                                                 reference range

:



                                                                 4.20 - 10.70



                                                                 10*3/?L. The



                                                                 reference range



                                                                 was not used to



                                                                 interpret this



                                                                 result as



                                                                 normal/abnormal

.

 

             RBC (test code =              See_Comment                [Automated



             789-8)                                              message] The



                                                                 system which



                                                                 generated this



                                                                 result transmit

rodrick



                                                                 reference range

:



                                                                 4.26 - 5.52



                                                                 10*6/?L. The



                                                                 reference range



                                                                 was not used to



                                                                 interpret this



                                                                 result as



                                                                 normal/abnormal

.

 

             HGB (test code = 15.5 g/dL    12.2-16.4                 



             718-7)                                              

 

             HCT (test code = 44.5 %       38.4-49.3                 



             4544-3)                                             

 

             MCV (test code = 83.2 fL      81.7-95.6                 



             787-2)                                              

 

             MCH (test code = 29.0 pg      26.1-32.7                 



             785-6)                                              

 

             MCHC (test code = 34.8 g/dL    31.2-35.0                 



             786-4)                                              

 

             RDW-SD (test code = 41.8 fL      38.5-51.6                 



             13799-1)                                            

 

             RDW-CV (test code = 13.8 %       12.1-15.4                 



             788-0)                                              

 

             PLT (test code =              See_Comment                [Automated



             777-3)                                              message] The



                                                                 system which



                                                                 generated this



                                                                 result transmit

rodrick



                                                                 reference range

:



                                                                 150 - 328 10*3/

?L.



                                                                 The reference



                                                                 range was not u

sed



                                                                 to interpret th

is



                                                                 result as



                                                                 normal/abnormal

.

 

             MPV (test code = 9.8 fL       9.8-13.0                  



             82533-1)                                            

 

             NRBC/100 WBC (test              See_Comment                [Automat

ed



             code = 7176812162)                                        message] 

The



                                                                 system which



                                                                 generated this



                                                                 result transmit

rodrick



                                                                 reference range

:



                                                                 0.0 - 10.0 /100



                                                                 WBCs. The



                                                                 reference range



                                                                 was not used to



                                                                 interpret this



                                                                 result as



                                                                 normal/abnormal

.

 

             NRBC x10^3 (test code <0.01        See_Comment                [Auto

mated



             = [id number])                                        message] The



                                                                 system which



                                                                 generated this



                                                                 result transmit

rodrick



                                                                 reference range

:



                                                                 10*3/?L. The



                                                                 reference range



                                                                 was not used to



                                                                 interpret this



                                                                 result as



                                                                 normal/abnormal

.

 

             GRAN MAT (NEUT) % 68.9 %                                 



             (test code = 770-8)                                        

 

             IMM GRAN % (test code 2.40 %                                 



             = 2550547577)                                        

 

             LYMPH % (test code = 8.1 %                                  



             736-9)                                              

 

             MONO % (test code = 11.7 %                                 



             5905-5)                                             

 

             EOS % (test code = 8.3 %                                  



             713-8)                                              

 

             BASO % (test code = 0.6 %                                  



             706-2)                                              

 

             GRAN MAT x10^3(ANC) 10.53 10*3/uL 1.99-6.95    H            



             (test code =                                        



             2923620824)                                         

 

             IMM GRAN x10^3 (test 0.36 10*3/uL 0.00-0.06    H            



             code = 3695098689)                                        

 

             LYMPH x10^3 (test code 1.23 10*3/uL 1.09-3.23                 



             = 731-0)                                            

 

             MONO x10^3 (test code 1.79 10*3/uL 0.36-1.02    H            



             = 742-7)                                            

 

             EOS x10^3 (test code = 1.26 10*3/uL 0.06-0.53    H            



             711-2)                                              

 

             BASO x10^3 (test code 0.09 10*3/uL 0.01-0.09                 



             = 704-7)                                            

 

             BANDS (test code = Increased                 A            



             7918707676)                                         

 

             Lab Interpretation Abnormal                               



             (test code = 63905-7)                                        



[facility] 
[facility]





             Test Item    Value        Reference Range Interpretation Comments

 

             NA (test code = 134 mmol/L   135-145      L            



             2774929960)                                         

 

             K (test code = 4.2 mmol/L   3.5-5.0                   



             2635698531)                                         

 

             CL (test code = 100 mmol/L                       



             0990806341)                                         

 

             CO2 TOTAL (test code = 28 mmol/L    23-31                     



             2612211736)                                         

 

             AGAP (test code =              2-16                      



             2961066902)                                         

 

             BUN (test code = 22 mg/dL     7-23                      



             4931234435)                                         

 

             GLUCOSE (test code = 100 mg/dL                        



             8154416206)                                         

 

             CREATININE (test code = 0.61 mg/dL   0.60-1.25                 



             6904032913)                                         

 

             TOTAL BILI (test code = 0.7 mg/dL    0.1-1.1                   



             8722280470)                                         

 

             CALCIUM (test code = 8.4 mg/dL    8.6-10.6     L            



             4967092242)                                         

 

             T PROTEIN (test code = 5.6 g/dL     6.3-8.2      L            



             2182002264)                                         

 

             ALBUMIN (test code = 3.0 g/dL     3.5-5.0      L            



             6177410281)                                         

 

             ALK PHOS (test code = 87 U/L                           



             6479977537)                                         

 

             ALTv (test code = 60 U/L       5-50         H            



             2-6)                                             

 

             AST(SGOT) (test code = 38 U/L       13-40                     



             7150366314)                                         

 

             eGFR Calculation              mL/min/1.73m2              



             (Non-)                                        



             (test code =                                        



             9063951851)                                         

 

             eGFR Calculation              mL/min/1.73m2              



             (African American)                                        



             (test code =                                        



             1640556423)                                         

 

             MISA (test code = MISA) Association of                           



                          Glomerular Filtration                           



                          Rate (GFR) and Staging                           



                          of Kidney Disease*                           



                          +---------------------                           



                          --+-------------------                           



                          --+-------------------                           



                          ------+| GFR                           



                          (mL/min/1.73 m2) ?|                           



                          With Kidney Damage ?|                           



                          ?Without Kidney                           



                          Damage+---------------                           



                          --------+-------------                           



                          --------+-------------                           



                          ------------+| ?>90 ?                           



                          ? ? ? ? ? ? ? ?|                           



                          ?Stage one ? ? ? ? ?|                           



                          ? Normal ? ? ? ? ? ? ?                           



                          ?+--------------------                           



                          ---+------------------                           



                          ---+------------------                           



                          -------+| ?60-89 ? ? ?                           



                          ? ? ? ? ?| ?Stage two                           



                          ? ? ? ? ?| ? Decreased                           



                          GFR ? ? ? ?                            



                          +---------------------                           



                          --+-------------------                           



                          --+-------------------                           



                          ------+| ?30-59 ? ? ?                           



                          ? ? ? ? ?| ?Stage                           



                          three ? ? ? ?| ? Stage                           



                          three ? ? ? ? ?                           



                          +---------------------                           



                          --+-------------------                           



                          --+-------------------                           



                          ------+| ?15-29 ? ? ?                           



                          ? ? ? ? ?| ?Stage four                           



                          ? ? ? ? | ? Stage four                           



                          ? ? ? ? ?                              



                          ?+--------------------                           



                          ---+------------------                           



                          ---+------------------                           



                          -------+| ?<15 (or                           



                          dialysis) ? ?| ?Stage                           



                          five ? ? ? ? | ? Stage                           



                          five ? ? ? ? ?                           



                          ?+--------------------                           



                          ---+------------------                           



                          ---+------------------                           



                          -------+ *Each stage                           



                          assumes the associated                           



                          GFR level has been in                           



                          effect for at least                           



                          three months. ?Stages                           



                          1 to 5, with or                           



                          without kidney                           



                          disease, indicate                           



                          chronic kidney                           



                          disease. Notes:                           



                          Determination of                           



                          stages one and two                           



                          (with eGFR                             



                          >59mL/min/1.73 m2)                           



                          requires estimation of                           



                          kidney damage for at                           



                          least three months as                           



                          defined by structural                           



                          or functional                           



                          abnormalities of the                           



                          kidney, manifested by                           



                          either:Pathological                           



                          abnormalities or                           



                          Markers of kidney                           



                          damage (including                           



                          abnormalities in the                           



                          composition of the                           



                          blood or urine or                           



                          abnormalities in                           



                          imaging tests).                           

 

             Lab Interpretation Abnormal                               



             (test code = 59070-7)                                        



[facility]





             Test Item    Value        Reference Range Interpretation Comments

 

             POCT GLU (test code = 1359704480) 98 mg/dL                   

      

 

             Lab Interpretation (test code = Normal                             

    



             42210-1)                                            



[id number]03:00





             Test Item    Value        Reference Range Interpretation Comments

 

             TSH (test code =              See_Comment               Biotin has 

been



             8690983738)                                         reported to cau

se a



                                                                 negative bias,



                                                                 interpret resul

ts



                                                                 relative to pat

shlomo's



                                                                 use of biotin.



                                                                 [Automated mess

age]



                                                                 The system whic

h



                                                                 generated this 

result



                                                                 transmitted ref

erence



                                                                 range: 0.45 - 4

.70



                                                                 mIU/L. The refe

rence



                                                                 range was not u

sed to



                                                                 interpret this 

result



                                                                 as normal/abnor

mal.

 

             Lab Interpretation (test Normal                                 



             code = 72659-8)                                        



[id number] 19:44:00





             Test Item    Value        Reference Range Interpretation Comments

 

             CRP (test code = 5145195399) 35.9 mg/dL   <0.8         H           

 

 

             Lab Interpretation (test code = Abnormal                           

    



             99696-8)                                            



[id number] 13:01:00





             Test Item    Value        Reference Range Interpretation Comments

 

             WBC (test code =              See_Comment  H             [Automated



             6390-2)                                             message] The



                                                                 system which



                                                                 generated this



                                                                 result transmit

rodrick



                                                                 reference range

:



                                                                 4.20 - 10.70



                                                                 10*3/?L. The



                                                                 reference range



                                                                 was not used to



                                                                 interpret this



                                                                 result as



                                                                 normal/abnormal

.

 

             RBC (test code =              See_Comment                [Automated



             109-8)                                              message] The



                                                                 system which



                                                                 generated this



                                                                 result transmit

rodrick



                                                                 reference range

:



                                                                 4.26 - 5.52



                                                                 10*6/?L. The



                                                                 reference range



                                                                 was not used to



                                                                 interpret this



                                                                 result as



                                                                 normal/abnormal

.

 

             HGB (test code = 14.7 g/dL    12.2-16.4                 



             718-7)                                              

 

             HCT (test code = 42.9 %       38.4-49.3                 



             4544-3)                                             

 

             MCV (test code = 84.0 fL      81.7-95.6                 



             787-2)                                              

 

             MCH (test code = 28.8 pg      26.1-32.7                 



             785-6)                                              

 

             MCHC (test code = 34.3 g/dL    31.2-35.0                 



             786-4)                                              

 

             RDW-SD (test code = 42.5 fL      38.5-51.6                 



             64455-9)                                            

 

             RDW-CV (test code = 13.7 %       12.1-15.4                 



             788-0)                                              

 

             PLT (test code =              See_Comment                [Automated



             777-3)                                              message] The



                                                                 system which



                                                                 generated this



                                                                 result transmit

rodrick



                                                                 reference range

:



                                                                 150 - 328 10*3/

?L.



                                                                 The reference



                                                                 range was not u

sed



                                                                 to interpret th

is



                                                                 result as



                                                                 normal/abnormal

.

 

             MPV (test code = 10.4 fL      9.8-13.0                  



             83622-5)                                            

 

             NRBC/100 WBC (test              See_Comment                [Automat

ed



             code = 9150262507)                                        message] 

The



                                                                 system which



                                                                 generated this



                                                                 result transmit

rodrick



                                                                 reference range

:



                                                                 0.0 - 10.0 /100



                                                                 WBCs. The



                                                                 reference range



                                                                 was not used to



                                                                 interpret this



                                                                 result as



                                                                 normal/abnormal

.

 

             NRBC x10^3 (test code <0.01        See_Comment                [Auto

mated



             = 5963092947)                                        message] The



                                                                 system which



                                                                 generated this



                                                                 result transmit

rodrick



                                                                 reference range

:



                                                                 10*3/?L. The



                                                                 reference range



                                                                 was not used to



                                                                 interpret this



                                                                 result as



                                                                 normal/abnormal

.

 

             GRAN MAT (NEUT) % 85.9 %                                 



             (test code = 770-8)                                        

 

             IMM GRAN % (test code 1.20 %                                 



             = [id number])                                        

 

             LYMPH % (test code = 4.4 %                                  



             736-9)                                              

 

             MONO % (test code = 7.9 %                                  



             5905-5)                                             

 

             EOS % (test code = 0.3 %                                  



             713-8)                                              

 

             BASO % (test code = 0.3 %                                  



             706-2)                                              

 

             GRAN MAT x10^3(ANC) 15.92 10*3/uL 1.99-6.95    H            



             (test code =                                        



             7537886943)                                         

 

             IMM GRAN x10^3 (test 0.23 10*3/uL 0.00-0.06    H            



             code = 4475845430)                                        

 

             LYMPH x10^3 (test code 0.81 10*3/uL 1.09-3.23    L            



             = 731-0)                                            

 

             MONO x10^3 (test code 1.46 10*3/uL 0.36-1.02    H            



             = 742-7)                                            

 

             EOS x10^3 (test code = 0.05 10*3/uL 0.06-0.53    L            



             711-2)                                              

 

             BASO x10^3 (test code 0.05 10*3/uL 0.01-0.09                 



             = 704-7)                                            

 

             Lab Interpretation Abnormal                               



             (test code = 17813-2)                                        



[facility] Metabolic Panel (NA, K, CL, CO2, 
GLUCOSE, BUN, CREATININE, CA)2021 11:24:00





             Test Item    Value        Reference Range Interpretation Comments

 

             NA (test code = 133 mmol/L   135-145      L            



             2024170752)                                         

 

             K (test code = 4.2 mmol/L   3.5-5.0                   



             8322315269)                                         

 

             CL (test code = 99 mmol/L                        



             1515071324)                                         

 

             CO2 TOTAL (test code = 26 mmol/L    23-31                     



             2408109598)                                         

 

             AGAP (test code =              2-16                      



             5121479610)                                         

 

             BUN (test code = 22 mg/dL     7-23                      



             4719132649)                                         

 

             GLUCOSE (test code = 92 mg/dL                         



             2539936483)                                         

 

             CREATININE (test code = 1.04 mg/dL   0.60-1.25                 



             3186097727)                                         

 

             CALCIUM (test code = 8.4 mg/dL    8.6-10.6     L            



             5094096056)                                         

 

             eGFR Calculation              mL/min/1.73m2              



             (Non-)                                        



             (test code =                                        



             2378662905)                                         

 

             eGFR Calculation              mL/min/1.73m2              



             (African American)                                        



             (test code =                                        



             1555664831)                                         

 

             MISA (test code = MISA) Association of                           



                          Glomerular Filtration                           



                          Rate (GFR) and Staging                           



                          of Kidney Disease*                           



                          +---------------------                           



                          --+-------------------                           



                          --+-------------------                           



                          ------+| GFR                           



                          (mL/min/1.73 m2) ?|                           



                          With Kidney Damage ?|                           



                          ?Without Kidney                           



                          Damage+---------------                           



                          --------+-------------                           



                          --------+-------------                           



                          ------------+| ?>90 ?                           



                          ? ? ? ? ? ? ? ?|                           



                          ?Stage one ? ? ? ? ?|                           



                          ? Normal ? ? ? ? ? ? ?                           



                          ?+--------------------                           



                          ---+------------------                           



                          ---+------------------                           



                          -------+| ?60-89 ? ? ?                           



                          ? ? ? ? ?| ?Stage two                           



                          ? ? ? ? ?| ? Decreased                           



                          GFR ? ? ? ?                            



                          +---------------------                           



                          --+-------------------                           



                          --+-------------------                           



                          ------+| ?30-59 ? ? ?                           



                          ? ? ? ? ?| ?Stage                           



                          three ? ? ? ?| ? Stage                           



                          three ? ? ? ? ?                           



                          +---------------------                           



                          --+-------------------                           



                          --+-------------------                           



                          ------+| ?15-29 ? ? ?                           



                          ? ? ? ? ?| ?Stage four                           



                          ? ? ? ? | ? Stage four                           



                          ? ? ? ? ?                              



                          ?+--------------------                           



                          ---+------------------                           



                          ---+------------------                           



                          -------+| ?<15 (or                           



                          dialysis) ? ?| ?Stage                           



                          five ? ? ? ? | ? Stage                           



                          five ? ? ? ? ?                           



                          ?+--------------------                           



                          ---+------------------                           



                          ---+------------------                           



                          -------+ *Each stage                           



                          assumes the associated                           



                          GFR level has been in                           



                          effect for at least                           



                          three months. ?Stages                           



                          1 to 5, with or                           



                          without kidney                           



                          disease, indicate                           



                          chronic kidney                           



                          disease. Notes:                           



                          Determination of                           



                          stages one and two                           



                          (with eGFR                             



                          >59mL/min/1.73 m2)                           



                          requires estimation of                           



                          kidney damage for at                           



                          least three months as                           



                          defined by structural                           



                          or functional                           



                          abnormalities of the                           



                          kidney, manifested by                           



                          either:Pathological                           



                          abnormalities or                           



                          Markers of kidney                           



                          damage (including                           



                          abnormalities in the                           



                          composition of the                           



                          blood or urine or                           



                          abnormalities in                           



                          imaging tests).                           

 

             Lab Interpretation Abnormal                               



             (test code = 13110-9)                                        



[id number] 
08:59:00





             Test Item    Value        Reference Range Interpretation Comments

 

             Legionella Urinary Negative     Negative                  



             Antigen (test code =                                        



             3331121059)                                         

 

             MISA (test code = MISA) Negative for L.                           



                          pneumophilia                           



                          serogroup I antigen                           



                          in urine suggesting                           



                          no recent or current                           



                          infection. Infection                           



                          due to Legionella                           



                          cannot be ruled out                           



                          since other                            



                          serogroups and                           



                          species may cause                           



                          disease. Furthermore,                           



                          antigens may not be                           



                          present in urine                           



                          during early stage of                           



                          infection, or the                           



                          level of antigen                           



                          present in urine may                           



                          be below the                           



                          detection limit of                           



                          the test.                              

 

             Lab Interpretation (test Normal                                 



             code = 32134-4)                                        



[id number]56[facility]





             Test Item    Value        Reference Range Interpretation Comments

 

             S. pneumoniae antigen (test code = Negative     Negative           

       



             5082435392)                                         

 

             Lab Interpretation (test code = Normal                             

    



             51271-1)                                            



[facility]





             Test Item    Value        Reference Range Interpretation Comments

 

             APPEARANCE (test code Slightly Cloudy Clear        A            



             = 6563943367)                                        

 

             COLOR (test code = Yellow       Yellow                    



             9198337080)                                         

 

             PH (test code =              4.8-8.0                   



             4130020755)                                         

 

             SP GRAVITY (test code              1.003-1.030               



             = 7289865834)                                        

 

             GLU U QUAL (test code 100 mg/dL    Negative     A            



             = 4615142386)                                        

 

             BLOOD (test code = Small        Negative     A            



             6985095486)                                         

 

             KETONES (test code = Negative     Negative                  



             9108619471)                                         

 

             PROTEIN (test code = 100 mg/dL    Negative     A            



             2887-8)                                             

 

             UROBILIN (test code = 1.0 mg/dL    See_Comment                [Auto

mated



             4769285200)                                         message] The



                                                                 system which



                                                                 generated this



                                                                 result transmit

rodrick



                                                                 reference range

:



                                                                 0-1.0 mg/dL. Th

e



                                                                 reference range



                                                                 was not used to



                                                                 interpret this



                                                                 result as



                                                                 normal/abnormal

.

 

             BILIRUBIN (test code Negative     Negative                  



             = 8799787870)                                        

 

             NITRITE (test code = Negative     Negative                  



             6980498561)                                         

 

             LEUK CLAYTON (test code Negative     Negative                  



             = 8595350856)                                        

 

             RBC/HPF (test code =              See_Comment  H             [Autom

ated



             6334968659)                                         message] The



                                                                 system which



                                                                 generated this



                                                                 result transmit

rodrick



                                                                 reference range

: 0



                                                                 - 3 HPF. The



                                                                 reference range



                                                                 was not used to



                                                                 interpret this



                                                                 result as



                                                                 normal/abnormal

.

 

             WBC/HPF (test code =              See_Comment                [Autom

ated



             2738137230)                                         message] The



                                                                 system which



                                                                 generated this



                                                                 result transmit

rodrick



                                                                 reference range

: 0



                                                                 - 5 HPF. The



                                                                 reference range



                                                                 was not used to



                                                                 interpret this



                                                                 result as



                                                                 normal/abnormal

.

 

             BACTERIA (test code = Few          Negative     A            



             1839456109)                                         

 

             MUCOUS (test code = Marked       Negative LPF A            



             7629632965)                                         

 

             SQ EPITH (test code =              HPF                       



             6161297869)                                         

 

             GRAN CASTS (test code              See_Comment  H             [Auto

mated



             = 9029818681)                                        message] The



                                                                 system which



                                                                 generated this



                                                                 result transmit

rodrick



                                                                 reference range

:



                                                                 <=1 LPF. The



                                                                 reference range



                                                                 was not used to



                                                                 interpret this



                                                                 result as



                                                                 normal/abnormal

.

 

             Lab Interpretation Abnormal                               



             (test code = 98532-1)                                        



[facility]





             Test Item    Value        Reference Range Interpretation Comments

 

             Procalcitonin (test 1.37 ng/mL   <0.07        H            



             code = 8215622525)                                        

 

             MISA (test code = MISA) INTERPRETATION OF                           



                          PROCALCITONIN RESULTS IN                           



                          ADULTS >= 18 YEARS OF                           



                          AGE Initiation and                           



                          discontinuation of                           



                          antibiotics on patients                           



                          with suspected or                           



                          confirmed Lower                           



                          Respiratory Tract                           



                          Infection in Adults >=                           



                          18 years of age.                           



                          +--------------+--------                           



                          --------+---------------                           



                          +-----------------------                           



                          -----+|Procalcitonin                           



                          |Interpretation                           



                          ?|Antibiotic ? ?                           



                          |Considerations ? ? ? ?                           



                          ? ? ? |ng/mL ? ? ? ? | ?                           



                          ? ? ? ? ? ?                            



                          ?|recommendation | ? ? ?                           



                          ? ? ? ? ? ? ? ? ? ? ?                           



                          +--------------+--------                           



                          --------+---------------                           



                          +-----------------------                           



                          -----+| <0.1 ? ? ? ? |                           



                          Bacterial ? ? ?|                           



                          Strongly ? ? ?| ? ? ? ?                           



                          ? ? ? ? ? ? ? ? ? ? | ?                           



                          ? ? ? ? ? ?| infection                           



                          very | discouraged ? |                           



                          Overruling: ? ? ? ? ? ?                           



                          ? ? | ? ? ? ? ? ? ?|                           



                          unlikely ? ? ? | ? ? ? ?                           



                          ? ? ? | ? Clinically                           



                          unstable ? ? ?                           



                          +--------------+--------                           



                          --------+---------------                           



                          + ? High risk for                           



                          adverse ? ? | <0.25 ? ?                           



                          ? ?| Bacterial ? ? ?|                           



                          Discouraged ? | ?                           



                          outcome ? ? ? ? ? ? ? ?                           



                          ? | ? ? ? ? ? ? ?|                           



                          infection ? ? ?| ? ? ? ?                           



                          ? ? ? | ? SEE IMPORTANT                           



                          NOTE ? ? ? ?| ? ? ? ? ?                           



                          ? ?| unlikely ? ? ? | ?                           



                          ? ? ? ? ? ? | ? ? ? ? ?                           



                          ? ? ? ? ? ? ? ? ?                           



                          +--------------+--------                           



                          --------+---------------                           



                          +-----------------------                           



                          -----+| >=0.25 ? ? ? |                           



                          Bacterial ? ? ?|                           



                          Encouraged ? ?| ? ? ? ?                           



                          ? ? ? ? ? ? ? ? ? ? | ?                           



                          ? ? ? ? ? ?| infection ?                           



                          ? ?| ? ? ? ? ? ? ? | ? ?                           



                          ? ? ? ? ? ? ? ? ? ? ? ?                           



                          | ? ? ? ? ? ? ?| likely                           



                          ? ? ? ? | ? ? ? ? ? ? ?                           



                          | Consider treatment                           



                          failure                                



                          ?+--------------+-------                           



                          ---------+--------------                           



                          -+ if levels does not                           



                          decrease | >0.5 ? ? ? ?                           



                          | Bacterial ? ? ?|                           



                          Strongly ? ? ?|                           



                          appropriately ? ? ? ? ?                           



                          ? ? | ? ? ? ? ? ? ?|                           



                          infection very |                           



                          encouraged ? ?| ? ? ? ?                           



                          ? ? ? ? ? ? ? ? ? ? | ?                           



                          ? ? ? ? ? ?| likely ? ?                           



                          ? ? | ? ? ? ? ? ? ? | ?                           



                          ? ? ? ? ? ? ? ? ? ? ? ?                           



                          ?                                      



                          +--------------+--------                           



                          --------+---------------                           



                          +-----------------------                           



                          -----+ Discontinuation                           



                          of antibiotics in                           



                          high-acuity patients                           



                          with suspected or                           



                          confirmed sepsis in                           



                          Adults >= 18 years of                           



                          age.                                   



                          +--------------+--------                           



                          --------+---------------                           



                          +-----------------------                           



                          -----+|Procalcitonin                           



                          |Interpretation                           



                          ?|Antibiotic ? ?                           



                          |Considerations ? ? ? ?                           



                          ? ? ? |ng/mL ? ? ? ? | ?                           



                          ? ? ? ? ? ?                            



                          ?|recommendation | ? ? ?                           



                          ? ? ? ? ? ? ? ? ? ? ?                           



                          +--------------+--------                           



                          --------+---------------                           



                          +-----------------------                           



                          -----+| <0.25 ? ? ? ?|                           



                          Bacterial ? ? ?|                           



                          Strongly ? ? ?| ? ? ? ?                           



                          ? ? ? ? ? ? ? ? ? ? | ?                           



                          ? ? ? ? ? ?| infection                           



                          very | discouraged ? |                           



                          Overruling: ? ? ? ? ? ?                           



                          ? ? | ? ? ? ? ? ? ?|                           



                          unlikely ? ? ? | ? ? ? ?                           



                          ? ? ? | ? Clinically                           



                          unstable ? ? ?                           



                          +--------------+--------                           



                          --------+---------------                           



                          + ? High risk for                           



                          adverse ? ? | <0.5 or                           



                          drop | Bacterial ? ? ?|                           



                          Discouraged ? | ?                           



                          outcome ? ? ? ? ? ? ? ?                           



                          ? | >80% from ? ?|                           



                          infection ? ? ?| ? ? ? ?                           



                          ? ? ? | ? SEE IMPORTANT                           



                          NOTE ? ? ? ?| highest                           



                          PCT ?| unlikely ? ? ? |                           



                          ? ? ? ? ? ? ? | ? ? ? ?                           



                          ? ? ? ? ? ? ? ? ? ? |                           



                          level ? ? ? ?| ? ? ? ? ?                           



                          ? ? ?| ? ? ? ? ? ? ? | ?                           



                          ? ? ? ? ? ? ? ? ? ? ? ?                           



                          ?                                      



                          +--------------+--------                           



                          --------+---------------                           



                          +-----------------------                           



                          -----+| >=0.5 ? ? ? ?|                           



                          Bacterial ? ? ?|                           



                          Encouraged ? ?| ? ? ? ?                           



                          ? ? ? ? ? ? ? ? ? ? | ?                           



                          ? ? ? ? ? ?| infection ?                           



                          ? ?| ? ? ? ? ? ? ? | ? ?                           



                          ? ? ? ? ? ? ? ? ? ? ? ?                           



                          | ? ? ? ? ? ? ?| likely                           



                          ? ? ? ? | ? ? ? ? ? ? ?                           



                          | Consider treatment                           



                          failure                                



                          ?+--------------+-------                           



                          ---------+--------------                           



                          -+ if levels does not                           



                          decrease | >1.0 ? ? ? ?                           



                          | Bacterial ? ? ?|                           



                          Strongly ? ? ?|                           



                          appropriately ? ? ? ? ?                           



                          ? ? | ? ? ? ? ? ? ?|                           



                          infection very |                           



                          encouraged ? ?| ? ? ? ?                           



                          ? ? ? ? ? ? ? ? ? ? | ?                           



                          ? ? ? ? ? ?| likely ? ?                           



                          ? ? | ? ? ? ? ? ? ? | ?                           



                          ? ? ? ? ? ? ? ? ? ? ? ?                           



                          ?                                      



                          +--------------+--------                           



                          --------+---------------                           



                          +-----------------------                           



                          -----+ Percentage of                           



                          drop of Procalcitonin                           



                          calculation for                           



                          Discontinuation of                           



                          antibiotics in                           



                          high-acuity patients                           



                          with suspected or                           



                          confirmed sepsis in                           



                          Adults >= 18 years of                           



                          age.  ? ? ? ? ? ? ? ? ?                           



                          ? ? Procalcitonin                           



                          highest{}-Procalcitonin                           



                          current{}Delta                           



                          Procalcitonin =                           



                          ________________________                           



                          _______________________                           



                          x100%  ? ? ? ? ? ? ? ? ?                           



                          ? ? ? ? ? ? ?                           



                          Procalcitonin current {}                           



                          IMPORTANT NOTE:                           



                          Procalcitonin may be                           



                          elevated without                           



                          bacterial infection by                           



                          physiologic stress                           



                          related to trauma,                           



                          burns, chronic dialysis,                           



                          metastatic cancer,                           



                          surgery in the past                           



                          seven days, malaria,                           



                          some fungal infections,                           



                          and some forms of                           



                          vasculitis. The                           



                          interpretation algorithm                           



                          may not apply to                           



                          patients with                           



                          immunosuppression                           



                          (equivalent of >10 mg of                           



                          prednisone daily), HIV                           



                          with CD4 cell count <                           



                          350 cells/mm3, active                           



                          malignancy on systemic                           



                          chemotherapy, solid                           



                          organ transplant or                           



                          hematopoietic stem cell                           



                          transplantation, or                           



                          hospital acquired                           



                          pneumonia. Additionally,                           



                          some clinical trials of                           



                          procalcitonin have                           



                          excluded patients with                           



                          shock requiring                           



                          vasopressor use, acute                           



                          respiratory failure                           



                          requiring mechanical                           



                          ventilation, or those                           



                          with known lung                           



                          abscess/empyema. For                           



                          further information                           



                          please refer                           



                          to:[email or web address]                           



                          [email or web address]                           

 

             Lab Interpretation Abnormal                               



             (test code = 19844-9)                                        



[id number]26:00





             Test Item    Value        Reference Range Interpretation Comments

 

             VIT D 25OH (test code = 33 ng/mL     25-80                     



             81438-8)                                            

 

             MISA (test code = MISA) Deficiency: <20                           



                          ng/mLInsufficiency                           



                          : 20-24                                



                          ng/mLOptimal:                           



                          25-80 ng/mL                            

 

             Lab Interpretation (test Normal                                 



             code = 46422-1)                                        



[facility]20[facility] 
21:23:00





             Test Item    Value        Reference Range Interpretation Comments

 

             HGB A1C (test code = 5.9 %        4.0-6.0                   



             4548-4)                                             

 

             MISA (test code = MISA) %A1C (NGSP)                            



                          Interpretation                           



                          (ADA)4.8-5.6 ? ?                           



                          Normal or                              



                          (Non-Diabetic                           



                          Range)5.7-6.4 ? ?                           



                          Increased Risk                           



                          (Pre-Diabetic)>6.5 ? ?                           



                          ? ?Diabetes Indicated                           

 

             Lab Interpretation Normal                                 



             (test code = 89735-8)                                        



[facility] [id number] 
20:27:24 No acute pulmonary embolus. Diffuse groundglass/nodular opacities 
predominantly within the periphery ofboth lungs, consistent with known Covid-19 
infection. _______________________________ I, Paul Parker MD., have reviewed 
this study and agree with the abovereport.EXAM: CT CHEST PULMONARY ANGIOGRAM 
CLINICAL INDICATION: PE suspected, high pretest prob COVID pna ?  COMPARISON: 
?None. TECHNIQUE: ?Helical CT was performed and reconstructed at 1.25 mm 
slicethickness from lung bases to apices using 100 mL Omnipaque 
intravenouscontrast, without complication. ? Axial MIPS and coronal/sagittal 
MPRS werereconstructed. ? (DFOV = 30 cm) FINDINGS: PULMONARY ARTERIES: 
Enhancement is excellent. There is no filling defect within the 
pulmonaryarterial system. CHEST: Lower neck/thyroid: Unremarkable. Lungs: Dif
fuse groundglass/nodular opacities predominantly along theperiphery of both 
lungs. Central airway: Unremarkable. Pleura: No pleural effusion, thickening or 
pneumothorax. Thoracic aorta and great vessels: ?Normal in diameter. Heart and 
pericardium: The heart is normal in size. . No pericardialeffusion.Lymph nodes: 
Enlarged left paratracheal node measuring 1.2 cm (4:27). Mediastinum: 
Unremarkable. Bones and soft tissues: No acute osseous abnormality. Soft tissues
areunremarkable. Visualized upper abdomen: Unremarkable. 
_______________________________ [facility], Radiant Results Inft User - 2021  
2:28 PM CSTEXAM: CT CHEST PULMONARY ANGIOGRAMCLINICAL INDICATION: PE suspected, 
high pretest prob COVID pna   COMPARISON:  None.TECHNIQUE:  Helical CT was 
performed and reconstructed at 1.25 mm slicethickness from lung bases to apices 
using 100 mL Omnipaque intravenouscontrast, without complication.   Axial MIPS 
and coronal/sagittal MPRS werereconstructed.   (DFOV = 30 cm)FINDINGS:PULMONARY 
ARTERIES:Enhancement is excellent. There is no filling defect within the 
pulmonaryarterial system.CHEST:Lower neck/thyroid: Unremarkable.Lungs: Diffuse 
groundglass/nodular opacities predominantly along theperiphery of both 
lungs.Central airway: Unremarkable.Pleura: No pleural effusion, thickening or 
pneumothorax.Thoracic aorta and great vessels:  Normal in diameter.Heart and 
pericardium: The heart is normal in size. . No pericardialeffusion.Lymph nodes: 
Enlarged left paratracheal node measuring 1.2 cm (4:27).Mediastinum: 
Unremarkable.Bones and soft tissues: No acute osseous abnormality. Soft tissues 
areunremarkable.Visualized upper abdomen: 
Unremarkable._______________________________IMPRESSIONNo acute pulmonary 
embolus.Diffuse groundglass/nodular opacities predominantly within the periphery
ofboth lungs, consistent with known Covid-19 
infection._______________________________I, Paul Murphy MD., have reviewed 
this study and agree with the abovereport.[facility]
LACTATE [id number] 19:40:00





             Test Item    Value        Reference Range Interpretation Comments

 

             LDH (test code = 0345506224) 706 U/L      300-600      H           

 

 

             Lab Interpretation (test code = Abnormal                           

    



             24003-0)                                            



[facility] HospitalFERRITIN [id number] 18:54:00





             Test Item    Value        Reference Range Interpretation Comments

 

             FERRITIN (test code = 428.0 ng/mL  18.0-464.0                



             [id number])                                         

 

             MISA (test code = MISA) Biotin has been                           



                          reported to cause a                           



                          negative bias,                           



                          interpret results                           



                          relative to                            



                          patient's use of                           



                          biotin.                                

 

             Lab Interpretation (test Normal                                 



             code = 95731-5)                                        



[facility] HospitalD-[id number] 18:31:00





             Test Item    Value        Reference    Interpretation Comments



                                       Range                     

 

             D-DIMER (test code = <0.27        See_Comment                [Autom

ated



             [id number])                                         message] The



                                                                 system which



                                                                 generated this



                                                                 result



                                                                 transmitted



                                                                 reference range

:



                                                                 <0.41 ?g/mL



                                                                 (FEU). The



                                                                 reference range



                                                                 was not used to



                                                                 interpret this



                                                                 result as



                                                                 normal/abnormal

.

 

             MISA (test code = This test may be                           



             MISA)         used in conjunction                           



                          with a clinical                           



                          pretest probability                           



                          (PTP) assessment                           



                          model to exclude                           



                          venous                                 



                          thromboembolism                           



                          (VTE) in patients                           



                          suspected of deep                           



                          venous thrombosis                           



                          (DVT) and pulmonary                           



                          embolism (PE)  A                           



                          D-Dimer value less                           



                          than 0.50 ?g/ml                           



                          (FEU) has a negative                           



                          predicative value of                           



                          96 to 100% (95%                           



                          CI)and 97 to 100%                           



                          (95% CI) as an aid                           



                          in the diagnosis of                           



                          deep vein thrombosis                           



                          (DVT) and pulmonary                           



                          embolism when there                           



                          is low or moderate                           



                          pretest probability                           



                          of PE or DVT.                           



                          D-Dimer values are                           



                          expressed in initial                           



                          fibrinogen                             



                          equivalent units                           



                          (FEU)" The assay                           



                          results should be                           



                          used with other                           



                          information,                           



                          including the                           



                          clinical context, in                           



                          forming a diagnosis.                           



                                                                 

 

             Lab Interpretation Normal                                 



             (test code =                                        



             10964-6)                                            



[facility] HospitalCREATINE [id number] 18:18:00





             Test Item    Value        Reference Range Interpretation Comments

 

             CK (test code = 1958783332) 61 U/L                           

 

             Lab Interpretation (test code = Normal                             

    



             72357-9)                                            



[facility] HospitalCBC with [id number] 17:21:00





             Test Item    Value        Reference Range Interpretation Comments

 

             WBC (test code =              See_Comment  H             [Automated



             6690-2)                                             message] The



                                                                 system which



                                                                 generated this



                                                                 result transmit

rodrick



                                                                 reference range

:



                                                                 4.20 - 10.70



                                                                 10*3/?L. The



                                                                 reference range



                                                                 was not used to



                                                                 interpret this



                                                                 result as



                                                                 normal/abnormal

.

 

             RBC (test code =              See_Comment  H             [Automated



             789-8)                                              message] The



                                                                 system which



                                                                 generated this



                                                                 result transmit

rodrick



                                                                 reference range

:



                                                                 4.26 - 5.52



                                                                 10*6/?L. The



                                                                 reference range



                                                                 was not used to



                                                                 interpret this



                                                                 result as



                                                                 normal/abnormal

.

 

             HGB (test code = 15.9 g/dL    12.2-16.4                 



             718-7)                                              

 

             HCT (test code = 48.1 %       38.4-49.3                 



             4544-3)                                             

 

             MCV (test code = 86.0 fL      81.7-95.6                 



             787-2)                                              

 

             MCH (test code = 28.4 pg      26.1-32.7                 



             785-6)                                              

 

             MCHC (test code = 33.1 g/dL    31.2-35.0                 



             786-4)                                              

 

             RDW-SD (test code = 43.2 fL      38.5-51.6                 



             53999-9)                                            

 

             RDW-CV (test code = 13.7 %       12.1-15.4                 



             788-0)                                              

 

             PLT (test code =              See_Comment                [Automated



             777-3)                                              message] The



                                                                 system which



                                                                 generated this



                                                                 result transmit

rodrick



                                                                 reference range

:



                                                                 150 - 328 10*3/

?L.



                                                                 The reference



                                                                 range was not u

sed



                                                                 to interpret th

is



                                                                 result as



                                                                 normal/abnormal

.

 

             MPV (test code = 9.9 fL       9.8-13.0                  



             69912-9)                                            

 

             NRBC/100 WBC (test              See_Comment                [Automat

ed



             code = 7801068439)                                        message] 

The



                                                                 system which



                                                                 generated this



                                                                 result transmit

rodrick



                                                                 reference range

:



                                                                 0.0 - 10.0 /100



                                                                 WBCs. The



                                                                 reference range



                                                                 was not used to



                                                                 interpret this



                                                                 result as



                                                                 normal/abnormal

.

 

             NRBC x10^3 (test code <0.01        See_Comment                [Auto

mated



             = 8753828262)                                        message] The



                                                                 system which



                                                                 generated this



                                                                 result transmit

rodrick



                                                                 reference range

:



                                                                 10*3/?L. The



                                                                 reference range



                                                                 was not used to



                                                                 interpret this



                                                                 result as



                                                                 normal/abnormal

.

 

             GRAN MAT (NEUT) % 92.7 %                                 



             (test code = 770-8)                                        

 

             IMM GRAN % (test code 1.10 %                                 



             = 0758359092)                                        

 

             LYMPH % (test code = 1.2 %                                  



             736-9)                                              

 

             MONO % (test code = 4.8 %                                  



             5905-5)                                             

 

             EOS % (test code = 0.0 %                                  



             713-8)                                              

 

             BASO % (test code = 0.2 %                                  



             706-2)                                              

 

             GRAN MAT x10^3(ANC) 21.85 10*3/uL 1.99-6.95    H            



             (test code =                                        



             1672974232)                                         

 

             IMM GRAN x10^3 (test 0.27 10*3/uL 0.00-0.06    H            



             code = 5914593776)                                        

 

             LYMPH x10^3 (test code 0.28 10*3/uL 1.09-3.23    L            



             = 731-0)                                            

 

             MONO x10^3 (test code 1.13 10*3/uL 0.36-1.02    H            



             = 742-7)                                            

 

             EOS x10^3 (test code = <0.03        0.06-0.53    L            



             711-2)                                              

 

             BASO x10^3 (test code 0.05 10*3/uL 0.01-0.09                 



             = 704-7)                                            

 

             Lab Interpretation Abnormal                               



             (test code = 97609-6)                                        



[id number]00 Moderate 
multifocal pneumonia findings, apparently Covid 19.  EXAM: XR CHEST 1 VW 
COMPARISON: 2020 HISTORY: pneumonia  FINDINGS: Lungs: Decreased 
inspiratory effort comparison with the previous study.There are moderate hazy 
opacities in the mid to lower lungs, somewhatsimilar to the remote study, 
however increased. Heart/Mediastinum: The cardiomediastinal silhouette is normal
in sizeaccountingfor technique. Bones: No osseous lesions are detected. The soft
tissues appear normal [facility], Radiant Results Inft User - 2021 10:56 AM 
CSTEXAM: XR CHEST 1 VWCOMPARISON: 2020HISTORY: pneumonia FINDINGS:Lungs: 
Decreased inspiratory effort comparison with the previous study.There are 
moderate hazy opacities in the mid to lower lungs, somewhatsimilar to the remote
study, however increased.Heart/Mediastinum: The cardiomediastinal silhouette is 
normal in sizeaccounting for technique.Bones: No osseous lesions are detected. 
The soft tissues appear normalIMPRESSIONModerate multifocal pneumonia findings, 
apparently Covid 19.[facility] 
(ALB, T.PRO, BILI T, BU/BC, ALT, AST, ALK PHOS)2021 16:54:00





             Test Item    Value        Reference Range Interpretation Comments

 

             TOTAL BILI (test code = 5831330713) 1.2 mg/dL    0.1-1.1      H    

        

 

             BILI UNCON (test code = 8298559827) 0.6 mg/dL    0.1-1.1           

        

 

             BILI CONJ (test code = 8224027756) 0.0 mg/dL    0.0-0.3            

       

 

             T PROTEIN (test code = 3328460048) 7.8 g/dL     6.3-8.2            

       

 

             ALBUMIN (test code = 5059087171) 4.4 g/dL     3.5-5.0              

     

 

             ALK PHOS (test code = 4602714438) 132 U/L             H      

      

 

             ALTv (test code = 1742-6) 98 U/L       5-50         H            

 

             AST(SGOT) (test code = 2185438761) 59 U/L       13-40        H     

       

 

             Lab Interpretation (test code = Abnormal                           

    



             38278-6)                                            



[facility]85 16[facility]49[facility]00





             Test Item    Value        Reference Range Interpretation Comments

 

             TROPONIN I (test 0.001 ng/mL  See_Comment                [Automated



             code = 0268779916)                                        message] 

The



                                                                 system which



                                                                 generated this



                                                                 result



                                                                 transmitted



                                                                 reference range

:



                                                                 <=0.034. The



                                                                 reference range



                                                                 was not used to



                                                                 interpret this



                                                                 result as



                                                                 normal/abnormal

.

 

             MISA (test code = Equal or Less than                           



             MSIA)         0.034                                  



                          ng/ml---Normal                           



                          ?Note: Cardiac                           



                          troponin begins to                           



                          rise 3-4 hours                           



                          after the onset of                           



                          ischemia. Repeat                           



                          in 4-6 hours if                           



                          the sample was                           



                          drawn within 3-4                           



                          hours of the onset                           



                          of the symptom and                           



                          found normal.                           



                          Between 0.035 and                           



                          0.120 ng/mL---                           



                          Borderline.                            



                          Questionable                           



                          myocardial injury                           



                          or necrosis ?                           



                          ?Note: Serial                           



                          measurement may be                           



                          necessary to                           



                          confirm or exclude                           



                          the diagnosis of                           



                          myocardial injury                           



                          or necrosis;                           



                          Clinical                               



                          correlation                            



                          (symptoms, EKGs,                           



                          imaging studies,                           



                          and others)                            



                          required; Repeat                           



                          in 4-6 hours if                           



                          clinically                             



                          indicated. ? ? ? ?                           



                          Equal or Higher                           



                          than 0.121                             



                          ng/mL---Abnormal.                           



                          Myocardial Injury                           



                          or Necrosis Likely                           



                          ? ? ? ?  Biotin                           



                          has been reported                           



                          to cause a                             



                          negative bias,                           



                          interpret results                           



                          relative to                            



                          patient's use of                           



                          biotin. ? ? ? ? ?                           



                          ? ? ? ? ? ? ? ? ?                           



                          ? ? ? ? ? ? ? ?  ?                           



                          ? ? ? ? ? ? ? ? ?                           



                          ? ? ? ? ? ? ? ? ?                           



                          ? ? ? ? ? ? ? ? ?                           

 

             Lab Interpretation Normal                                 



             (test code =                                        



             21919-5)                                            



[id number] 16:46:00





             Test Item    Value        Reference Range Interpretation Comments

 

             NT-proBNP (test code 91 pg/mL     See_Comment                [Autom

ated



             = 4404993662)                                        message] The



                                                                 system which



                                                                 generated this



                                                                 result



                                                                 transmitted



                                                                 reference range

:



                                                                 <=125. The



                                                                 reference range



                                                                 was not used to



                                                                 interpret this



                                                                 result as



                                                                 normal/abnormal

.

 

             MISA (test code = MISA) Biotin has been                           



                          reported to                            



                          cause a negative                           



                          bias, interpret                           



                          results relative                           



                          to patient's use                           



                          of biotin.                             

 

             Lab Interpretation Normal                                 



             (test code = 12094-9)                                        



[facility]





             Test Item    Value        Reference Range Interpretation Comments

 

             APTT Patient (test              See_Comment                [Automat

ed



             code = 3173-2)                                        message] The



                                                                 system which



                                                                 generated this



                                                                 result



                                                                 transmitted



                                                                 reference range

:



                                                                 23 - 38 Seconds

.



                                                                 The reference



                                                                 range was not



                                                                 used to interpr

et



                                                                 this result as



                                                                 normal/abnormal

.

 

             MISA (test code = MISA) The [facility] patient                           



                          population mean                           



                          normal value for                           



                          aPTT is 30                             



                          seconds.                               

 

             Lab Interpretation Normal                                 



             (test code = 53698-2)                                        



[facility] / [id number] 16:40:00





             Test Item    Value        Reference Range Interpretation Comments

 

             PROTIME PATIENT (test              See_Comment                [Auto

mated message]



             code = 5964-2)                                        The system wh

ich



                                                                 generated this 

result



                                                                 transmitted ref

erence



                                                                 range: 12.0 - 1

4.7



                                                                 Seconds. The re

ference



                                                                 range was not u

sed to



                                                                 interpret this 

result



                                                                 as normal/abnor

mal.

 

             INR (test code = 6301-6)                                        Nor

mal INR <1.1;



                                                                 Warfarin Therap

eutic



                                                                 range 2.0 to 3.

0 or



                                                                 2.5 to 3.5, dep

ending



                                                                 upon the indica

tions.

 

             Lab Interpretation (test Normal                                 



             code = 84253-7)                                        



[facility] (NA, K, CL, CO2, 
GLUCOSE, BUN, CREATININE, CA)2021 16:37:00





             Test Item    Value        Reference Range Interpretation Comments

 

             NA (test code = 136 mmol/L   135-145                   



             9052569811)                                         

 

             K (test code = 4.0 mmol/L   3.5-5.0                   



             7807684545)                                         

 

             CL (test code = 99 mmol/L                        



             8846574349)                                         

 

             CO2 TOTAL (test code = 25 mmol/L    23-31                     



             8581667357)                                         

 

             AGAP (test code =              2-16                      



             1198117546)                                         

 

             BUN (test code = 22 mg/dL     7-23                      



             6115510842)                                         

 

             GLUCOSE (test code = 155 mg/dL           H            



             9701225097)                                         

 

             CREATININE (test code = 0.82 mg/dL   0.60-1.25                 



             4029314051)                                         

 

             CALCIUM (test code = 9.0 mg/dL    8.6-10.6                  



             8374478938)                                         

 

             eGFR Calculation              mL/min/1.73m2              



             (Non-)                                        



             (test code =                                        



             5747406483)                                         

 

             eGFR Calculation              mL/min/1.73m2              



             (African American)                                        



             (test code =                                        



             9521100148)                                         

 

             MISA (test code = MISA) Association of                           



                          Glomerular Filtration                           



                          Rate (GFR) and Staging                           



                          of Kidney Disease*                           



                          +---------------------                           



                          --+-------------------                           



                          --+-------------------                           



                          ------+| GFR                           



                          (mL/min/1.73 m2) ?|                           



                          With Kidney Damage ?|                           



                          ?Without Kidney                           



                          Damage+---------------                           



                          --------+-------------                           



                          --------+-------------                           



                          ------------+| ?>90 ?                           



                          ? ? ? ? ? ? ? ?|                           



                          ?Stage one ? ? ? ? ?|                           



                          ? Normal ? ? ? ? ? ? ?                           



                          ?+--------------------                           



                          ---+------------------                           



                          ---+------------------                           



                          -------+| ?60-89 ? ? ?                           



                          ? ? ? ? ?| ?Stage two                           



                          ? ? ? ? ?| ? Decreased                           



                          GFR ? ? ? ?                            



                          +---------------------                           



                          --+-------------------                           



                          --+-------------------                           



                          ------+| ?30-59 ? ? ?                           



                          ? ? ? ? ?| ?Stage                           



                          three ? ? ? ?| ? Stage                           



                          three ? ? ? ? ?                           



                          +---------------------                           



                          --+-------------------                           



                          --+-------------------                           



                          ------+| ?15-29 ? ? ?                           



                          ? ? ? ? ?| ?Stage four                           



                          ? ? ? ? | ? Stage four                           



                          ? ? ? ? ?                              



                          ?+--------------------                           



                          ---+------------------                           



                          ---+------------------                           



                          -------+| ?<15 (or                           



                          dialysis) ? ?| ?Stage                           



                          five ? ? ? ? | ? Stage                           



                          five ? ? ? ? ?                           



                          ?+--------------------                           



                          ---+------------------                           



                          ---+------------------                           



                          -------+ *Each stage                           



                          assumes the associated                           



                          GFR level has been in                           



                          effect for at least                           



                          three months. ?Stages                           



                          1 to 5, with or                           



                          without kidney                           



                          disease, indicate                           



                          chronic kidney                           



                          disease. Notes:                           



                          Determination of                           



                          stages one and two                           



                          (with eGFR                             



                          >59mL/min/1.73 m2)                           



                          requires estimation of                           



                          kidney damage for at                           



                          least three months as                           



                          defined by structural                           



                          or functional                           



                          abnormalities of the                           



                          kidney, manifested by                           



                          either:Pathological                           



                          abnormalities or                           



                          Markers of kidney                           



                          damage (including                           



                          abnormalities in the                           



                          composition of the                           



                          blood or urine or                           



                          abnormalities in                           



                          imaging tests).                           

 

             Lab Interpretation Abnormal                               



             (test code = 89062-9)                                        



[facility] 
16:37:00





             Test Item    Value        Reference Range Interpretation Comments

 

             SARS-CoV-2 Rapid ID NOW Positive     Not Detected A            



             (test code = 05777-1)                                        

 

             MISA (test code = MISA) ID NOW COVID-19 Assay                        

   



                          is an isothermal                           



                          nucleic acid                           



                          amplification test                           



                          intended for the                           



                          qualitative detection                           



                          of nucleic acid from                           



                          SARS-CoV-2 viral RNA                           



                          in nasopharyngeal (NP)                           



                          specimens. It is used                           



                          under Emergency Use                           



                          Authorization (EUA) by                           



                          FDA. The limit of                           



                          detection (LOD) of the                           



                          assay is 125 Genome                           



                          Equivalents/mL. A                           



                          positive result is                           



                          indicative of the                           



                          presence of SARS-CoV-2                           



                          RNA. ?Clinical                           



                          correlation with                           



                          patient history and                           



                          other diagnostic                           



                          information is                           



                          necessary to determine                           



                          patient infection                           



                          status. A negative                           



                          (Not Detected) result                           



                          does not preclude                           



                          SARS-CoV-2 infection.                           



                          In patients with                           



                          clinical symptoms and                           



                          other tests that are                           



                          consistent with                           



                          SARS-CoV-2 infection,                           



                          negative results                           



                          should be treated as                           



                          presumptive negative                           



                          and a new specimen                           



                          should be tested with                           



                          alternative PCR                           



                          molecular test.                           



                          Invalid: Please                           



                          collect a new specimen                           



                          for repeat patient                           



                          testing if clinically                           



                          indicated.                             

 

             Lab Interpretation Abnormal                               



             (test code = 67939-9)                                        



[facility]

## 2022-04-18 NOTE — EDPHYS
Physician Documentation                                                                           

 [facility]                                                                 

Name: Ralph Yañez                                                                             

Age: 41 yrs                                                                                       

Sex: Male                                                                                         

: 1980                                                                                   

MRN: [id number]                                                                                   

Arrival Date: 2022                                                                          

Time: 02:32                                                                                       

Account#: [id number]                                                                            

Bed 7                                                                                             

Private MD:                                                                                       

ED Physician Lio Trotter                                                                         

HPI:                                                                                              

                                                                                             

03:11 This 41 yrs old Male presents to ER via Ambulatory with complaints of Asthma            rn  

      Exacerbation.                                                                               

03:11 The patient presents to the emergency department with wheezing, the patient was         rn  

      reported to have audible wheezing, non-productive cough, trouble breathing. Onset: The      

      symptoms/episode began/occurred 1 week(s) ago. Modifying factors: The symptoms are          

      alleviated by nothing, the symptoms are aggravated by exertion. Associated signs and        

      symptoms: Pertinent negatives: chest pain, fever. Severity of symptoms: At their worst      

      the symptoms were moderate in the emergency department the symptoms are unchanged. The      

      patient has not experienced similar symptoms in the past. The patient has not recently      

      seen a physician. Pt reports wheezing and sob, non-productive cough, for 1 week. No         

      fever. Does not feel ill/sick. No sick contacts. .                                          

                                                                                                  

Historical:                                                                                       

- Allergies:                                                                                      

02:41 Dilantin;                                                                               tw5 

- Home Meds:                                                                                      

02:41 albuterol sulfate 0.63 mg/3 mL Inhl nebu [Active]; Flonase 50 mcg/actuation Nasal spsn  tw5 

      1 spray 2 times per day [Active]; Zyrtec 10 mg Oral chew 1 tab once daily [Active];         

- PMHx:                                                                                           

02:41 Asthma; hyperthyroidism; Pneumonia; seasonal allergies;                                 tw5 

- PSHx:                                                                                           

02:41 Thyroidectomy;                                                                          tw5 

                                                                                                  

- Immunization history:: Flu vaccine is not up to date.                                           

- Social history:: Smoking status: Patient denies any tobacco usage or history of.                

- Family history:: not pertinent.                                                                 

- Hospitalizations: : No recent hospitalization is reported.                                      

                                                                                                  

                                                                                                  

ROS:                                                                                              

03:11 Constitutional: Negative for fever, chills, and weight loss, Eyes: Negative for injury, rn  

      pain, redness, and discharge, ENT: Negative for injury, pain, and discharge, Neck:          

      Negative for injury, pain, and swelling, Cardiovascular: Negative for chest pain,           

      palpitations, and edema, Respiratory: + sob and cough, + wheezing Abdomen/GI: Negative      

      for abdominal pain, nausea, vomiting, diarrhea, and constipation, Back: Negative for        

      injury and pain, : Negative for injury, bleeding, discharge, and swelling,                

      MS/Extremity: Negative for injury and deformity, Skin: Negative for injury, rash, and       

      discoloration, Neuro: Negative for headache, weakness, numbness, tingling, and seizure.     

                                                                                                  

Exam:                                                                                             

03:11 Constitutional:  This is a well developed, well nourished patient who is awake, alert,  rn  

      ambulatory to room without assistance.  Head/Face:  Normocephalic, atraumatic. Eyes:        

      Periorbital areas with no swelling, redness, or edema. ENT:  no stridor Cardiovascular:     

       Regular rate and rhythm.  No pulse deficits. Respiratory:  + mild tachypnea, no            

      retractions, + bilateral wheezing noted. Abdomen/GI:  Soft, non-tender Skin:  Warm, dry     

      MS/ Extremity:  Pulses equal, no cyanosis. Neuro:  Awake and alert, GCS 15                  

                                                                                                  

Vital Signs:                                                                                      

02:40  / 93; Pulse 74; Resp 22; Temp 97.5; Pulse Ox 96% on R/A; Weight 88.45 kg; Height tw5 

      5 ft. 9 in. (175.26 cm); Pain 0/10;                                                         

03:15  / 60; Pulse 68; Resp 17; Pulse Ox 100% ;                                         ll3 

04:40  / 89; Pulse 75; Resp 18; Pulse Ox 98% on R/A;                                    ll3 

05:21  / 96; Pulse 80; Resp 20; Pulse Ox 97% on R/A;                                    lp1 

02:40 Body Mass Index 28.80 (88.45 kg, 175.26 cm)                                             tw5 

                                                                                                  

MDM:                                                                                              

02:33 Patient medically screened.                                                             rn  

04:58 Differential diagnosis: acute asthma, reactive airway, URI. Antibiotic administration:  rn  

      Not indicated. Data reviewed: vital signs, nurses notes, old medical records,               

      radiologic studies, plain films, and as a result, I will discharge patient. Data            

      interpreted: Pulse oximetry: on room air is 98 %. Interpretation: normal. Counseling: I     

      had a detailed discussion with the patient and/or guardian regarding: the historical        

      points, exam findings, and any diagnostic results supporting the discharge/admit            

      diagnosis, radiology results, the need for outpatient follow up, to return to the           

      emergency department if symptoms worsen or persist or if there are any questions or         

      concerns that arise at home. Response to treatment: the patient's symptoms have             

      markedly improved after treatment, and as a result, I will discharge patient. Special       

      discussion: I discussed with the patient/guardian in detail that at this point there is     

      no indication for admission to the hospital. It is understood, however, that if the         

      symptoms persist or worsen the patient needs to return immediately for re-evaluation.       

                                                                                                  

                                                                                             

02:52 Order name: XRAY Chest (1 view)                                                         rn  

                                                                                             

02:52 Order name: IV Start; Complete Time: 03:15                                              rn  

                                                                                                  

Administered Medications:                                                                         

03:02 Drug: SOLU-Medrol (methylPrednisoLONE) 125 mg Route: IVP; Site: right antecubital;      ll3 

03:08 Drug: Magnesium Sulfate 1 grams Route: IVPB; Infused Over: 1 hrs; Site: right           ll3 

      antecubital;                                                                                

03:16 Drug: Xopenex (levalbuterol) (3) 1.25 mg Route: Inhalation;                             ll3 

03:16 Drug: AtroVENT (ipratropium) Aerosol 0.5 mg Route: Inhalation;                          ll3 

                                                                                                  

                                                                                                  

Disposition Summary:                                                                              

22 04:58                                                                                    

Discharge Ordered                                                                                 

      Location: Home                                                                          rn  

      Problem: an acute exacerbation                                                          rn  

      Symptoms: have improved                                                                 rn  

      Condition: Stable                                                                       rn  

      Diagnosis                                                                                   

        - Unspecified asthma with (acute) exacerbation                                        rn  

      Followup:                                                                               rn  

        - With: Private Physician                                                                  

        - When: As needed                                                                          

        - Reason: Recheck today's complaints, Re-evaluation by your physician                      

      Discharge Instructions:                                                                     

        - Discharge Summary Sheet                                                             rn  

        - Asthma, Adult                                                                       rn  

      Forms:                                                                                      

        - Medication Reconciliation Form                                                      rn  

        - Thank You Letter                                                                    rn  

        - Antibiotic Education                                                                rn  

        - Prescription Opioid Use                                                             rn  

        - School release form                                                                 ph  

      Prescriptions:                                                                              

        - Prednisone 20 mg Oral Tablet                                                             

            - take 3 tablets by ORAL route once daily for 5 days; 15 tablet; Refills: 0,      rn  

      Product Selection Permitted                                                                 

Signatures:                                                                                       

Dispatcher [employer]                           EDLio Lutz MD MD rn Wood, Tiffany                                tw5                                                  

Burt Moya RN                      RN   ll3                                                  

                                                                                                  

Corrections: (The following items were deleted from the chart)                                    

03:13 03:11 Constitutional: Negative for fever, chills, and weight loss, Eyes: Negative for   rn  

      injury, pain, redness, and discharge, Neck: Negative for injury, pain, and swelling,        

      Cardiovascular: Negative for chest pain, palpitations, and edema, Respiratory: + sob        

      and cough, + wheezing Abdomen/GI: Negative for abdominal pain, nausea, vomiting,            

      diarrhea, and constipation, Back: Negative for injury and pain, : Negative for            

      injury, bleeding, discharge, and swelling, MS/Extremity: Negative for injury and            

      deformity, Skin: Negative for injury, rash, and discoloration, Neuro: Negative for          

      headache, weakness, numbness, tingling, and seizure, rn                                     

                                                                                                  

**************************************************************************************************

## 2022-04-18 NOTE — ER
Nurse's Notes                                                                                     

 [facility]                                                                 

Name: Ralph Yañez                                                                             

Age: 41 yrs                                                                                       

Sex: Male                                                                                         

: 1980                                                                                   

MRN: [id number]                                                                                   

Arrival Date: 2022                                                                          

Time: 02:32                                                                                       

Account#: [id number]                                                                            

Bed 7                                                                                             

Private MD:                                                                                       

Diagnosis: Unspecified asthma with (acute) exacerbation                                           

                                                                                                  

Presentation:                                                                                     

                                                                                             

02:40 Chief complaint: Patient states: "I took some treatments, but I am still feeling out of tw5 

      breath.". Coronavirus screen: Vaccine status: Patient reports being unvaccinated. Ebola     

      Screen: Patient negative for fever greater than or equal to 101.5 degrees Fahrenheit,       

      and additional compatible Ebola Virus Disease symptoms Patient denies exposure to           

      infectious person. Patient denies travel to an Ebola-affected area in the 21 days           

      before illness onset. Initial Sepsis Screen: Does the patient meet any 2 criteria? No.      

      Patient's initial sepsis screen is negative. Does the patient have a suspected source       

      of infection? No. Patient's initial sepsis screen is negative. Risk Assessment: Do you      

      want to hurt yourself or someone else? Patient reports no desire to harm self or            

      others. Onset of symptoms was 2022 at 11:00.                                      

02:40 Method Of Arrival: Ambulatory                                                           tw5 

02:40 Acuity: JACEY 3                                                                           tw5 

                                                                                                  

Triage Assessment:                                                                                

02:41 General: Appears in no apparent distress. Behavior is calm, cooperative, appropriate    tw5 

      for age. Pain: Denies pain.                                                                 

                                                                                                  

Historical:                                                                                       

- Allergies:                                                                                      

02:41 Dilantin;                                                                               tw5 

- Home Meds:                                                                                      

02:41 albuterol sulfate 0.63 mg/3 mL Inhl nebu [Active]; Flonase 50 mcg/actuation Nasal spsn  tw5 

      1 spray 2 times per day [Active]; Zyrtec 10 mg Oral chew 1 tab once daily [Active];         

- PMHx:                                                                                           

02:41 Asthma; hyperthyroidism; Pneumonia; seasonal allergies;                                 tw5 

- PSHx:                                                                                           

02:41 Thyroidectomy;                                                                          tw5 

                                                                                                  

- Immunization history:: Flu vaccine is not up to date.                                           

- Social history:: Smoking status: Patient denies any tobacco usage or history of.                

- Family history:: not pertinent.                                                                 

- Hospitalizations: : No recent hospitalization is reported.                                      

                                                                                                  

                                                                                                  

Screenin:42 Abuse screen: Denies threats or abuse. Denies injuries from another. Nutritional        tw5 

      screening: No deficits noted. Tuberculosis screening: No symptoms or risk factors           

      identified. Fall Risk None identified.                                                      

                                                                                                  

Assessment:                                                                                       

02:59 General: Appears uncomfortable, Behavior is calm, cooperative. Pain: Denies pain.       ll3 

      Neuro: Level of Consciousness is awake, alert, obeys commands, Oriented to person,          

      place, time, situation. Respiratory: Reports cough that is non-productive, since One        

      week Respiratory effort is labored, Respiratory pattern is regular, symmetrical, Breath     

      sounds with wheezes bilaterally. Derm: Skin is pink, warm \T\ dry.                          

04:00 Reassessment: Patient and/or family updated on plan of care and expected duration. Pain ll3 

      level reassessed. Patient is alert, oriented x 3, equal unlabored respirations, skin        

      warm/dry/pink. Patient states symptoms have improved.                                       

05:21 Reassessment: Patient appears in no apparent distress at this time. Patient is alert,   lp1 

      oriented x 3, equal unlabored respirations, skin warm/dry/pink. Patient states feeling      

      better. Patient states symptoms have improved. Respiratory: Respiratory effort is even.     

                                                                                                  

Vital Signs:                                                                                      

02:40  / 93; Pulse 74; Resp 22; Temp 97.5; Pulse Ox 96% on R/A; Weight 88.45 kg; Height tw5 

      5 ft. 9 in. (175.26 cm); Pain 0/10;                                                         

03:15  / 60; Pulse 68; Resp 17; Pulse Ox 100% ;                                         ll3 

04:40  / 89; Pulse 75; Resp 18; Pulse Ox 98% on R/A;                                    ll3 

05:21  / 96; Pulse 80; Resp 20; Pulse Ox 97% on R/A;                                    lp1 

02:40 Body Mass Index 28.80 (88.45 kg, 175.26 cm)                                             tw5 

                                                                                                  

ED Course:                                                                                        

02:32 Patient arrived in ED.                                                                  bp1 

02:33 Lio Trotter MD is Attending Physician.                                                rn  

02:41 Triage completed.                                                                       tw5 

02:41 Arm band placed on right wrist.                                                         tw5 

02:52 Burt Moya, RN is Primary Nurse.                                                    ll3 

02:59 Patient has correct armband on for positive identification. Bed in low position. Call   ll3 

      light in reach. Side rails up X 1.                                                          

02:59 Inserted saline lock: 22 gauge in right antecubital area, using aseptic technique.      ll3 

03:06 XRAY Chest (1 view) In Process Unspecified.                                             EDMS

05:22 No provider procedures requiring assistance completed. IV discontinued, No              lp1 

      redness/swelling at site. Pressure dressing applied.                                        

                                                                                                  

Administered Medications:                                                                         

03:02 Drug: SOLU-Medrol (methylPrednisoLONE) 125 mg Route: IVP; Site: right antecubital;      ll3 

03:08 Drug: Magnesium Sulfate 1 grams Route: IVPB; Infused Over: 1 hrs; Site: right           ll3 

      antecubital;                                                                                

03:16 Drug: Xopenex (levalbuterol) (3) 1.25 mg Route: Inhalation;                             ll3 

03:16 Drug: AtroVENT (ipratropium) Aerosol 0.5 mg Route: Inhalation;                          ll3 

                                                                                                  

                                                                                                  

Outcome:                                                                                          

04:58 Discharge ordered by MD.                                                                rn  

05:22 Discharged to home ambulatory.                                                          lp1 

05:22 Condition: good                                                                             

05:22 Discharge instructions given to patient, Instructed on discharge instructions, follow       

      up and referral plans. medication usage, Demonstrated understanding of instructions,        

      follow-up care, medications, Prescriptions given X 1.                                       

05:23 Patient left the ED.                                                                    lp1 

                                                                                                  

Signatures:                                                                                       

Dispatcher [employer]                           EDMS                                                 

Lio Trotter MD MD rn Pena, Laura, RN                         RN   lp1                                                  

Naye Mcgill Tiffany                                tw5                                                  

Burt Moya RN                      RN   ll3                                                  

                                                                                                  

**************************************************************************************************

## 2022-04-18 NOTE — RAD REPORT
EXAM DESCRIPTION:  RAD - Chest Single View - 4/18/2022 3:04 am

 

CLINICAL HISTORY:  41 years, Male, DYSPNEA

 

COMPARISON:  None

 

FINDINGS:  Single view of the chest was obtained portable. No prior films are available for compariso
n.   The cardiomediastinal silhouette demonstrate to be unremarkable. The heart is not enlarged. The 
thoracic aorta is unremarkable. The pulmonary vasculature is normal distribution. Minimal linear dens
ities lung bases correspond most likely to atelectasis. No significant pleural effusions. No focal ar
eas of consolidation.   The rest of the soft tissue and bony structures demonstrate to be unremarkabl
e.

 

IMPRESSION:  Minimal linear densities lung bases correspond most likely to atelectasis.

 

Electronically signed by:   Carlos Hill MD   4/18/2022 3:23 AM CDT Workstation: [id number]

 

 

 

Due to temporary technical issues with the PACS/Fluency reporting system, reports are being signed by
 the in house radiologists without review as a courtesy to insure prompt reporting. The interpreting 
radiologist is fully responsible for the content of the report.